# Patient Record
Sex: FEMALE | Race: WHITE | NOT HISPANIC OR LATINO | Employment: FULL TIME | URBAN - METROPOLITAN AREA
[De-identification: names, ages, dates, MRNs, and addresses within clinical notes are randomized per-mention and may not be internally consistent; named-entity substitution may affect disease eponyms.]

---

## 2018-11-13 ENCOUNTER — CLINICAL SUPPORT (OUTPATIENT)
Dept: ONCOLOGY | Facility: HOSPITAL | Age: 63
End: 2018-11-13

## 2018-11-13 ENCOUNTER — HOSPITAL ENCOUNTER (OUTPATIENT)
Dept: ONCOLOGY | Facility: CLINIC | Age: 63
Setting detail: INFUSION SERIES
Discharge: HOME | End: 2018-11-13
Attending: INTERNAL MEDICINE | Admitting: INTERNAL MEDICINE

## 2018-11-13 ENCOUNTER — HOSPITAL ENCOUNTER (OUTPATIENT)
Dept: ONCOLOGY | Facility: HOSPITAL | Age: 63
Discharge: HOME | End: 2018-11-13
Attending: INTERNAL MEDICINE | Admitting: INTERNAL MEDICINE

## 2018-11-13 LAB
ALBUMIN SERPL-MCNC: 3.6 G/DL (ref 3.5–4.8)
ALBUMIN/GLOB SERPL: 0.9 {RATIO} (ref 1–1.7)
ALP SERPL-CCNC: 98 IU/L (ref 32–91)
ALT SERPL-CCNC: 20 IU/L (ref 14–54)
ANION GAP SERPL CALC-SCNC: 13.3 MMOL/L (ref 10–20)
AST SERPL-CCNC: 24 IU/L (ref 15–41)
BILIRUB SERPL-MCNC: 0.9 MG/DL (ref 0.3–1.2)
BUN SERPL-MCNC: 4 MG/DL (ref 8–20)
BUN/CREAT SERPL: 5.7 (ref 5.4–26.2)
CALCIUM SERPL-MCNC: 9.1 MG/DL (ref 8.9–10.3)
CHLORIDE SERPL-SCNC: 94 MMOL/L (ref 101–111)
CONV CO2: 27 MMOL/L (ref 22–32)
CONV TOTAL PROTEIN: 7.7 G/DL (ref 6.1–7.9)
CREAT UR-MCNC: 0.7 MG/DL (ref 0.4–1)
ERYTHROCYTE [SEDIMENTATION RATE] IN BLOOD BY WESTERGREN METHOD: 61 MM/HR (ref 0–30)
GLOBULIN UR ELPH-MCNC: 4.1 G/DL (ref 2.5–3.8)
GLUCOSE SERPL-MCNC: 91 MG/DL (ref 65–99)
LDH SERPL-CCNC: 115 IU/L (ref 98–192)
POTASSIUM SERPL-SCNC: 3.3 MMOL/L (ref 3.6–5.1)
SODIUM SERPL-SCNC: 131 MMOL/L (ref 136–144)
URATE SERPL-MCNC: 5 MG/DL (ref 2.6–8)

## 2018-11-13 NOTE — PROGRESS NOTES
PATIENTS ONCOLOGY RECORD LOCATED IN [facility]      Subjective     Name:  SHERLEY WILKINS     Date:  2018  Address:  [facility] [address] [place] [address]  Home: [unfilled]  :  1955 AGE:  63 y.o.        RECORDS OBTAINED:  Patients Oncology Record is located in [facility]

## 2018-11-16 ENCOUNTER — HOSPITAL ENCOUNTER (OUTPATIENT)
Dept: ONCOLOGY | Facility: CLINIC | Age: 63
Setting detail: INFUSION SERIES
Discharge: HOME | End: 2018-11-16
Attending: INTERNAL MEDICINE | Admitting: INTERNAL MEDICINE

## 2018-11-16 ENCOUNTER — HOSPITAL ENCOUNTER (OUTPATIENT)
Dept: CARDIOLOGY | Facility: HOSPITAL | Age: 63
Discharge: HOME | End: 2018-11-16
Attending: INTERNAL MEDICINE | Admitting: INTERNAL MEDICINE

## 2018-11-16 ENCOUNTER — CLINICAL SUPPORT (OUTPATIENT)
Dept: ONCOLOGY | Facility: HOSPITAL | Age: 63
End: 2018-11-16

## 2018-11-16 NOTE — PROGRESS NOTES
PATIENTS ONCOLOGY RECORD LOCATED IN [facility]      Subjective     Name:  SHERLEY WILKINS     Date:  2018  Address:  [facility] [address] [facility] [facility] [place] [address]  Home: [unfilled]  :  1955 AGE:  63 y.o.        RECORDS OBTAINED:  Patients Oncology Record is located in [facility]

## 2018-11-19 ENCOUNTER — HOSPITAL ENCOUNTER (OUTPATIENT)
Dept: PREADMISSION TESTING | Facility: HOSPITAL | Age: 63
Discharge: HOME | End: 2018-11-19
Attending: SURGERY | Admitting: SURGERY

## 2018-11-19 LAB
ANION GAP SERPL CALC-SCNC: 10.9 MMOL/L (ref 10–20)
BACTERIA SPEC AEROBE CULT: NORMAL
BASOPHILS # BLD AUTO: 0.1 10*3/UL (ref 0–0.2)
BASOPHILS NFR BLD AUTO: 1 % (ref 0–2)
BUN SERPL-MCNC: 5 MG/DL (ref 8–20)
BUN/CREAT SERPL: 7.1 (ref 5.4–26.2)
CALCIUM SERPL-MCNC: 9.3 MG/DL (ref 8.9–10.3)
CHLORIDE SERPL-SCNC: 97 MMOL/L (ref 101–111)
CONV CO2: 28 MMOL/L (ref 22–32)
CREAT UR-MCNC: 0.7 MG/DL (ref 0.4–1)
DIFFERENTIAL METHOD BLD: (no result)
EOSINOPHIL # BLD AUTO: 0.3 10*3/UL (ref 0–0.3)
EOSINOPHIL # BLD AUTO: 3 % (ref 0–3)
ERYTHROCYTE [DISTWIDTH] IN BLOOD BY AUTOMATED COUNT: 14 % (ref 11.5–14.5)
GLUCOSE SERPL-MCNC: 87 MG/DL (ref 65–99)
HCT VFR BLD AUTO: 42.4 % (ref 35–49)
HGB BLD-MCNC: 14.2 G/DL (ref 12–15)
LYMPHOCYTES # BLD AUTO: 2.7 10*3/UL (ref 0.8–4.8)
LYMPHOCYTES NFR BLD AUTO: 25 % (ref 18–42)
Lab: NORMAL
MCH RBC QN AUTO: 28.4 PG (ref 26–32)
MCHC RBC AUTO-ENTMCNC: 33.4 G/DL (ref 32–36)
MCV RBC AUTO: 85 FL (ref 80–94)
MICRO REPORT STATUS: NORMAL
MONOCYTES # BLD AUTO: 0.7 10*3/UL (ref 0.1–1.3)
MONOCYTES NFR BLD AUTO: 6 % (ref 2–11)
NEUTROPHILS # BLD AUTO: 7.2 10*3/UL (ref 2.3–8.6)
NEUTROPHILS NFR BLD AUTO: 65 % (ref 50–75)
NRBC BLD AUTO-RTO: 0 /100{WBCS}
NRBC/RBC NFR BLD MANUAL: 0 10*3/UL
PLATELET # BLD AUTO: 419 10*3/UL (ref 150–450)
PMV BLD AUTO: 7.9 FL (ref 7.4–10.4)
POTASSIUM SERPL-SCNC: 3.9 MMOL/L (ref 3.6–5.1)
RBC # BLD AUTO: 5 10*6/UL (ref 4–5.4)
SODIUM SERPL-SCNC: 132 MMOL/L (ref 136–144)
SPECIMEN SOURCE: NORMAL
WBC # BLD AUTO: 10.9 10*3/UL (ref 4.5–11.5)

## 2018-11-20 ENCOUNTER — HOSPITAL ENCOUNTER (OUTPATIENT)
Dept: ONCOLOGY | Facility: HOSPITAL | Age: 63
Discharge: HOME | End: 2018-11-20
Attending: INTERNAL MEDICINE | Admitting: INTERNAL MEDICINE

## 2018-11-20 LAB — GLUCOSE BLD-MCNC: 164 MG/DL (ref 70–105)

## 2018-11-30 ENCOUNTER — CLINICAL SUPPORT (OUTPATIENT)
Dept: ONCOLOGY | Facility: HOSPITAL | Age: 63
End: 2018-11-30

## 2018-11-30 ENCOUNTER — HOSPITAL ENCOUNTER (OUTPATIENT)
Dept: ONCOLOGY | Facility: CLINIC | Age: 63
Setting detail: INFUSION SERIES
Discharge: HOME | End: 2018-11-30
Attending: INTERNAL MEDICINE | Admitting: INTERNAL MEDICINE

## 2018-11-30 ENCOUNTER — HOSPITAL ENCOUNTER (OUTPATIENT)
Dept: ONCOLOGY | Facility: HOSPITAL | Age: 63
Discharge: HOME | End: 2018-11-30
Attending: INTERNAL MEDICINE | Admitting: INTERNAL MEDICINE

## 2018-11-30 LAB
ALBUMIN SERPL-MCNC: 2.9 G/DL (ref 3.5–4.8)
ALPHA1 GLOB FLD ELPH-MCNC: 0.3 GM/DL (ref 0.1–0.4)
ALPHA2 GLOB SERPL ELPH-MCNC: 0.9 GM/DL (ref 0.5–1)
B-GLOBULIN SERPL ELPH-MCNC: 1.2 GM/DL (ref 0.7–1.4)
CONV TOTAL PROTEIN: 6.3 G/DL (ref 6.1–7.9)
GAMMA GLOB SERPL ELPH-MCNC: 1 GM/DL (ref 0.6–1.6)
INSULIN SERPL-ACNC: ABNORMAL U[IU]/ML

## 2018-11-30 NOTE — PROGRESS NOTES
PATIENTS ONCOLOGY RECORD LOCATED IN [facility]      Subjective     Name:  SHERLEY WILKINS     Date:  2018  Address:  [facility] [address] [place] [address]  Home: [unfilled]  :  1955 AGE:  63 y.o.        RECORDS OBTAINED:  Patients Oncology Record is located in [facility]

## 2018-12-19 ENCOUNTER — HOSPITAL ENCOUNTER (OUTPATIENT)
Dept: INTERVENTIONAL RADIOLOGY/VASCULAR | Facility: HOSPITAL | Age: 63
Discharge: HOME | End: 2018-12-19
Attending: OTOLARYNGOLOGY | Admitting: OTOLARYNGOLOGY

## 2019-01-03 ENCOUNTER — CLINICAL SUPPORT (OUTPATIENT)
Dept: ONCOLOGY | Facility: HOSPITAL | Age: 64
End: 2019-01-03

## 2019-01-03 ENCOUNTER — HOSPITAL ENCOUNTER (OUTPATIENT)
Dept: ONCOLOGY | Facility: CLINIC | Age: 64
Setting detail: INFUSION SERIES
Discharge: HOME | End: 2019-01-03
Attending: INTERNAL MEDICINE | Admitting: INTERNAL MEDICINE

## 2019-01-03 NOTE — PROGRESS NOTES
PATIENTS ONCOLOGY RECORD LOCATED IN [facility]      Subjective     Name:  SHERLEY WILKINS     Date:  2019  Address:  [facility] [address] [facility] [place] [address]  Home: [unfilled]  :  1955 AGE:  63 y.o.        RECORDS OBTAINED:  Patients Oncology Record is located in [facility]

## 2019-01-10 ENCOUNTER — HOSPITAL ENCOUNTER (OUTPATIENT)
Dept: ONCOLOGY | Facility: HOSPITAL | Age: 64
Discharge: HOME | End: 2019-01-10
Attending: INTERNAL MEDICINE | Admitting: INTERNAL MEDICINE

## 2019-01-10 ENCOUNTER — HOSPITAL ENCOUNTER (OUTPATIENT)
Dept: ONCOLOGY | Facility: CLINIC | Age: 64
Setting detail: INFUSION SERIES
Discharge: HOME | End: 2019-01-10
Attending: INTERNAL MEDICINE | Admitting: INTERNAL MEDICINE

## 2019-01-10 ENCOUNTER — CLINICAL SUPPORT (OUTPATIENT)
Dept: ONCOLOGY | Facility: HOSPITAL | Age: 64
End: 2019-01-10

## 2019-01-10 LAB
ALBUMIN SERPL-MCNC: 3.5 G/DL (ref 3.5–4.8)
ALBUMIN/GLOB SERPL: 0.9 {RATIO} (ref 1–1.7)
ALP SERPL-CCNC: 85 IU/L (ref 32–91)
ALT SERPL-CCNC: 17 IU/L (ref 14–54)
ANION GAP SERPL CALC-SCNC: 14.7 MMOL/L (ref 10–20)
AST SERPL-CCNC: 23 IU/L (ref 15–41)
BILIRUB SERPL-MCNC: 0.3 MG/DL (ref 0.3–1.2)
BUN SERPL-MCNC: 10 MG/DL (ref 8–20)
BUN/CREAT SERPL: 14.3 (ref 5.4–26.2)
CALCIUM SERPL-MCNC: 9.2 MG/DL (ref 8.9–10.3)
CHLORIDE SERPL-SCNC: 103 MMOL/L (ref 101–111)
CONV CO2: 22 MMOL/L (ref 22–32)
CONV TOTAL PROTEIN: 7.2 G/DL (ref 6.1–7.9)
CREAT UR-MCNC: 0.7 MG/DL (ref 0.4–1)
GLOBULIN UR ELPH-MCNC: 3.7 G/DL (ref 2.5–3.8)
GLUCOSE SERPL-MCNC: 100 MG/DL (ref 65–99)
POTASSIUM SERPL-SCNC: 3.7 MMOL/L (ref 3.6–5.1)
SODIUM SERPL-SCNC: 136 MMOL/L (ref 136–144)

## 2019-01-11 NOTE — PROGRESS NOTES
PATIENTS ONCOLOGY RECORD LOCATED IN [facility]      Subjective     Name:  SHERLEY WILKINS     Date:  01/10/2019  Address:  [facility] [address] [facility] [address] [facility] [place] [address]  Home: [unfilled]  :  1955 AGE:  63 y.o.        RECORDS OBTAINED:  Patients Oncology Record is located in [facility]

## 2019-01-11 NOTE — PROGRESS NOTES
PATIENTS ONCOLOGY RECORD LOCATED IN [facility]      Subjective     Name:  SHERLEY WILKINS     Date:  01/10/2019  Address:  [facility] [address] [place] [address]  Home: [unfilled]  :  1955 AGE:  63 y.o.        RECORDS OBTAINED:  Patients Oncology Record is located in [facility]

## 2019-01-22 ENCOUNTER — HOSPITAL ENCOUNTER (OUTPATIENT)
Dept: ONCOLOGY | Facility: HOSPITAL | Age: 64
Discharge: HOME | End: 2019-01-22
Attending: INTERNAL MEDICINE | Admitting: INTERNAL MEDICINE

## 2019-01-22 ENCOUNTER — CLINICAL SUPPORT (OUTPATIENT)
Dept: ONCOLOGY | Facility: HOSPITAL | Age: 64
End: 2019-01-22

## 2019-01-22 ENCOUNTER — HOSPITAL ENCOUNTER (OUTPATIENT)
Dept: ONCOLOGY | Facility: CLINIC | Age: 64
Setting detail: INFUSION SERIES
Discharge: HOME | End: 2019-01-22
Attending: INTERNAL MEDICINE | Admitting: INTERNAL MEDICINE

## 2019-01-22 LAB
ALBUMIN SERPL-MCNC: 3.6 G/DL (ref 3.5–4.8)
ALBUMIN/GLOB SERPL: 1 {RATIO} (ref 1–1.7)
ALP SERPL-CCNC: 118 IU/L (ref 32–91)
ALT SERPL-CCNC: 24 IU/L (ref 14–54)
ANION GAP SERPL CALC-SCNC: 11.8 MMOL/L (ref 10–20)
AST SERPL-CCNC: 23 IU/L (ref 15–41)
BILIRUB SERPL-MCNC: <0.1 MG/DL (ref 0.3–1.2)
BUN SERPL-MCNC: 7 MG/DL (ref 8–20)
BUN/CREAT SERPL: 10 (ref 5.4–26.2)
CALCIUM SERPL-MCNC: 9.1 MG/DL (ref 8.9–10.3)
CHLORIDE SERPL-SCNC: 102 MMOL/L (ref 101–111)
CONV CO2: 26 MMOL/L (ref 22–32)
CONV TOTAL PROTEIN: 7.1 G/DL (ref 6.1–7.9)
CREAT UR-MCNC: 0.7 MG/DL (ref 0.4–1)
ERYTHROCYTE [SEDIMENTATION RATE] IN BLOOD BY WESTERGREN METHOD: 49 MM/HR (ref 0–30)
GLOBULIN UR ELPH-MCNC: 3.5 G/DL (ref 2.5–3.8)
GLUCOSE SERPL-MCNC: 103 MG/DL (ref 65–99)
POTASSIUM SERPL-SCNC: 3.8 MMOL/L (ref 3.6–5.1)
SODIUM SERPL-SCNC: 136 MMOL/L (ref 136–144)

## 2019-01-22 NOTE — PROGRESS NOTES
PATIENTS ONCOLOGY RECORD LOCATED IN [facility]      Subjective     Name:  SHERLEY WILKINS     Date:  2019  Address:  [facility] [address] [place] [address]  Home: [unfilled]  :  1955 AGE:  63 y.o.        RECORDS OBTAINED:  Patients Oncology Record is located in [facility]

## 2019-01-31 ENCOUNTER — HOSPITAL ENCOUNTER (OUTPATIENT)
Dept: ONCOLOGY | Facility: CLINIC | Age: 64
Setting detail: INFUSION SERIES
Discharge: HOME | End: 2019-01-31
Attending: INTERNAL MEDICINE | Admitting: INTERNAL MEDICINE

## 2019-01-31 ENCOUNTER — CLINICAL SUPPORT (OUTPATIENT)
Dept: ONCOLOGY | Facility: HOSPITAL | Age: 64
End: 2019-01-31

## 2019-01-31 ENCOUNTER — HOSPITAL ENCOUNTER (OUTPATIENT)
Dept: ONCOLOGY | Facility: HOSPITAL | Age: 64
Discharge: HOME | End: 2019-01-31
Attending: INTERNAL MEDICINE | Admitting: INTERNAL MEDICINE

## 2019-01-31 LAB
ALBUMIN SERPL-MCNC: 3.2 G/DL (ref 3.5–4.8)
ALBUMIN/GLOB SERPL: 0.8 {RATIO} (ref 1–1.7)
ALP SERPL-CCNC: 94 IU/L (ref 32–91)
ALT SERPL-CCNC: 18 IU/L (ref 14–54)
ANION GAP SERPL CALC-SCNC: 13.7 MMOL/L (ref 10–20)
AST SERPL-CCNC: 17 IU/L (ref 15–41)
BILIRUB SERPL-MCNC: 0.3 MG/DL (ref 0.3–1.2)
BUN SERPL-MCNC: 9 MG/DL (ref 8–20)
BUN/CREAT SERPL: 15 (ref 5.4–26.2)
CALCIUM SERPL-MCNC: 9.1 MG/DL (ref 8.9–10.3)
CHLORIDE SERPL-SCNC: 100 MMOL/L (ref 101–111)
CONV CO2: 23 MMOL/L (ref 22–32)
CONV TOTAL PROTEIN: 7 G/DL (ref 6.1–7.9)
CREAT UR-MCNC: 0.6 MG/DL (ref 0.4–1)
ERYTHROCYTE [SEDIMENTATION RATE] IN BLOOD BY WESTERGREN METHOD: 81 MM/HR (ref 0–30)
GLOBULIN UR ELPH-MCNC: 3.8 G/DL (ref 2.5–3.8)
GLUCOSE SERPL-MCNC: 101 MG/DL (ref 65–99)
POTASSIUM SERPL-SCNC: 3.7 MMOL/L (ref 3.6–5.1)
SODIUM SERPL-SCNC: 133 MMOL/L (ref 136–144)
URATE SERPL-MCNC: 2.4 MG/DL (ref 2.6–8)

## 2019-01-31 NOTE — PROGRESS NOTES
PATIENTS ONCOLOGY RECORD LOCATED IN [facility]      Subjective     Name:  SHERLEY WILKINS     Date:  2019  Address:  [facility] [address] [facility] [address] [facility] [place] [address]  Home: [unfilled]  :  1955 AGE:  63 y.o.        RECORDS OBTAINED:  Patients Oncology Record is located in [facility]

## 2019-02-12 ENCOUNTER — CLINICAL SUPPORT (OUTPATIENT)
Dept: ONCOLOGY | Facility: HOSPITAL | Age: 64
End: 2019-02-12

## 2019-02-12 ENCOUNTER — HOSPITAL ENCOUNTER (OUTPATIENT)
Dept: ONCOLOGY | Facility: CLINIC | Age: 64
Setting detail: INFUSION SERIES
Discharge: HOME | End: 2019-02-12
Attending: NURSE PRACTITIONER | Admitting: NURSE PRACTITIONER

## 2019-02-12 NOTE — PROGRESS NOTES
PATIENTS ONCOLOGY RECORD LOCATED IN [facility]      Subjective     Name:  SHERLEY WILKINS     Date:  2019  Address:  [facility] [address] [place] [address]  Home: [unfilled]  :  1955 AGE:  63 y.o.        RECORDS OBTAINED:  Patients Oncology Record is located in [facility]

## 2019-02-21 ENCOUNTER — HOSPITAL ENCOUNTER (OUTPATIENT)
Dept: ONCOLOGY | Facility: HOSPITAL | Age: 64
Discharge: HOME | End: 2019-02-21
Attending: NURSE PRACTITIONER | Admitting: NURSE PRACTITIONER

## 2019-02-21 ENCOUNTER — CLINICAL SUPPORT (OUTPATIENT)
Dept: ONCOLOGY | Facility: HOSPITAL | Age: 64
End: 2019-02-21

## 2019-02-21 ENCOUNTER — HOSPITAL ENCOUNTER (OUTPATIENT)
Dept: ONCOLOGY | Facility: CLINIC | Age: 64
Setting detail: INFUSION SERIES
Discharge: HOME | End: 2019-02-21
Attending: INTERNAL MEDICINE | Admitting: INTERNAL MEDICINE

## 2019-02-21 LAB
ALBUMIN SERPL-MCNC: 3.3 G/DL (ref 3.5–4.8)
ALBUMIN/GLOB SERPL: 0.9 {RATIO} (ref 1–1.7)
ALP SERPL-CCNC: 102 IU/L (ref 32–91)
ALT SERPL-CCNC: 19 IU/L (ref 14–54)
ANION GAP SERPL CALC-SCNC: 15.1 MMOL/L (ref 10–20)
AST SERPL-CCNC: 20 IU/L (ref 15–41)
BILIRUB SERPL-MCNC: 0.3 MG/DL (ref 0.3–1.2)
BUN SERPL-MCNC: 8 MG/DL (ref 8–20)
BUN/CREAT SERPL: 13.3 (ref 5.4–26.2)
CALCIUM SERPL-MCNC: 9.3 MG/DL (ref 8.9–10.3)
CHLORIDE SERPL-SCNC: 102 MMOL/L (ref 101–111)
CONV CO2: 24 MMOL/L (ref 22–32)
CONV TOTAL PROTEIN: 6.8 G/DL (ref 6.1–7.9)
CREAT UR-MCNC: 0.6 MG/DL (ref 0.4–1)
GLOBULIN UR ELPH-MCNC: 3.5 G/DL (ref 2.5–3.8)
GLUCOSE SERPL-MCNC: 77 MG/DL (ref 65–99)
IRON SATN MFR SERPL: 6 % (ref 15–50)
IRON SERPL-MCNC: 18 UG/DL (ref 28–170)
POTASSIUM SERPL-SCNC: 4.1 MMOL/L (ref 3.6–5.1)
SODIUM SERPL-SCNC: 137 MMOL/L (ref 136–144)
TIBC SERPL-MCNC: 302 UG/DL (ref 228–428)

## 2019-02-21 NOTE — PROGRESS NOTES
PATIENTS ONCOLOGY RECORD LOCATED IN [facility]      Subjective     Name:  SHERLEY WILKINS     Date:  2019  Address:  [facility] [address] [facility] [place] [address]  Home: [unfilled]  :  1955 AGE:  63 y.o.        RECORDS OBTAINED:  Patients Oncology Record is located in [facility]

## 2019-02-28 ENCOUNTER — HOSPITAL ENCOUNTER (OUTPATIENT)
Dept: ONCOLOGY | Facility: CLINIC | Age: 64
Setting detail: INFUSION SERIES
Discharge: HOME | End: 2019-02-28
Attending: INTERNAL MEDICINE | Admitting: INTERNAL MEDICINE

## 2019-02-28 ENCOUNTER — HOSPITAL ENCOUNTER (OUTPATIENT)
Dept: ONCOLOGY | Facility: HOSPITAL | Age: 64
Discharge: HOME | End: 2019-02-28

## 2019-02-28 ENCOUNTER — HOSPITAL ENCOUNTER (OUTPATIENT)
Dept: OTHER | Facility: HOSPITAL | Age: 64
Discharge: HOME | End: 2019-02-28
Attending: INTERNAL MEDICINE | Admitting: INTERNAL MEDICINE

## 2019-03-06 ENCOUNTER — HOSPITAL ENCOUNTER (OUTPATIENT)
Dept: ONCOLOGY | Facility: HOSPITAL | Age: 64
Discharge: HOME | End: 2019-03-06
Attending: INTERNAL MEDICINE | Admitting: INTERNAL MEDICINE

## 2019-03-06 ENCOUNTER — CLINICAL SUPPORT (OUTPATIENT)
Dept: ONCOLOGY | Facility: HOSPITAL | Age: 64
End: 2019-03-06

## 2019-03-06 ENCOUNTER — HOSPITAL ENCOUNTER (OUTPATIENT)
Dept: ONCOLOGY | Facility: CLINIC | Age: 64
Setting detail: INFUSION SERIES
Discharge: HOME | End: 2019-03-06
Attending: INTERNAL MEDICINE | Admitting: INTERNAL MEDICINE

## 2019-03-06 LAB — ERYTHROCYTE [SEDIMENTATION RATE] IN BLOOD BY WESTERGREN METHOD: 64 MM/HR (ref 0–30)

## 2019-03-06 NOTE — PROGRESS NOTES
PATIENTS ONCOLOGY RECORD LOCATED IN [facility]      Subjective     Name:  SHERLEY WILKINS     Date:  2019  Address:  [address] [place] [address] [place] [address]  Home: [unfilled]  :  1955 AGE:  63 y.o.        RECORDS OBTAINED:  Patients Oncology Record is located in [facility]

## 2019-03-14 ENCOUNTER — HOSPITAL ENCOUNTER (OUTPATIENT)
Dept: ONCOLOGY | Facility: HOSPITAL | Age: 64
Discharge: HOME | End: 2019-03-14
Attending: INTERNAL MEDICINE | Admitting: INTERNAL MEDICINE

## 2019-03-14 ENCOUNTER — CLINICAL SUPPORT (OUTPATIENT)
Dept: ONCOLOGY | Facility: HOSPITAL | Age: 64
End: 2019-03-14

## 2019-03-14 ENCOUNTER — HOSPITAL ENCOUNTER (OUTPATIENT)
Dept: ONCOLOGY | Facility: CLINIC | Age: 64
Setting detail: INFUSION SERIES
Discharge: HOME | End: 2019-03-14
Attending: INTERNAL MEDICINE | Admitting: INTERNAL MEDICINE

## 2019-03-14 LAB
ALBUMIN SERPL-MCNC: 3.4 G/DL (ref 3.5–4.8)
ALBUMIN/GLOB SERPL: 0.9 {RATIO} (ref 1–1.7)
ALP SERPL-CCNC: 98 IU/L (ref 32–91)
ALT SERPL-CCNC: 20 IU/L (ref 14–54)
ANION GAP SERPL CALC-SCNC: 13.4 MMOL/L (ref 10–20)
AST SERPL-CCNC: 18 IU/L (ref 15–41)
BILIRUB SERPL-MCNC: 0.3 MG/DL (ref 0.3–1.2)
BUN SERPL-MCNC: 8 MG/DL (ref 8–20)
BUN/CREAT SERPL: 13.3 (ref 5.4–26.2)
CALCIUM SERPL-MCNC: 9.5 MG/DL (ref 8.9–10.3)
CHLORIDE SERPL-SCNC: 103 MMOL/L (ref 101–111)
CONV CO2: 24 MMOL/L (ref 22–32)
CONV TOTAL PROTEIN: 7.2 G/DL (ref 6.1–7.9)
CREAT UR-MCNC: 0.6 MG/DL (ref 0.4–1)
GLOBULIN UR ELPH-MCNC: 3.8 G/DL (ref 2.5–3.8)
GLUCOSE SERPL-MCNC: 91 MG/DL (ref 65–99)
POTASSIUM SERPL-SCNC: 4.4 MMOL/L (ref 3.6–5.1)
SODIUM SERPL-SCNC: 136 MMOL/L (ref 136–144)

## 2019-03-14 NOTE — PROGRESS NOTES
PATIENTS ONCOLOGY RECORD LOCATED IN [facility]      Subjective     Name:  SHERLEY WILKINS     Date:  2019  Address:  [facility] [address] [facility] [place] [address]  Home: [unfilled]  :  1955 AGE:  63 y.o.        RECORDS OBTAINED:  Patients Oncology Record is located in [facility]

## 2019-03-26 ENCOUNTER — HOSPITAL ENCOUNTER (OUTPATIENT)
Dept: ONCOLOGY | Facility: CLINIC | Age: 64
Setting detail: INFUSION SERIES
Discharge: HOME | End: 2019-03-26
Attending: NURSE PRACTITIONER | Admitting: NURSE PRACTITIONER

## 2019-03-26 ENCOUNTER — CLINICAL SUPPORT (OUTPATIENT)
Dept: ONCOLOGY | Facility: HOSPITAL | Age: 64
End: 2019-03-26

## 2019-03-26 NOTE — PROGRESS NOTES
PATIENTS ONCOLOGY RECORD LOCATED IN [facility]      Subjective     Name:  SHERLEY WILKINS     Date:  2019  Address:  [address] [place] [address] [place] [address]  Home: [unfilled]  :  1955 AGE:  63 y.o.        RECORDS OBTAINED:  Patients Oncology Record is located in [facility]

## 2019-04-04 ENCOUNTER — HOSPITAL ENCOUNTER (OUTPATIENT)
Dept: ONCOLOGY | Facility: HOSPITAL | Age: 64
Discharge: HOME | End: 2019-04-04
Attending: INTERNAL MEDICINE | Admitting: INTERNAL MEDICINE

## 2019-04-04 ENCOUNTER — CLINICAL SUPPORT (OUTPATIENT)
Dept: ONCOLOGY | Facility: HOSPITAL | Age: 64
End: 2019-04-04

## 2019-04-04 ENCOUNTER — HOSPITAL ENCOUNTER (OUTPATIENT)
Dept: ONCOLOGY | Facility: CLINIC | Age: 64
Setting detail: INFUSION SERIES
Discharge: HOME | End: 2019-04-04
Attending: INTERNAL MEDICINE | Admitting: INTERNAL MEDICINE

## 2019-04-04 LAB
ALBUMIN SERPL-MCNC: 3.5 G/DL (ref 3.5–4.8)
ALBUMIN/GLOB SERPL: 1.2 {RATIO} (ref 1–1.7)
ALP SERPL-CCNC: 89 IU/L (ref 32–91)
ALT SERPL-CCNC: 22 IU/L (ref 14–54)
ANION GAP SERPL CALC-SCNC: 18.8 MMOL/L (ref 10–20)
AST SERPL-CCNC: 24 IU/L (ref 15–41)
BILIRUB SERPL-MCNC: 0.2 MG/DL (ref 0.3–1.2)
BUN SERPL-MCNC: 11 MG/DL (ref 8–20)
BUN/CREAT SERPL: 15.7 (ref 5.4–26.2)
CALCIUM SERPL-MCNC: 9.4 MG/DL (ref 8.9–10.3)
CHLORIDE SERPL-SCNC: 96 MMOL/L (ref 101–111)
CONV CO2: 24 MMOL/L (ref 22–32)
CONV TOTAL PROTEIN: 6.4 G/DL (ref 6.1–7.9)
CREAT UR-MCNC: 0.7 MG/DL (ref 0.4–1)
GLOBULIN UR ELPH-MCNC: 2.9 G/DL (ref 2.5–3.8)
GLUCOSE SERPL-MCNC: 93 MG/DL (ref 65–99)
POTASSIUM SERPL-SCNC: 3.8 MMOL/L (ref 3.6–5.1)
SODIUM SERPL-SCNC: 135 MMOL/L (ref 136–144)

## 2019-04-04 NOTE — PROGRESS NOTES
PATIENTS ONCOLOGY RECORD LOCATED IN [facility]      Subjective     Name:  SHERLEY WILKINS     Date:  2019  Address:  [facility] [address] [facility] [place] [address]  Home: [unfilled]  :  1955 AGE:  64 y.o.        RECORDS OBTAINED:  Patients Oncology Record is located in [facility]

## 2019-04-16 ENCOUNTER — CLINICAL SUPPORT (OUTPATIENT)
Dept: ONCOLOGY | Facility: HOSPITAL | Age: 64
End: 2019-04-16

## 2019-04-16 ENCOUNTER — HOSPITAL ENCOUNTER (OUTPATIENT)
Dept: ONCOLOGY | Facility: CLINIC | Age: 64
Setting detail: INFUSION SERIES
Discharge: HOME | End: 2019-04-16
Attending: INTERNAL MEDICINE | Admitting: INTERNAL MEDICINE

## 2019-04-16 ENCOUNTER — HOSPITAL ENCOUNTER (OUTPATIENT)
Dept: ONCOLOGY | Facility: HOSPITAL | Age: 64
Discharge: HOME | End: 2019-04-16
Attending: INTERNAL MEDICINE | Admitting: INTERNAL MEDICINE

## 2019-04-16 NOTE — PROGRESS NOTES
PATIENTS ONCOLOGY RECORD LOCATED IN [facility]      Subjective     Name:  SHERLEY WILKINS     Date:  2019  Address:  [address] [place] [address] [place] [facility]  Home: [unfilled]  :  1955 AGE:  64 y.o.        RECORDS OBTAINED:  Patients Oncology Record is located in [facility]

## 2019-04-25 ENCOUNTER — CLINICAL SUPPORT (OUTPATIENT)
Dept: ONCOLOGY | Facility: HOSPITAL | Age: 64
End: 2019-04-25

## 2019-04-25 ENCOUNTER — HOSPITAL ENCOUNTER (OUTPATIENT)
Dept: ONCOLOGY | Facility: CLINIC | Age: 64
Setting detail: INFUSION SERIES
Discharge: HOME | End: 2019-04-25
Attending: INTERNAL MEDICINE | Admitting: INTERNAL MEDICINE

## 2019-04-25 ENCOUNTER — HOSPITAL ENCOUNTER (OUTPATIENT)
Dept: ONCOLOGY | Facility: HOSPITAL | Age: 64
Discharge: HOME | End: 2019-04-25
Attending: INTERNAL MEDICINE | Admitting: INTERNAL MEDICINE

## 2019-04-25 LAB
ALBUMIN SERPL-MCNC: 3.1 G/DL (ref 3.5–4.8)
ALBUMIN/GLOB SERPL: 0.8 {RATIO} (ref 1–1.7)
ALP SERPL-CCNC: 81 IU/L (ref 32–91)
ALT SERPL-CCNC: 19 IU/L (ref 14–54)
ANION GAP SERPL CALC-SCNC: 14.8 MMOL/L (ref 10–20)
AST SERPL-CCNC: 18 IU/L (ref 15–41)
BILIRUB SERPL-MCNC: 0.1 MG/DL (ref 0.3–1.2)
BUN SERPL-MCNC: 9 MG/DL (ref 8–20)
BUN/CREAT SERPL: 15 (ref 5.4–26.2)
CALCIUM SERPL-MCNC: 9 MG/DL (ref 8.9–10.3)
CHLORIDE SERPL-SCNC: 100 MMOL/L (ref 101–111)
CONV CO2: 22 MMOL/L (ref 22–32)
CONV TOTAL PROTEIN: 6.8 G/DL (ref 6.1–7.9)
CREAT UR-MCNC: 0.6 MG/DL (ref 0.4–1)
GLOBULIN UR ELPH-MCNC: 3.7 G/DL (ref 2.5–3.8)
GLUCOSE SERPL-MCNC: 102 MG/DL (ref 65–99)
POTASSIUM SERPL-SCNC: 3.8 MMOL/L (ref 3.6–5.1)
SODIUM SERPL-SCNC: 133 MMOL/L (ref 136–144)

## 2019-04-25 NOTE — PROGRESS NOTES
PATIENTS ONCOLOGY RECORD LOCATED IN [facility]      Subjective     Name:  SHERLEY WILKINS     Date:  2019  Address:  [address] [place] [address] [place] [facility]  Home: [unfilled]  :  1955 AGE:  64 y.o.        RECORDS OBTAINED:  Patients Oncology Record is located in [facility]

## 2019-05-08 ENCOUNTER — HOSPITAL ENCOUNTER (OUTPATIENT)
Dept: OTHER | Facility: HOSPITAL | Age: 64
Discharge: HOME | End: 2019-05-08
Attending: INTERNAL MEDICINE | Admitting: INTERNAL MEDICINE

## 2019-05-14 ENCOUNTER — CLINICAL SUPPORT (OUTPATIENT)
Dept: ONCOLOGY | Facility: HOSPITAL | Age: 64
End: 2019-05-14

## 2019-05-14 ENCOUNTER — HOSPITAL ENCOUNTER (OUTPATIENT)
Dept: ONCOLOGY | Facility: HOSPITAL | Age: 64
Discharge: HOME | End: 2019-05-14
Attending: INTERNAL MEDICINE | Admitting: INTERNAL MEDICINE

## 2019-05-14 ENCOUNTER — HOSPITAL ENCOUNTER (OUTPATIENT)
Dept: ONCOLOGY | Facility: CLINIC | Age: 64
Setting detail: INFUSION SERIES
Discharge: HOME | End: 2019-05-14
Attending: NURSE PRACTITIONER | Admitting: NURSE PRACTITIONER

## 2019-05-14 LAB
FERRITIN SERPL-MCNC: 160 NG/ML (ref 11–307)
IRON SATN MFR SERPL: 6 % (ref 15–50)
IRON SERPL-MCNC: 17 UG/DL (ref 28–170)
TIBC SERPL-MCNC: 283 UG/DL (ref 228–428)

## 2019-05-14 NOTE — PROGRESS NOTES
PATIENTS ONCOLOGY RECORD LOCATED IN [facility]      Subjective     Name:  SHERLEY WILKINS     Date:  2019  Address:  [address] [place] [address] [place] [facility]  Home: [unfilled]  :  1955 AGE:  64 y.o.        RECORDS OBTAINED:  Patients Oncology Record is located in [facility]

## 2019-06-13 DIAGNOSIS — C83.31 DIFFUSE LARGE B-CELL LYMPHOMA OF LYMPH NODES OF NECK (HCC): Primary | ICD-10-CM

## 2019-06-13 RX ORDER — SODIUM CHLORIDE 0.9 % (FLUSH) 0.9 %
10 SYRINGE (ML) INJECTION AS NEEDED
Status: CANCELLED | OUTPATIENT
Start: 2019-06-17

## 2019-06-18 ENCOUNTER — TELEPHONE (OUTPATIENT)
Dept: ONCOLOGY | Facility: CLINIC | Age: 64
End: 2019-06-18

## 2019-06-18 RX ORDER — ROSUVASTATIN CALCIUM 5 MG/1
5 TABLET, COATED ORAL DAILY
COMMUNITY
End: 2020-05-21

## 2019-06-18 RX ORDER — ONDANSETRON HYDROCHLORIDE 8 MG/1
TABLET, FILM COATED ORAL EVERY 8 HOURS PRN
COMMUNITY
End: 2020-05-21

## 2019-06-18 RX ORDER — LISINOPRIL 10 MG/1
10 TABLET ORAL DAILY
COMMUNITY
End: 2020-12-03

## 2019-06-18 RX ORDER — MULTIVIT-MIN/IRON/FOLIC ACID/K 18-600-40
CAPSULE ORAL DAILY
COMMUNITY

## 2019-06-18 RX ORDER — HYDROCHLOROTHIAZIDE 25 MG/1
25 TABLET ORAL DAILY
COMMUNITY
End: 2020-12-03 | Stop reason: ALTCHOICE

## 2019-06-18 RX ORDER — WARFARIN SODIUM 6 MG/1
6 TABLET ORAL DAILY
COMMUNITY
End: 2019-07-01 | Stop reason: SDUPTHER

## 2019-06-18 RX ORDER — METOPROLOL TARTRATE 50 MG/1
50 TABLET, FILM COATED ORAL DAILY
COMMUNITY
End: 2020-12-03

## 2019-06-18 RX ORDER — PROMETHAZINE HYDROCHLORIDE 25 MG/1
25 TABLET ORAL EVERY 6 HOURS PRN
COMMUNITY
End: 2020-05-21

## 2019-06-18 RX ORDER — LORATADINE 10 MG/1
CAPSULE, LIQUID FILLED ORAL DAILY
COMMUNITY
End: 2023-02-02

## 2019-06-18 RX ORDER — OMEPRAZOLE 40 MG/1
40 CAPSULE, DELAYED RELEASE ORAL DAILY
COMMUNITY
End: 2020-05-21

## 2019-06-19 ENCOUNTER — APPOINTMENT (OUTPATIENT)
Dept: ONCOLOGY | Facility: CLINIC | Age: 64
End: 2019-06-19

## 2019-06-19 DIAGNOSIS — Z51.81 ENCOUNTER FOR MONITORING COUMADIN THERAPY: Primary | ICD-10-CM

## 2019-06-19 DIAGNOSIS — Z79.01 ENCOUNTER FOR MONITORING COUMADIN THERAPY: Primary | ICD-10-CM

## 2019-06-20 ENCOUNTER — TELEPHONE (OUTPATIENT)
Dept: ONCOLOGY | Facility: CLINIC | Age: 64
End: 2019-06-20

## 2019-06-25 ENCOUNTER — TELEPHONE (OUTPATIENT)
Dept: ONCOLOGY | Facility: CLINIC | Age: 64
End: 2019-06-25

## 2019-06-25 DIAGNOSIS — I74.10 AORTIC MURAL THROMBUS (HCC): Primary | ICD-10-CM

## 2019-06-25 LAB — INR PPP: 2.62 (ref 2–3)

## 2019-06-25 NOTE — TELEPHONE ENCOUNTER
----- Message from CORAL Guerra sent at 6/25/2019  3:00 PM EDT -----  Katya, please call patient and ask her to continue her current dose of coumadin, 7 mg and recheck her INR in 2 weeks. Electronically signed by CORAL Guerra, 06/25/19, 3:00 PM.

## 2019-06-25 NOTE — TELEPHONE ENCOUNTER
Called pt and let her know to continue Coumadin 7mg po daily and recheck INR in 2 weeks.  Pt showed v/u.

## 2019-06-28 ENCOUNTER — HOSPITAL ENCOUNTER (OUTPATIENT)
Dept: ONCOLOGY | Facility: HOSPITAL | Age: 64
Setting detail: INFUSION SERIES
Discharge: HOME | End: 2019-06-28

## 2019-06-28 VITALS
RESPIRATION RATE: 18 BRPM | DIASTOLIC BLOOD PRESSURE: 89 MMHG | BODY MASS INDEX: 24.49 KG/M2 | HEIGHT: 65 IN | WEIGHT: 147 LBS | SYSTOLIC BLOOD PRESSURE: 145 MMHG | TEMPERATURE: 98 F | HEART RATE: 78 BPM

## 2019-06-28 DIAGNOSIS — C83.31 DIFFUSE LARGE B-CELL LYMPHOMA OF LYMPH NODES OF NECK (HCC): Primary | ICD-10-CM

## 2019-06-28 PROCEDURE — 96523 IRRIG DRUG DELIVERY DEVICE: CPT

## 2019-06-28 RX ORDER — SODIUM CHLORIDE 0.9 % (FLUSH) 0.9 %
10 SYRINGE (ML) INJECTION AS NEEDED
Status: CANCELLED | OUTPATIENT
Start: 2019-06-28

## 2019-06-28 RX ORDER — SODIUM CHLORIDE 0.9 % (FLUSH) 0.9 %
10 SYRINGE (ML) INJECTION AS NEEDED
Status: DISCONTINUED | OUTPATIENT
Start: 2019-06-28 | End: 2019-06-29 | Stop reason: HOSPADM

## 2019-06-28 RX ADMIN — Medication 30 ML: at 15:09

## 2019-06-28 RX ADMIN — HEPARIN 500 UNITS: 100 SYRINGE at 15:10

## 2019-07-01 RX ORDER — WARFARIN SODIUM 6 MG/1
6 TABLET ORAL DAILY
Qty: 30 TABLET | Refills: 3 | Status: SHIPPED | OUTPATIENT
Start: 2019-07-01 | End: 2020-05-21

## 2019-07-01 RX ORDER — WARFARIN SODIUM 6 MG/1
TABLET ORAL
Qty: 30 TABLET | Refills: 1 | OUTPATIENT
Start: 2019-07-01

## 2019-07-09 ENCOUNTER — TELEPHONE (OUTPATIENT)
Dept: ONCOLOGY | Facility: CLINIC | Age: 64
End: 2019-07-09

## 2019-07-09 NOTE — TELEPHONE ENCOUNTER
----- Message from CORAL Guerra sent at 7/9/2019 12:53 PM EDT -----  Please call patient and ask her to continue her current dose of coumadin and recheck her INR in 1 month.  Her INR was 2.45.  Thanks.

## 2019-07-16 ENCOUNTER — TELEPHONE (OUTPATIENT)
Dept: ONCOLOGY | Facility: HOSPITAL | Age: 64
End: 2019-07-16

## 2019-08-06 ENCOUNTER — TELEPHONE (OUTPATIENT)
Dept: ONCOLOGY | Facility: CLINIC | Age: 64
End: 2019-08-06

## 2019-08-06 ENCOUNTER — TELEPHONE (OUTPATIENT)
Dept: ONCOLOGY | Facility: HOSPITAL | Age: 64
End: 2019-08-06

## 2019-08-06 NOTE — TELEPHONE ENCOUNTER
Received call from Erika Vaz NP w/ Dr Mk Lezama, she is wanting to let us know that she placed the pt on Keflex and Mucinex DM due to pt being diagnosed w/ URI and bronchitis.  She wanted to let us know bc pt is on Warfarin.  Pt's INR was checked at their office today and it was 2.3.  Pt has follow up w/ Dr Larkin on Thursday 8/8/19.  If you have any further questions or concerns Erika's number is [phone]

## 2019-08-06 NOTE — TELEPHONE ENCOUNTER
spoke with patient about inr results she advised she is checking monthly and today she went to her PCP due to bronchitis they gave her an antibiotic I explained to the patient that this will make her INR go up or down and she will need to recheck her inr on monday she will be starting her antibiotic today 8/6/19 she has an appointment with Dr. Larkin on 8/8/19

## 2019-08-07 PROBLEM — T45.1X5A ANTINEOPLASTIC CHEMOTHERAPY INDUCED PANCYTOPENIA (HCC): Status: ACTIVE | Noted: 2019-08-07

## 2019-08-07 PROBLEM — D50.9 IDA (IRON DEFICIENCY ANEMIA): Status: ACTIVE | Noted: 2019-08-07

## 2019-08-07 PROBLEM — IMO0001 SMOKING: Status: ACTIVE | Noted: 2019-08-07

## 2019-08-07 PROBLEM — Z45.2 ENCOUNTER FOR CARE RELATED TO VASCULAR ACCESS PORT: Status: ACTIVE | Noted: 2019-08-07

## 2019-08-07 PROBLEM — D61.810 ANTINEOPLASTIC CHEMOTHERAPY INDUCED PANCYTOPENIA: Status: ACTIVE | Noted: 2019-08-07

## 2019-08-07 PROBLEM — F17.200 SMOKING: Status: ACTIVE | Noted: 2019-08-07

## 2019-08-07 NOTE — PROGRESS NOTES
Hematology/Oncology Outpatient Follow Up    PATIENT NAME:Sarah Borja  :1955  MRN: [id number]  PRIMARY CARE PHYSICIAN: Miguel Lezama MD  REFERRING PHYSICIAN: Sly Rosario MD    Chief Complaint   Patient presents with   • Follow-up     Right tonsillar diffuse cell B Celll Lymphoma        HISTORY OF PRESENT ILLNESS:   1. Right tonsillar diffuse large B-cell lymphoma diagnosed 2018.  • This  female who claims to have developed a sore throat in 2018. She saw her primary care provider, Tran Vaz N.P. and was prescribed antibiotic. She did have palpable lymph nodes and a CT scan of the soft tissue neck was performed on 10/11/18 revealing asymmetric enlargement of the right palatine tonsil. There was right jugular digastric and posterior triangle adenopathy. The largest lymph node within the posterior triangle measured approximately 3.6 x 3.2 x 2.0 cm. She was referred to Sly Rosario M.D. and was seen by him in initial consultation on 18 where laryngoscopy revealed right tonsillar mass. FNA was performed on in on 10/31/18 with specimen sent for pathology, but routine staining was not performed. Flow cytometry revealed a CD10 positive monoclonal B-cell population which by light scatter analysis fell into both the small cell and large cell regions, though predominantly into the large cell region. FISH analysis for BCL2, BCL6 and MYC were negative. Though Dr. Rosario had requested routine staining of the specimen, this was not performed and Pathology had wanted a more fresh specimen, prior to which she was referred to me for consultation. The patient today is denying any weight loss. She does complain of low-grade fevers controlled with Aspirin and claims to have had night sweats on and off for 10 years.   • 18 - [facility] cell non-Hodgkin's lymphoma of the right tonsil on referral by Sly Rosario M.D.  WBC 10.9 with 69% neutrophils, 23% lymphocytes, 6% monocytes, hemoglobin 13.8, platelet count 408,000.  ().  Globulin 4.1 (2.5-3.8), uric acid 5.0 (2.6-8.0), ESR 61 (0-30).      • 11/16/18 - Bone marrow aspiration and biopsy with normocellular marrow with maturing trilineage hematopoiesis. Immunohistochemistry had no increase in CD34 positive blasts or CD20 positive B-lymphocytes. Flow cytometry had no evidence of an abnormal cell type. Cytogenetics revealed 48 XX normal female karyotype. Echocardiogram with mild mitral and mild tricuspid regurgitation, moderate concentric left ventricular hypertrophy and LV systolic function normal with EF about 65-70%.   • 11/17/18 - Echocardiogram with mild mitral and tricuspid regurgitation, moderate concentric left ventricular hypertrophy, LV systolic function normal with EF about 65-70%.   • 11/19/18 - Chest x-ray with clear lungs.   • 11/20/18 - PET scan with extensive right neck lymphadenopathy beginning at the right tonsillar pillar and continuing to the supraclavicular region with SUV between 26 and 16 with extremely hypermetabolic lymph nodes, changes of cholelithiasis and old healed granulomatous disease. Mass of the nodes at L2 are approximately 6.5 x 4.2 cm at the level 3 adenopathy is also seen over an area of 5.5 x 2.8 submandibular gland and sternocleidomastoid muscle are poorly-delineated in the adenopathy. Supraclavicular lymph node is seen measuring over 24 mm. No hypermetabolic activity in the abdomen. Mild infrarenal abdominal aortic aneurysm measuring 28 mm.   • 11/29/18 - Patient underwent Infusaport placement by Ashish Mcginnis M.D.   • 11/30/18 - WBC 8.5, hemoglobin 12.7, platelet count 358,000. Discussed workup results. Patient started on Allopurinol 300 mg p.o. daily with repeat tonsillar biopsy planned for histology. SPEP with hypoalbuminemia. Hepatitis B core antibody surface antigen, hepatis C antibody all non-reactive.   • 12/19/18 -  Patient underwent ultrasound-guided needle biopsy of right neck mass by Ashish Funk M.D. with pathology revealing diffuse large B-cell lymphoma germinal center type. Flow cytometry revealed CD10 positive B-cell lymphoma. The lymphoma was CD20 and surface kappa and lambda chain positive.   • 12/27/18 - Chemotherapy orders written for R-CHOP. Rituximab 375 mg/M2 IV day 1, Cyclophosphamide 750 mg/M2 IV day 1, Doxorubicin 50 mg/M2 IV day 1, Vincristine 1.4 mg/M2 IV day 1 with a max of 2 mg dose and Prednisone 100 mg p.o. days 3-8 to cycle every 21 days for about six cycles in a curative intent. Neulasta Onpro also ordered.    • 1/3/19 - Discussed results of workup and chemotherapy.   • 1/10/19 - Cycle 1 chemotherapy given.   • 1/17/19 - WBC 2.1 with 28% neutrophils, 49% lymphocytes, 8% monocytes, hemoglobin 12.9, platelet count 234,000.   • 1/22/19 - Patient tolerating chemotherapy well. Sed rate 49 (0-30).  ESR 49 (H).   • 1/31/19 - Cycle 2 R-CHOP initiated with Neulasta support. Sed rate 81 (0-30).  ESR 81 (H). Uric acid 2.4 (L). Sodium 133 (L).   • 2/7/19 - Labs at [facility] with verbal report of WBC 1.5 and ANC 0.41. Cipro 500 mg p.o. b.i.d. x7 days as prophylaxis prescribed.   • 2/12/19 - Patient tolerating chemotherapy overall well to date with some expected taste changes and constipation. Patient advised she may use MiraLAX p.r.n. and patient advised to play with diet as well use of sour candies or mints to stimulate the taste buds. A brief period of neutropenia without febrile neutropenia noted during the last cycle with Neulasta given. Allopurinol discontinued.   • 2/21/19 - Ferritin 121 (N), iron 18 (L),  (N), iron saturation 6% (L). Prescribed Ferrous Sulfate 325 mg daily. Received cycle 3 day 1 R-CHOP. Port will not draw. Activase utilized and started on Coumadin 1 mg p.o. daily.   • 2/28/19 - Stool heme negative x3. CT neck and chest with contrast showed previously-identified mass  centered with right palatine tonsils appears to have resolved. Previously-identified right cervical lymphadenopathy has resolved. No evidence of lymphadenopathy within the chest. 4 mm left upper lobe nodule is stable from prior PET CT.  This is indeterminate. Recommend attention to follow up. Echocardiogram showed aortic valve sclerosis, EF 65-70%. No pericardial effusion.   • 3/6/19 - Discussed with the patient the option of either continuing with chemotherapy for a total of six cycles versus stopping chemotherapy and proceeding with radiation to her site of disease. Patient would like to complete planned chemotherapy course.   • 3/6/19 - PT 11.8, INR 1.0.  Patient continued on Coumadin 1 mg p.o. daily for port malfunction.  ESR 64, high.    • 3/14/19 - Cycle 4 chemotherapy given.   • 4/4/19 - Cycle 5 chemotherapy given.   • 4/16/19 - Patient complains of upper respiratory symptoms which are improving on Cipro.   • 4/25/19 - Cycle 6 chemotherapy given. Urinalysis showed trace heme.   • 5/5/19 - . Prescribed Ciprofloxacin 500 mg p.o. b.i.d. x7 days.   • 5/8/19 - Echocardiogram 60-65% ejection fraction with mild mitral and tricuspid regurgitation. CT soft tissue neck with no acute process or adenopathy seen. CT chest, abdomen and pelvis showed no acute cardiopulmonary process, no suspicious lymphadenopathy. Previously described 4 mm nodule in the left upper lobe is likely an intrafissural lymph node. No acute intraabdominal or intrapelvic process. No suspicious lymphadenopathy. Atherosclerosis with infrarenal abdominal aortic ectasia/aneurysm with mural thrombus formation. Coumadin increased to 5 mg p.o. q.PM.  • 8/8/2019 Coumadin dose decreased to 1 mg p.o. daily for [id number] maintenance.  2.   Iron deficiency anemia diagnosed February 2018.   • 1/31/19 - Hemoglobin 11.9, MCV 87.9 and RDW 14.9.   • 2/21/19 - Ferritin 121 (N), iron 18 (L),  (N), iron saturation 6 (L). Prescribed Ferrous Sulfate 325  mg one tablet p.o. daily.   • 2/28/19 - Stool heme negative x3.   • 3/26/19 - Patient reports she is not taking ferrous sulfate daily as prescribed.  She chose to increase her intake of red meat.  Instructed to start taking ferrous sulfate 325 mg p.o. daily.  Explained rationale.   • 5/14/19 - 160 (). Iron 17 (). TIBC 283 (228-428). Iron saturation 6 (15-50).  Ferritin 160.  • 8/8/2019 hemoglobin 13.2, MCV 82.  3.   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   • 5/9/19 - Prescribed Coumadin 5 mg p.o. daily. Referred to Dr. Duckworth for evaluation of infrarenal aortic aneurysm and mural thrombus seen on CT abdomen and pelvis.   • 5/14/19 - INR 1.4. Increased Coumadin to 6 mg p.o. daily. Follow INR protocol. INR’s to be done at [facility]. Prescription given.  • 8/8/2019 patient claims to have been seen by Dr. Duckworth and advised to stop warfarin.    Past Medical History:   Diagnosis Date   • Hypertension 1998       History reviewed. No pertinent surgical history.      Current Outpatient Medications:   •  B Complex Vitamins (VITAMIN B COMPLEX 100 IJ), Inject  as directed., Disp: , Rfl:   •  Calcium-Magnesium-Vitamin D (CALCIUM 500 PO), Take  by mouth Daily., Disp: , Rfl:   •  cephalexin (KEFLEX) 500 MG capsule, , Disp: , Rfl:   •  Cholecalciferol (VITAMIN D) 2000 units capsule, Take  by mouth Daily., Disp: , Rfl:   •  hydrochlorothiazide (HYDRODIURIL) 25 MG tablet, Take 25 mg by mouth Daily., Disp: , Rfl:   •  Loratadine (CLARITIN) 10 MG capsule, Take  by mouth Daily., Disp: , Rfl:   •  metoprolol tartrate (LOPRESSOR) 50 MG tablet, Take 50 mg by mouth Daily., Disp: , Rfl:   •  Potassium 99 MG tablet, Take  by mouth Daily., Disp: , Rfl:   •  warfarin (COUMADIN) 6 MG tablet, Take 1 tablet by mouth Daily. (Patient taking differently: Take 7 mg by mouth Daily.), Disp: 30 tablet, Rfl: 3  •  B Complex Vitamins (VITAMIN B COMPLEX PO), Take  by mouth Daily., Disp: , Rfl:   •   lisinopril (PRINIVIL,ZESTRIL) 10 MG tablet, Take 10 mg by mouth Daily., Disp: , Rfl:   •  omeprazole (priLOSEC) 40 MG capsule, Take 40 mg by mouth Daily., Disp: , Rfl:   •  ondansetron (ZOFRAN) 8 MG tablet, Take  by mouth Every 8 (Eight) Hours As Needed for Nausea or Vomiting., Disp: , Rfl:   •  promethazine (PHENERGAN) 25 MG tablet, Take 25 mg by mouth Every 6 (Six) Hours As Needed for Nausea or Vomiting., Disp: , Rfl:   •  rosuvastatin (CRESTOR) 5 MG tablet, Take 5 mg by mouth Daily., Disp: , Rfl:   No current facility-administered medications for this visit.     Facility-Administered Medications Ordered in Other Visits:   •  heparin flush (porcine) 100 UNIT/ML injection 500 Units, 500 Units, Intravenous, PRN, Sanjay Larkin MD  •  sodium chloride 0.9 % flush 10 mL, 10 mL, Intravenous, PRN, Sanjay Larkin MD, 30 mL at 08/08/19 1433    No Known Allergies    Family History   Problem Relation Age of Onset   • Kidney cancer Father 66   • Leukemia Brother 48   • Multiple myeloma Brother 47       Cancer-related family history includes Kidney cancer (age of onset: 66) in her father.    Social History     Tobacco Use   • Smoking status: Current Every Day Smoker     Types: Cigarettes   • Tobacco comment: started smoking when she was in her 20’s. She is currently smoking 10 or less cigarettes a day   Substance Use Topics   • Alcohol use: No     Frequency: Never   • Drug use: No       I have reviewed the history of present illness, past medical history, family history, social history, lab results, all notes and other records since the patient was last seen on 4/16/19.    SUBJECTIVE: Patient is here for follow up of right tonsillar diffuse large B-cell lymphoma. MEL Morgan present during office visit. Reported to having one refill left on her prescription for Coumadin 1mg.             REVIEW OF SYSTEMS:  Review of Systems   Constitutional: Negative for chills and fever.   HENT: Negative for ear pain, mouth  "sores, nosebleeds and sore throat.    Eyes: Negative for photophobia and visual disturbance.   Respiratory: Negative for wheezing and stridor.    Cardiovascular: Negative for chest pain and palpitations.   Gastrointestinal: Negative for abdominal pain, diarrhea, nausea and vomiting.   Endocrine: Negative for cold intolerance and heat intolerance.   Genitourinary: Negative for dysuria and hematuria.   Musculoskeletal: Negative for joint swelling and neck stiffness.   Skin: Negative for color change and rash.   Neurological: Negative for seizures and syncope.   Hematological: Negative for adenopathy.        No obvious bleeding   Psychiatric/Behavioral: Negative for agitation, confusion and hallucinations.       OBJECTIVE:    Vitals:    08/08/19 1417   BP: 147/95   Pulse: 103   Resp: 20   Temp: 97.4 °F (36.3 °C)   Weight: 64.9 kg (143 lb)   Height: 162.6 cm (64\")   PainSc: 0-No pain       ECOG  (0) Fully active, able to carry on all predisease performance without restriction    Physical Exam   Constitutional: She is oriented to person, place, and time. No distress.   HENT:   Head: Normocephalic and atraumatic.   Eye glasses present. Dental fillings.    Eyes: Conjunctivae and EOM are normal. Right eye exhibits no discharge. Left eye exhibits no discharge. No scleral icterus.   Neck: Normal range of motion. Neck supple. No thyromegaly present.   Cardiovascular: Normal rate, regular rhythm and normal heart sounds. Exam reveals no gallop and no friction rub.   Pulmonary/Chest: Effort normal. No stridor. No respiratory distress. She has no wheezes.   Left chest wall port.   Abdominal: Soft. Bowel sounds are normal. She exhibits no mass. There is no tenderness. There is no rebound and no guarding.   Musculoskeletal: Normal range of motion. She exhibits no tenderness.   Degenerative changes.    Lymphadenopathy:     She has no cervical adenopathy.   Neurological: She is alert and oriented to person, place, and time. She " exhibits normal muscle tone.   Skin: Skin is warm. No rash noted. She is not diaphoretic. No erythema.   Psychiatric: She has a normal mood and affect. Her behavior is normal.   Nursing note and vitals reviewed.      RECENT LABS  WBC   Date Value Ref Range Status   08/08/2019 8.09 3.40 - 10.80 10*3/mm3 Final     RBC   Date Value Ref Range Status   08/08/2019 4.82 3.77 - 5.28 10*6/mm3 Final     Hemoglobin   Date Value Ref Range Status   08/08/2019 13.2 12.0 - 15.9 g/dL Final     Hematocrit   Date Value Ref Range Status   08/08/2019 39.5 34.0 - 46.6 % Final     MCV   Date Value Ref Range Status   08/08/2019 82.0 79.0 - 97.0 fL Final     MCH   Date Value Ref Range Status   08/08/2019 27.4 26.6 - 33.0 pg Final     MCHC   Date Value Ref Range Status   08/08/2019 33.4 31.5 - 35.7 g/dL Final     RDW   Date Value Ref Range Status   08/08/2019 15.9 (H) 12.3 - 15.4 % Final     RDW-SD   Date Value Ref Range Status   08/08/2019 47.3 37.0 - 54.0 fl Final     MPV   Date Value Ref Range Status   08/08/2019 9.4 6.0 - 12.0 fL Final     Platelets   Date Value Ref Range Status   08/08/2019 335 140 - 450 10*3/mm3 Final     Neutrophil %   Date Value Ref Range Status   08/08/2019 74.3 42.7 - 76.0 % Final     Lymphocyte %   Date Value Ref Range Status   08/08/2019 13.8 (L) 19.6 - 45.3 % Final     Monocyte %   Date Value Ref Range Status   08/08/2019 9.8 5.0 - 12.0 % Final     Eosinophil %   Date Value Ref Range Status   08/08/2019 1.9 0.3 - 6.2 % Final     Basophil %   Date Value Ref Range Status   08/08/2019 0.2 0.0 - 1.5 % Final     Neutrophils, Absolute   Date Value Ref Range Status   08/08/2019 6.01 1.70 - 7.00 10*3/mm3 Final     Lymphocytes, Absolute   Date Value Ref Range Status   08/08/2019 1.12 0.70 - 3.10 10*3/mm3 Final     Monocytes, Absolute   Date Value Ref Range Status   08/08/2019 0.79 0.10 - 0.90 10*3/mm3 Final     Eosinophils, Absolute   Date Value Ref Range Status   08/08/2019 0.15 0.00 - 0.40 10*3/mm3 Final      Basophils, Absolute   Date Value Ref Range Status   08/08/2019 0.02 0.00 - 0.20 10*3/mm3 Final     nRBC   Date Value Ref Range Status   11/19/2018 0 0 /100[WBCs] Final       Lab Results   Component Value Date    GLUCOSE 102 (H) 04/25/2019    BUN 9 04/25/2019    CREATININE 0.6 04/25/2019    BCR 15.0 04/25/2019    K 3.8 04/25/2019    CO2 22 04/25/2019    CALCIUM 9.0 04/25/2019    ALBUMIN 3.1 (L) 04/25/2019    LABIL2 0.8 (L) 04/25/2019    AST 18 04/25/2019    ALT 19 04/25/2019         Assessment/Plan     Diffuse large B-cell lymphoma of lymph nodes of neck (CMS/HCC)  - CBC & Differential  - CBC Auto Differential  - CT soft tissue neck w contrast  - CT chest w contrast  - CT abdomen pelvis w contrast  - Ferritin  - Comprehensive Metabolic Panel  - Sedimentation Rate  - Lactate Dehydrogenase    Encounter for care related to vascular access port    Iron deficiency anemia due to chronic blood loss    Smoking    Aortic mural thrombus (CMS/HCC)      ASSESSMENT:  Discussed patient's last CT scans that were negative and plans to repeat CT scans in November.  Discussed her echocardiogram results also.  Will check follow-up lymphoma labs today along with a ferritin level as patient is still taking 1 iron pill a day.  She claims to have been seen by Dr. Duckworth today with recommendation of no Coumadin use and hence we will decrease her dose to 1 mg for Lhcfxm-p-Nwog maintenance.    PLAN:  Ferritin.  CMP, sed rate and LDH.  CT neck, CAP in November.  Decrease warfarin to 1 mg p.o. daily for Gcdtdp-e-Nsih maintenance.       I have reviewed labs results, imaging, vitals, and medications with the patient today. Will follow up after the CT scans are done in November.     I counselled Sarah of the risks of continuing to use tobacco and cessation.    During this visit, I spent 3-10 minutes counseling the patient regarding tobacco cessation.     Patient verbalized understanding and is in agreement of the above plan.    I have  reviewed and agree with the above information.   Sanjay Larkin M.D., F.A.C.P.       Much of the above report is an electronic transcription/translation of the spoken language to printed text using Dragon Software. As such, the subtleties and finesse of the spoken language may permit erroneous, or at times, nonsensical words or phrases to be inadvertently transcribed; thus changes may be made at a later date to rectify these errors.

## 2019-08-08 ENCOUNTER — HOSPITAL ENCOUNTER (OUTPATIENT)
Dept: ONCOLOGY | Facility: HOSPITAL | Age: 64
Discharge: HOME | End: 2019-08-08

## 2019-08-08 ENCOUNTER — OFFICE VISIT (OUTPATIENT)
Dept: ONCOLOGY | Facility: CLINIC | Age: 64
End: 2019-08-08

## 2019-08-08 ENCOUNTER — APPOINTMENT (OUTPATIENT)
Dept: VASCULAR SURGERY | Facility: HOSPITAL | Age: 64
End: 2019-08-08

## 2019-08-08 ENCOUNTER — HOSPITAL ENCOUNTER (OUTPATIENT)
Dept: ONCOLOGY | Facility: HOSPITAL | Age: 64
Discharge: HOME | End: 2019-08-08
Admitting: INTERNAL MEDICINE

## 2019-08-08 VITALS
WEIGHT: 143 LBS | RESPIRATION RATE: 20 BRPM | HEIGHT: 64 IN | HEART RATE: 103 BPM | BODY MASS INDEX: 24.41 KG/M2 | TEMPERATURE: 97.4 F | SYSTOLIC BLOOD PRESSURE: 147 MMHG | DIASTOLIC BLOOD PRESSURE: 95 MMHG

## 2019-08-08 DIAGNOSIS — C83.31 DIFFUSE LARGE B-CELL LYMPHOMA OF LYMPH NODES OF NECK (HCC): ICD-10-CM

## 2019-08-08 DIAGNOSIS — D50.0 IRON DEFICIENCY ANEMIA DUE TO CHRONIC BLOOD LOSS: ICD-10-CM

## 2019-08-08 DIAGNOSIS — I74.10 AORTIC MURAL THROMBUS (HCC): ICD-10-CM

## 2019-08-08 DIAGNOSIS — Z45.2 ENCOUNTER FOR CARE RELATED TO VASCULAR ACCESS PORT: Primary | ICD-10-CM

## 2019-08-08 DIAGNOSIS — C83.31 DIFFUSE LARGE B-CELL LYMPHOMA OF LYMPH NODES OF NECK (HCC): Primary | ICD-10-CM

## 2019-08-08 DIAGNOSIS — F17.200 SMOKING: ICD-10-CM

## 2019-08-08 LAB
ALBUMIN SERPL-MCNC: 3.3 G/DL (ref 3.5–4.8)
ALBUMIN/GLOB SERPL: 0.9 G/DL (ref 1–1.7)
ALP SERPL-CCNC: 78 U/L (ref 32–91)
ALT SERPL W P-5'-P-CCNC: 20 U/L (ref 14–54)
ANION GAP SERPL CALCULATED.3IONS-SCNC: 14.2 MMOL/L (ref 5–15)
AST SERPL-CCNC: 25 U/L (ref 15–41)
BASOPHILS # BLD AUTO: 0.02 10*3/MM3 (ref 0–0.2)
BASOPHILS NFR BLD AUTO: 0.2 % (ref 0–1.5)
BILIRUB SERPL-MCNC: 0.5 MG/DL (ref 0.3–1.2)
BUN BLD-MCNC: 8 MG/DL (ref 8–20)
BUN/CREAT SERPL: 13.3 (ref 5.4–26.2)
CALCIUM SPEC-SCNC: 8.8 MG/DL (ref 8.9–10.3)
CHLORIDE SERPL-SCNC: 95 MMOL/L (ref 101–111)
CO2 SERPL-SCNC: 25 MMOL/L (ref 22–32)
CREAT BLD-MCNC: 0.6 MG/DL (ref 0.4–1)
DEPRECATED RDW RBC AUTO: 47.3 FL (ref 37–54)
EOSINOPHIL # BLD AUTO: 0.15 10*3/MM3 (ref 0–0.4)
EOSINOPHIL NFR BLD AUTO: 1.9 % (ref 0.3–6.2)
ERYTHROCYTE [DISTWIDTH] IN BLOOD BY AUTOMATED COUNT: 15.9 % (ref 12.3–15.4)
ERYTHROCYTE [SEDIMENTATION RATE] IN BLOOD: 61 MM/HR (ref 0–30)
FERRITIN SERPL-MCNC: 72 NG/ML (ref 11–307)
GFR SERPL CREATININE-BSD FRML MDRD: 101 ML/MIN/1.73
GLOBULIN UR ELPH-MCNC: 3.5 GM/DL (ref 2.5–3.8)
GLUCOSE BLD-MCNC: 113 MG/DL (ref 65–99)
HCT VFR BLD AUTO: 39.5 % (ref 34–46.6)
HGB BLD-MCNC: 13.2 G/DL (ref 12–15.9)
LDH SERPL-CCNC: 142 U/L (ref 98–192)
LYMPHOCYTES # BLD AUTO: 1.12 10*3/MM3 (ref 0.7–3.1)
LYMPHOCYTES NFR BLD AUTO: 13.8 % (ref 19.6–45.3)
MCH RBC QN AUTO: 27.4 PG (ref 26.6–33)
MCHC RBC AUTO-ENTMCNC: 33.4 G/DL (ref 31.5–35.7)
MCV RBC AUTO: 82 FL (ref 79–97)
MONOCYTES # BLD AUTO: 0.79 10*3/MM3 (ref 0.1–0.9)
MONOCYTES NFR BLD AUTO: 9.8 % (ref 5–12)
NEUTROPHILS # BLD AUTO: 6.01 10*3/MM3 (ref 1.7–7)
NEUTROPHILS NFR BLD AUTO: 74.3 % (ref 42.7–76)
PLATELET # BLD AUTO: 335 10*3/MM3 (ref 140–450)
PMV BLD AUTO: 9.4 FL (ref 6–12)
POTASSIUM BLD-SCNC: 3.2 MMOL/L (ref 3.6–5.1)
PROT SERPL-MCNC: 6.8 G/DL (ref 6.1–7.9)
RBC # BLD AUTO: 4.82 10*6/MM3 (ref 3.77–5.28)
SODIUM BLD-SCNC: 131 MMOL/L (ref 136–144)
WBC NRBC COR # BLD: 8.09 10*3/MM3 (ref 3.4–10.8)

## 2019-08-08 PROCEDURE — 80053 COMPREHEN METABOLIC PANEL: CPT | Performed by: INTERNAL MEDICINE

## 2019-08-08 PROCEDURE — 85652 RBC SED RATE AUTOMATED: CPT | Performed by: INTERNAL MEDICINE

## 2019-08-08 PROCEDURE — 96523 IRRIG DRUG DELIVERY DEVICE: CPT

## 2019-08-08 PROCEDURE — 85025 COMPLETE CBC W/AUTO DIFF WBC: CPT | Performed by: INTERNAL MEDICINE

## 2019-08-08 PROCEDURE — G0463 HOSPITAL OUTPT CLINIC VISIT: HCPCS

## 2019-08-08 PROCEDURE — 83615 LACTATE (LD) (LDH) ENZYME: CPT | Performed by: INTERNAL MEDICINE

## 2019-08-08 PROCEDURE — 99214 OFFICE O/P EST MOD 30 MIN: CPT | Performed by: INTERNAL MEDICINE

## 2019-08-08 PROCEDURE — 82728 ASSAY OF FERRITIN: CPT | Performed by: INTERNAL MEDICINE

## 2019-08-08 RX ORDER — SODIUM CHLORIDE 0.9 % (FLUSH) 0.9 %
10 SYRINGE (ML) INJECTION AS NEEDED
Status: CANCELLED | OUTPATIENT
Start: 2019-08-08

## 2019-08-08 RX ORDER — SODIUM CHLORIDE 0.9 % (FLUSH) 0.9 %
10 SYRINGE (ML) INJECTION AS NEEDED
Status: DISCONTINUED | OUTPATIENT
Start: 2019-08-08 | End: 2019-08-09 | Stop reason: HOSPADM

## 2019-08-08 RX ORDER — CEPHALEXIN 500 MG/1
CAPSULE ORAL
COMMUNITY
Start: 2019-08-06 | End: 2020-05-21

## 2019-08-08 RX ADMIN — Medication 30 ML: at 14:33

## 2019-08-08 RX ADMIN — HEPARIN 500 UNITS: 100 SYRINGE at 15:59

## 2019-08-09 ENCOUNTER — TELEPHONE (OUTPATIENT)
Dept: ONCOLOGY | Facility: CLINIC | Age: 64
End: 2019-08-09

## 2019-08-09 NOTE — TELEPHONE ENCOUNTER
----- Message from ARIADNA Hinojosa sent at 8/9/2019 11:58 AM EDT -----  Sed rate is stable.  Ferritin level looks good.  Potassium is low (3.2).  No potassium noted on medication list.  Please instruct patient to increase potassium in diet and contact PCP for further instructions (message sent to clinical pool).

## 2019-08-09 NOTE — TELEPHONE ENCOUNTER
Called the patient and informed her of the result. She stated she sees her PCP Monday. Informed her that I would forward the results to Dr. Lezama. She is in agreement to follow up with Dr. Lezama Monday regarding her low potassium.

## 2019-08-09 NOTE — PROGRESS NOTES
Sed rate is stable.  Ferritin level looks good.  Potassium is low (3.2).  No potassium noted on medication list.  Please instruct patient to increase potassium in diet and contact PCP for further instructions (message sent to clinical pool).

## 2019-08-16 ENCOUNTER — TELEPHONE (OUTPATIENT)
Dept: ONCOLOGY | Facility: HOSPITAL | Age: 64
End: 2019-08-16

## 2019-08-16 NOTE — TELEPHONE ENCOUNTER
Patient had been on antibiotics and advised to recheck INR in one week left v/m for patient to call with results.

## 2019-08-19 ENCOUNTER — TELEPHONE (OUTPATIENT)
Dept: ONCOLOGY | Facility: HOSPITAL | Age: 64
End: 2019-08-19

## 2019-08-19 ENCOUNTER — TRANSCRIBE ORDERS (OUTPATIENT)
Dept: ADMINISTRATIVE | Facility: HOSPITAL | Age: 64
End: 2019-08-19

## 2019-08-19 DIAGNOSIS — I73.9 CLAUDICATION (HCC): Primary | ICD-10-CM

## 2019-08-19 DIAGNOSIS — I74.10 AORTIC MURAL THROMBUS (HCC): ICD-10-CM

## 2019-08-19 NOTE — TELEPHONE ENCOUNTER
called to get INR results. Patient is only on 1mg for port maintenance only now no INR's neede per Dr. Larkin

## 2019-09-12 ENCOUNTER — HOSPITAL ENCOUNTER (OUTPATIENT)
Dept: ONCOLOGY | Facility: HOSPITAL | Age: 64
Discharge: HOME | End: 2019-09-12
Admitting: INTERNAL MEDICINE

## 2019-09-12 VITALS
BODY MASS INDEX: 24.79 KG/M2 | DIASTOLIC BLOOD PRESSURE: 92 MMHG | WEIGHT: 148.8 LBS | TEMPERATURE: 98.8 F | HEART RATE: 92 BPM | SYSTOLIC BLOOD PRESSURE: 164 MMHG | RESPIRATION RATE: 18 BRPM | HEIGHT: 65 IN

## 2019-09-12 DIAGNOSIS — C83.31 DIFFUSE LARGE B-CELL LYMPHOMA OF LYMPH NODES OF NECK (HCC): Primary | ICD-10-CM

## 2019-09-12 PROCEDURE — 96523 IRRIG DRUG DELIVERY DEVICE: CPT | Performed by: INTERNAL MEDICINE

## 2019-09-12 RX ORDER — SODIUM CHLORIDE 0.9 % (FLUSH) 0.9 %
10 SYRINGE (ML) INJECTION AS NEEDED
Status: DISCONTINUED | OUTPATIENT
Start: 2019-09-12 | End: 2019-09-13 | Stop reason: HOSPADM

## 2019-09-12 RX ORDER — SODIUM CHLORIDE 0.9 % (FLUSH) 0.9 %
10 SYRINGE (ML) INJECTION AS NEEDED
Status: CANCELLED | OUTPATIENT
Start: 2019-09-12

## 2019-09-12 RX ADMIN — HEPARIN 500 UNITS: 100 SYRINGE at 15:19

## 2019-09-12 RX ADMIN — Medication 30 ML: at 15:16

## 2019-10-10 ENCOUNTER — HOSPITAL ENCOUNTER (OUTPATIENT)
Dept: ONCOLOGY | Facility: HOSPITAL | Age: 64
Discharge: HOME | End: 2019-10-10
Admitting: INTERNAL MEDICINE

## 2019-10-10 VITALS
BODY MASS INDEX: 25.4 KG/M2 | SYSTOLIC BLOOD PRESSURE: 174 MMHG | TEMPERATURE: 98.2 F | HEART RATE: 85 BPM | WEIGHT: 148.8 LBS | DIASTOLIC BLOOD PRESSURE: 97 MMHG | HEIGHT: 64 IN | RESPIRATION RATE: 18 BRPM

## 2019-10-10 DIAGNOSIS — C83.31 DIFFUSE LARGE B-CELL LYMPHOMA OF LYMPH NODES OF NECK (HCC): Primary | ICD-10-CM

## 2019-10-10 PROCEDURE — 96523 IRRIG DRUG DELIVERY DEVICE: CPT | Performed by: INTERNAL MEDICINE

## 2019-10-10 RX ORDER — SODIUM CHLORIDE 0.9 % (FLUSH) 0.9 %
10 SYRINGE (ML) INJECTION AS NEEDED
Status: CANCELLED | OUTPATIENT
Start: 2019-10-10

## 2019-10-10 RX ORDER — SODIUM CHLORIDE 0.9 % (FLUSH) 0.9 %
10 SYRINGE (ML) INJECTION AS NEEDED
Status: DISCONTINUED | OUTPATIENT
Start: 2019-10-10 | End: 2019-10-11 | Stop reason: HOSPADM

## 2019-10-10 RX ADMIN — HEPARIN 500 UNITS: 100 SYRINGE at 15:14

## 2019-10-10 RX ADMIN — Medication 30 ML: at 15:13

## 2019-11-01 ENCOUNTER — HOSPITAL ENCOUNTER (OUTPATIENT)
Dept: PET IMAGING | Facility: HOSPITAL | Age: 64
Discharge: HOME | End: 2019-11-01
Admitting: INTERNAL MEDICINE

## 2019-11-01 DIAGNOSIS — C83.31 DIFFUSE LARGE B-CELL LYMPHOMA OF LYMPH NODES OF NECK (HCC): ICD-10-CM

## 2019-11-01 LAB — CREAT BLDA-MCNC: 0.6 MG/DL (ref 0.6–1.3)

## 2019-11-01 PROCEDURE — 0 IOPAMIDOL PER 1 ML: Performed by: INTERNAL MEDICINE

## 2019-11-01 PROCEDURE — 70491 CT SOFT TISSUE NECK W/DYE: CPT

## 2019-11-01 PROCEDURE — 71260 CT THORAX DX C+: CPT

## 2019-11-01 PROCEDURE — 74177 CT ABD & PELVIS W/CONTRAST: CPT

## 2019-11-01 PROCEDURE — 82565 ASSAY OF CREATININE: CPT

## 2019-11-01 RX ADMIN — IOPAMIDOL 150 ML: 755 INJECTION, SOLUTION INTRAVENOUS at 12:30

## 2019-11-13 ENCOUNTER — TELEPHONE (OUTPATIENT)
Dept: ONCOLOGY | Facility: CLINIC | Age: 64
End: 2019-11-13

## 2019-11-13 NOTE — TELEPHONE ENCOUNTER
Spoke w/ pt and informed her that we need to cancel her appt for tomorrow w/ Dr Larkin as [facility] will no longer be [facility].  She requests to see a NP tomorrow.  Pt scheduled to see Lucille Wolf NP.

## 2019-11-14 ENCOUNTER — OFFICE VISIT (OUTPATIENT)
Dept: ONCOLOGY | Facility: CLINIC | Age: 64
End: 2019-11-14

## 2019-11-14 ENCOUNTER — APPOINTMENT (OUTPATIENT)
Dept: LAB | Facility: HOSPITAL | Age: 64
End: 2019-11-14

## 2019-11-14 ENCOUNTER — APPOINTMENT (OUTPATIENT)
Dept: ONCOLOGY | Facility: CLINIC | Age: 64
End: 2019-11-14

## 2019-11-14 ENCOUNTER — HOSPITAL ENCOUNTER (OUTPATIENT)
Dept: ONCOLOGY | Facility: HOSPITAL | Age: 64
Discharge: HOME | End: 2019-11-14
Admitting: INTERNAL MEDICINE

## 2019-11-14 ENCOUNTER — APPOINTMENT (OUTPATIENT)
Dept: ONCOLOGY | Facility: HOSPITAL | Age: 64
End: 2019-11-14

## 2019-11-14 VITALS
SYSTOLIC BLOOD PRESSURE: 159 MMHG | TEMPERATURE: 97.9 F | BODY MASS INDEX: 25.36 KG/M2 | DIASTOLIC BLOOD PRESSURE: 94 MMHG | HEIGHT: 65 IN | WEIGHT: 152.2 LBS | RESPIRATION RATE: 20 BRPM | HEART RATE: 82 BPM

## 2019-11-14 DIAGNOSIS — D50.0 IRON DEFICIENCY ANEMIA DUE TO CHRONIC BLOOD LOSS: ICD-10-CM

## 2019-11-14 DIAGNOSIS — Z86.718 HISTORY OF THROMBOSIS: ICD-10-CM

## 2019-11-14 DIAGNOSIS — E87.6 HYPOKALEMIA: ICD-10-CM

## 2019-11-14 DIAGNOSIS — F17.200 SMOKING: ICD-10-CM

## 2019-11-14 DIAGNOSIS — C83.31 DIFFUSE LARGE B-CELL LYMPHOMA OF LYMPH NODES OF NECK (HCC): Primary | ICD-10-CM

## 2019-11-14 DIAGNOSIS — Z45.2 ENCOUNTER FOR CARE RELATED TO VASCULAR ACCESS PORT: ICD-10-CM

## 2019-11-14 DIAGNOSIS — C83.31 DIFFUSE LARGE B-CELL LYMPHOMA OF LYMPH NODES OF NECK (HCC): ICD-10-CM

## 2019-11-14 LAB
ALBUMIN SERPL-MCNC: 4 G/DL (ref 3.5–5.2)
ALBUMIN/GLOB SERPL: 1.2 G/DL
ALP SERPL-CCNC: 88 U/L (ref 39–117)
ALT SERPL W P-5'-P-CCNC: 21 U/L (ref 1–33)
ANION GAP SERPL CALCULATED.3IONS-SCNC: 13 MMOL/L (ref 5–15)
AST SERPL-CCNC: 22 U/L (ref 1–32)
BASOPHILS # BLD AUTO: 0.02 10*3/MM3 (ref 0–0.2)
BASOPHILS NFR BLD AUTO: 0.2 % (ref 0–1.5)
BILIRUB SERPL-MCNC: 0.3 MG/DL (ref 0.2–1.2)
BUN BLD-MCNC: 10 MG/DL (ref 8–23)
BUN/CREAT SERPL: 17.9 (ref 7–25)
CALCIUM SPEC-SCNC: 9.5 MG/DL (ref 8.6–10.5)
CHLORIDE SERPL-SCNC: 98 MMOL/L (ref 98–107)
CO2 SERPL-SCNC: 28 MMOL/L (ref 22–29)
CREAT BLD-MCNC: 0.56 MG/DL (ref 0.57–1)
DEPRECATED RDW RBC AUTO: 52.4 FL (ref 37–54)
EOSINOPHIL # BLD AUTO: 0.25 10*3/MM3 (ref 0–0.4)
EOSINOPHIL NFR BLD AUTO: 3 % (ref 0.3–6.2)
ERYTHROCYTE [DISTWIDTH] IN BLOOD BY AUTOMATED COUNT: 17.2 % (ref 12.3–15.4)
GFR SERPL CREATININE-BSD FRML MDRD: 110 ML/MIN/1.73
GLOBULIN UR ELPH-MCNC: 3.3 GM/DL
GLUCOSE BLD-MCNC: 81 MG/DL (ref 65–99)
HCT VFR BLD AUTO: 42.1 % (ref 34–46.6)
HGB BLD-MCNC: 14.2 G/DL (ref 12–15.9)
LYMPHOCYTES # BLD AUTO: 1.41 10*3/MM3 (ref 0.7–3.1)
LYMPHOCYTES NFR BLD AUTO: 17.1 % (ref 19.6–45.3)
MCH RBC QN AUTO: 28.7 PG (ref 26.6–33)
MCHC RBC AUTO-ENTMCNC: 33.7 G/DL (ref 31.5–35.7)
MCV RBC AUTO: 85.2 FL (ref 79–97)
MONOCYTES # BLD AUTO: 0.81 10*3/MM3 (ref 0.1–0.9)
MONOCYTES NFR BLD AUTO: 9.8 % (ref 5–12)
NEUTROPHILS # BLD AUTO: 5.76 10*3/MM3 (ref 1.7–7)
NEUTROPHILS NFR BLD AUTO: 69.9 % (ref 42.7–76)
PLATELET # BLD AUTO: 328 10*3/MM3 (ref 140–450)
PMV BLD AUTO: 9.1 FL (ref 6–12)
POTASSIUM BLD-SCNC: 3.4 MMOL/L (ref 3.5–5.2)
PROT SERPL-MCNC: 7.3 G/DL (ref 6–8.5)
RBC # BLD AUTO: 4.94 10*6/MM3 (ref 3.77–5.28)
SODIUM BLD-SCNC: 139 MMOL/L (ref 136–145)
WBC NRBC COR # BLD: 8.25 10*3/MM3 (ref 3.4–10.8)

## 2019-11-14 PROCEDURE — 85025 COMPLETE CBC W/AUTO DIFF WBC: CPT | Performed by: NURSE PRACTITIONER

## 2019-11-14 PROCEDURE — 80053 COMPREHEN METABOLIC PANEL: CPT | Performed by: NURSE PRACTITIONER

## 2019-11-14 PROCEDURE — 99214 OFFICE O/P EST MOD 30 MIN: CPT | Performed by: NURSE PRACTITIONER

## 2019-11-14 PROCEDURE — 96523 IRRIG DRUG DELIVERY DEVICE: CPT | Performed by: INTERNAL MEDICINE

## 2019-11-14 RX ORDER — AMLODIPINE BESYLATE 2.5 MG/1
2.5 TABLET ORAL DAILY
Refills: 2 | COMMUNITY
Start: 2019-11-07 | End: 2020-05-21

## 2019-11-14 RX ORDER — WARFARIN SODIUM 1 MG/1
1 TABLET ORAL DAILY
COMMUNITY
End: 2020-01-09

## 2019-11-14 RX ORDER — SODIUM CHLORIDE 0.9 % (FLUSH) 0.9 %
10 SYRINGE (ML) INJECTION AS NEEDED
Status: CANCELLED | OUTPATIENT
Start: 2019-11-14

## 2019-11-14 RX ORDER — VITAMIN B COMPLEX
CAPSULE ORAL DAILY
COMMUNITY
End: 2020-12-03

## 2019-11-14 RX ORDER — SODIUM CHLORIDE 0.9 % (FLUSH) 0.9 %
10 SYRINGE (ML) INJECTION AS NEEDED
Status: DISCONTINUED | OUTPATIENT
Start: 2019-11-14 | End: 2019-11-15 | Stop reason: HOSPADM

## 2019-11-14 RX ADMIN — Medication 30 ML: at 15:47

## 2019-11-14 RX ADMIN — HEPARIN 500 UNITS: 100 SYRINGE at 15:50

## 2019-11-14 NOTE — PROGRESS NOTES
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 Right tonsillar diffuse large B-cell lymphoma diagnosed 2018.  • This  female who claims to have developed a sore throat in 2018. She saw her primary care provider, Tran Vaz N.P. and was prescribed antibiotic. She did have palpable lymph nodes and a CT scan of the soft tissue neck was performed on 10/11/18 revealing asymmetric enlargement of the right palatine tonsil. There was right jugular digastric and posterior triangle adenopathy. The largest lymph node within the posterior triangle measured approximately 3.6 x 3.2 x 2.0 cm. She was referred to Sly Rosario M.D. and was seen by him in initial consultation on 18 where laryngoscopy revealed right tonsillar mass. FNA was performed on in on 10/31/18 with specimen sent for pathology, but routine staining was not performed. Flow cytometry revealed a CD10 positive monoclonal B-cell population which by light scatter analysis fell into both the small cell and large cell regions, though predominantly into the large cell region. FISH analysis for BCL2, BCL6 and MYC were negative. Though Dr. Rosario had requested routine staining of the specimen, this was not performed and Pathology had wanted a more fresh specimen, prior to which she was referred to me for consultation. The patient today is denying any weight loss. She does complain of low-grade fevers controlled with Aspirin and claims to have had night sweats on and off for 10 years.   • 18 - [facility] cell  non-Hodgkin's lymphoma of the right tonsil on referral by Sly Rosario M.D. WBC 10.9 with 69% neutrophils, 23% lymphocytes, 6% monocytes, hemoglobin 13.8, platelet count 408,000.  ().  Globulin 4.1 (2.5-3.8), uric acid 5.0 (2.6-8.0), ESR 61 (0-30).      • 11/16/18 - Bone marrow aspiration and biopsy with normocellular marrow with maturing trilineage hematopoiesis. Immunohistochemistry had no increase in CD34 positive blasts or CD20 positive B-lymphocytes. Flow cytometry had no evidence of an abnormal cell type. Cytogenetics revealed 48 XX normal female karyotype. Echocardiogram with mild mitral and mild tricuspid regurgitation, moderate concentric left ventricular hypertrophy and LV systolic function normal with EF about 65-70%.   • 11/17/18 - Echocardiogram with mild mitral and tricuspid regurgitation, moderate concentric left ventricular hypertrophy, LV systolic function normal with EF about 65-70%.   • 11/19/18 - Chest x-ray with clear lungs.   • 11/20/18 - PET scan with extensive right neck lymphadenopathy beginning at the right tonsillar pillar and continuing to the supraclavicular region with SUV between 26 and 16 with extremely hypermetabolic lymph nodes, changes of cholelithiasis and old healed granulomatous disease. Mass of the nodes at L2 are approximately 6.5 x 4.2 cm at the level 3 adenopathy is also seen over an area of 5.5 x 2.8 submandibular gland and sternocleidomastoid muscle are poorly-delineated in the adenopathy. Supraclavicular lymph node is seen measuring over 24 mm. No hypermetabolic activity in the abdomen. Mild infrarenal abdominal aortic aneurysm measuring 28 mm.   • 11/29/18 - Patient underwent Infusaport placement by Ashish Mcginnis M.D.   • 11/30/18 - WBC 8.5, hemoglobin 12.7, platelet count 358,000. Discussed workup results. Patient started on Allopurinol 300 mg p.o. daily with repeat tonsillar biopsy planned for histology. SPEP with hypoalbuminemia. Hepatitis B core  antibody surface antigen, hepatis C antibody all non-reactive.   • 12/19/18 - Patient underwent ultrasound-guided needle biopsy of right neck mass by Ashish Funk M.D. with pathology revealing diffuse large B-cell lymphoma germinal center type. Flow cytometry revealed CD10 positive B-cell lymphoma. The lymphoma was CD20 and surface kappa and lambda chain positive.   • 12/27/18 - Chemotherapy orders written for R-CHOP. Rituximab 375 mg/M2 IV day 1, Cyclophosphamide 750 mg/M2 IV day 1, Doxorubicin 50 mg/M2 IV day 1, Vincristine 1.4 mg/M2 IV day 1 with a max of 2 mg dose and Prednisone 100 mg p.o. days 3-8 to cycle every 21 days for about six cycles in a curative intent. Neulasta Onpro also ordered.    • 1/3/19 - Discussed results of workup and chemotherapy.   • 1/10/19 - Cycle 1 chemotherapy given.   • 1/17/19 - WBC 2.1 with 28% neutrophils, 49% lymphocytes, 8% monocytes, hemoglobin 12.9, platelet count 234,000.   • 1/22/19 - Patient tolerating chemotherapy well. Sed rate 49 (0-30).  ESR 49 (H).   • 1/31/19 - Cycle 2 R-CHOP initiated with Neulasta support. Sed rate 81 (0-30).  ESR 81 (H). Uric acid 2.4 (L). Sodium 133 (L).   • 2/7/19 - [facility] verbal report of WBC 1.5 and ANC 0.41. Cipro 500 mg p.o. b.i.d. x7 days as prophylaxis prescribed.   • 2/12/19 - Patient tolerating chemotherapy overall well to date with some expected taste changes and constipation. Patient advised she may use MiraLAX p.r.n. and patient advised to play with diet as well use of sour candies or mints to stimulate the taste buds. A brief period of neutropenia without febrile neutropenia noted during the last cycle with Neulasta given. Allopurinol discontinued.   • 2/21/19 - Ferritin 121 (N), iron 18 (L),  (N), iron saturation 6% (L). Prescribed Ferrous Sulfate 325 mg daily. Received cycle 3 day 1 R-CHOP. Port will not draw. Activase utilized and started on Coumadin 1 mg p.o. daily.   • 2/28/19 - Stool heme  negative x3. CT neck and chest with contrast showed previously-identified mass centered with right palatine tonsils appears to have resolved. Previously-identified right cervical lymphadenopathy has resolved. No evidence of lymphadenopathy within the chest. 4 mm left upper lobe nodule is stable from prior PET CT.  This is indeterminate. Recommend attention to follow up. Echocardiogram showed aortic valve sclerosis, EF 65-70%. No pericardial effusion.   • 3/6/19 - Discussed with the patient the option of either continuing with chemotherapy for a total of six cycles versus stopping chemotherapy and proceeding with radiation to her site of disease. Patient would like to complete planned chemotherapy course.   • 3/6/19 - PT 11.8, INR 1.0.  Patient continued on Coumadin 1 mg p.o. daily for port malfunction.  ESR 64, high.    • 3/14/19 - Cycle 4 chemotherapy given.   • 4/4/19 - Cycle 5 chemotherapy given.   • 4/16/19 - Patient complains of upper respiratory symptoms which are improving on Cipro.   • 4/25/19 - Cycle 6 chemotherapy given. Urinalysis showed trace heme.   • 5/5/19 - . Prescribed Ciprofloxacin 500 mg p.o. b.i.d. x7 days.   • 5/8/19 - Echocardiogram 60-65% ejection fraction with mild mitral and tricuspid regurgitation. CT soft tissue neck with no acute process or adenopathy seen. CT chest, abdomen and pelvis showed no acute cardiopulmonary process, no suspicious lymphadenopathy. Previously described 4 mm nodule in the left upper lobe is likely an intrafissural lymph node. No acute intraabdominal or intrapelvic process. No suspicious lymphadenopathy. Atherosclerosis with infrarenal abdominal aortic ectasia/aneurysm with mural thrombus formation. Coumadin increased to 5 mg p.o. Q.PM.  • 8/8/2019 Coumadin dose decreased to 1 mg p.o. daily for [id number]-x-[id number] maintenance. Sed rate 61 (0-30).  ().  • 11/1/19 - CT soft tissue neck showed no evidence of recurrent or metastatic disease in the neck in  this patient with a history of lymphoma. No acute findings. CT chest abdomen and pelvis showed No convincing evidence of recurrent or metastatic disease in the chest, abdomen or pelvis. Stable 3.2 cm fusiform infrarenal abdominal aortic aneurysm with 2% luminal stenosis secondary to atherosclerotic plaquing, and irregular ulcerated plaquing. Stable 4 mm noncalcified left upper lobe pulmonary nodule since 11/20/2018. Benign etiology is favored. This may represent a noncalcified granuloma, given the extensive granulomatous changes elsewhere within the chest.  Uncomplicated cholelithiasis.  2.   Iron deficiency anemia diagnosed February 2018.   • 1/31/19 - Hemoglobin 11.9, MCV 87.9 and RDW 14.9.   • 2/21/19 - Ferritin 121 (N), iron 18 (L),  (N), iron saturation 6 (L). Prescribed Ferrous Sulfate 325 mg one tablet p.o. daily.   • 2/28/19 - Stool heme negative x3.   • 3/26/19 - Patient reports she is not taking ferrous sulfate daily as prescribed.  She chose to increase her intake of red meat.  Instructed to start taking ferrous sulfate 325 mg p.o. daily.  Explained rationale.   • 5/14/19 - 160 (). Iron 17 (). TIBC 283 (228-428). Iron saturation 6 (15-50).  Ferritin 160.  • 8/8/2019 hemoglobin 13.2, MCV 82. Ferritin 72 ().   3.   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   • 5/9/19 - Prescribed Coumadin 5 mg p.o. daily. Referred to Dr. Duckworth for evaluation of infrarenal aortic aneurysm and mural thrombus seen on CT abdomen and pelvis.   • 5/14/19 - INR 1.4. Increased Coumadin to 6 mg p.o. daily. Follow INR protocol. INR’s to be done at [facility]. Prescription given.  • 8/8/2019 patient claims to have been seen by Dr. Duckworth and advised to stop warfarin.    Past Medical History:   Diagnosis Date   • Hypertension 1998       History reviewed. No pertinent surgical history.      Current Outpatient Medications:   •  amLODIPine (NORVASC) 2.5 MG tablet, Take  2.5 mg by mouth Daily., Disp: , Rfl: 2  •  B Complex Vitamins (VITAMIN B COMPLEX) capsule capsule, Take  by mouth Daily., Disp: , Rfl:   •  Calcium-Magnesium-Vitamin D (CALCIUM 500 PO), Take  by mouth Daily., Disp: , Rfl:   •  Cholecalciferol (VITAMIN D) 2000 units capsule, Take  by mouth Daily., Disp: , Rfl:   •  hydrochlorothiazide (HYDRODIURIL) 25 MG tablet, Take 25 mg by mouth Daily., Disp: , Rfl:   •  Loratadine (CLARITIN) 10 MG capsule, Take  by mouth Daily., Disp: , Rfl:   •  metoprolol tartrate (LOPRESSOR) 50 MG tablet, Take 50 mg by mouth Daily., Disp: , Rfl:   •  Potassium 99 MG tablet, Take  by mouth Daily., Disp: , Rfl:   •  warfarin (COUMADIN) 1 MG tablet, Take 1 mg by mouth Daily., Disp: , Rfl:   •  B Complex Vitamins (VITAMIN B COMPLEX 100 IJ), Inject  as directed., Disp: , Rfl:   •  cephalexin (KEFLEX) 500 MG capsule, , Disp: , Rfl:   •  lisinopril (PRINIVIL,ZESTRIL) 10 MG tablet, Take 10 mg by mouth Daily., Disp: , Rfl:   •  omeprazole (priLOSEC) 40 MG capsule, Take 40 mg by mouth Daily., Disp: , Rfl:   •  ondansetron (ZOFRAN) 8 MG tablet, Take  by mouth Every 8 (Eight) Hours As Needed for Nausea or Vomiting., Disp: , Rfl:   •  promethazine (PHENERGAN) 25 MG tablet, Take 25 mg by mouth Every 6 (Six) Hours As Needed for Nausea or Vomiting., Disp: , Rfl:   •  rosuvastatin (CRESTOR) 5 MG tablet, Take 5 mg by mouth Daily., Disp: , Rfl:   •  warfarin (COUMADIN) 6 MG tablet, Take 1 tablet by mouth Daily. (Patient taking differently: Take 7 mg by mouth Daily.), Disp: 30 tablet, Rfl: 3  No current facility-administered medications for this visit.     Facility-Administered Medications Ordered in Other Visits:   •  heparin flush (porcine) 100 UNIT/ML injection 500 Units, 500 Units, Intravenous, PRN, Sanjay Larkin MD, 500 Units at 11/14/19 1550  •  sodium chloride 0.9 % flush 10 mL, 10 mL, Intravenous, PRN, Sanjay Larkin MD, 30 mL at 11/14/19 6993    No Known Allergies    Family History   Problem  Relation Age of Onset   • Kidney cancer Father 66   • Leukemia Brother 48   • Multiple myeloma Brother 47       Cancer-related family history includes Kidney cancer (age of onset: 66) in her father.    Social History     Tobacco Use   • Smoking status: Current Every Day Smoker     Packs/day: 0.50     Years: 10.00     Pack years: 5.00     Types: Cigarettes   • Smokeless tobacco: Never Used   • Tobacco comment: started smoking when she was in her 20’s   Substance Use Topics   • Alcohol use: No     Frequency: Never   • Drug use: No       I have reviewed the history of present illness, past medical history, family history, social history, lab results, all notes and other records since the patient was last seen on 8/8/19.     SUBJECTIVE: Patient is here for follow up of right tonsillar diffuse large B-cell lymphoma.  Patient reports that she is doing well.  We reviewed her CT scans.  She denies any fever, night sweats or recent infection.  She states that she was seen by vascular surgery but was unhappy with the visit and would like a referral to a different vascular surgeon.  She also states she has not yet had follow-up with ENT following treatment.        REVIEW OF SYSTEMS:  Review of Systems   Constitutional: Negative for chills and fever.   HENT: Negative for ear pain, mouth sores, nosebleeds and sore throat.    Eyes: Negative for photophobia and visual disturbance.   Respiratory: Negative for wheezing and stridor.    Cardiovascular: Negative for chest pain and palpitations.   Gastrointestinal: Negative for abdominal pain, diarrhea, nausea and vomiting.   Endocrine: Negative for cold intolerance and heat intolerance.   Genitourinary: Negative for dysuria and hematuria.   Musculoskeletal: Negative for joint swelling and neck stiffness.   Skin: Negative for color change and rash.   Neurological: Negative for seizures and syncope.   Hematological: Negative for adenopathy.        No obvious bleeding  "  Psychiatric/Behavioral: Negative for agitation, confusion and hallucinations.       OBJECTIVE:    Vitals:    11/14/19 1349   BP: 159/94   Pulse: 82   Resp: 20   Temp: 97.9 °F (36.6 °C)   Weight: 69 kg (152 lb 3.2 oz)   Height: 163.8 cm (64.5\")   PainSc: 0-No pain       ECOG  (0) Fully active, able to carry on all predisease performance without restriction    Physical Exam   Constitutional: She is oriented to person, place, and time. No distress.   HENT:   Head: Normocephalic and atraumatic.   Eye glasses present. Dental fillings.    Eyes: Conjunctivae and EOM are normal. Right eye exhibits no discharge. Left eye exhibits no discharge. No scleral icterus.   Neck: Normal range of motion. Neck supple. No thyromegaly present.   Cardiovascular: Normal rate, regular rhythm and normal heart sounds. Exam reveals no gallop and no friction rub.   Pulmonary/Chest: Effort normal. No stridor. No respiratory distress. She has no wheezes.   Left chest wall port.   Abdominal: Soft. Bowel sounds are normal. She exhibits no mass. There is no tenderness. There is no rebound and no guarding.   Musculoskeletal: Normal range of motion. She exhibits no tenderness.   Degenerative changes.    Lymphadenopathy:     She has no cervical adenopathy.   Neurological: She is alert and oriented to person, place, and time. She exhibits normal muscle tone.   Skin: Skin is warm. No rash noted. She is not diaphoretic. No erythema.   Psychiatric: She has a normal mood and affect. Her behavior is normal.   Nursing note and vitals reviewed.      RECENT LABS  WBC   Date Value Ref Range Status   11/14/2019 8.25 3.40 - 10.80 10*3/mm3 Final     RBC   Date Value Ref Range Status   11/14/2019 4.94 3.77 - 5.28 10*6/mm3 Final     Hemoglobin   Date Value Ref Range Status   11/14/2019 14.2 12.0 - 15.9 g/dL Final     Hematocrit   Date Value Ref Range Status   11/14/2019 42.1 34.0 - 46.6 % Final     MCV   Date Value Ref Range Status   11/14/2019 85.2 79.0 - 97.0 fL " Final     MCH   Date Value Ref Range Status   11/14/2019 28.7 26.6 - 33.0 pg Final     MCHC   Date Value Ref Range Status   11/14/2019 33.7 31.5 - 35.7 g/dL Final     RDW   Date Value Ref Range Status   11/14/2019 17.2 (H) 12.3 - 15.4 % Final     RDW-SD   Date Value Ref Range Status   11/14/2019 52.4 37.0 - 54.0 fl Final     MPV   Date Value Ref Range Status   11/14/2019 9.1 6.0 - 12.0 fL Final     Platelets   Date Value Ref Range Status   11/14/2019 328 140 - 450 10*3/mm3 Final     Neutrophil %   Date Value Ref Range Status   11/14/2019 69.9 42.7 - 76.0 % Final     Lymphocyte %   Date Value Ref Range Status   11/14/2019 17.1 (L) 19.6 - 45.3 % Final     Monocyte %   Date Value Ref Range Status   11/14/2019 9.8 5.0 - 12.0 % Final     Eosinophil %   Date Value Ref Range Status   11/14/2019 3.0 0.3 - 6.2 % Final     Basophil %   Date Value Ref Range Status   11/14/2019 0.2 0.0 - 1.5 % Final     Neutrophils, Absolute   Date Value Ref Range Status   11/14/2019 5.76 1.70 - 7.00 10*3/mm3 Final     Lymphocytes, Absolute   Date Value Ref Range Status   11/14/2019 1.41 0.70 - 3.10 10*3/mm3 Final     Monocytes, Absolute   Date Value Ref Range Status   11/14/2019 0.81 0.10 - 0.90 10*3/mm3 Final     Eosinophils, Absolute   Date Value Ref Range Status   11/14/2019 0.25 0.00 - 0.40 10*3/mm3 Final     Basophils, Absolute   Date Value Ref Range Status   11/14/2019 0.02 0.00 - 0.20 10*3/mm3 Final     nRBC   Date Value Ref Range Status   11/19/2018 0 0 /100[WBCs] Final       Lab Results   Component Value Date    GLUCOSE 113 (H) 08/08/2019    BUN 8 08/08/2019    CREATININE 0.60 11/01/2019    EGFRIFNONA 101 08/08/2019    BCR 13.3 08/08/2019    K 3.2 (L) 08/08/2019    CO2 25.0 08/08/2019    CALCIUM 8.8 (L) 08/08/2019    ALBUMIN 3.30 (L) 08/08/2019    LABIL2 0.8 (L) 04/25/2019    AST 25 08/08/2019    ALT 20 08/08/2019         Assessment/Plan     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 IPI 1 low  - CBC & Differential  - CBC Auto  Differential  - CT Soft Tissue Neck With Contrast  - CT Chest With Contrast  - CBC & Differential  - CBC Auto Differential    Encounter for care related to vascular access port    Iron deficiency anemia due to chronic blood loss    Smoking    Diffuse large B-cell lymphoma of lymph nodes of neck (CMS/HCC)  - CBC & Differential  - CBC Auto Differential  - CT Soft Tissue Neck With Contrast  - CT Chest With Contrast  - CBC & Differential  - CBC Auto Differential    Hypokalemia  - Comprehensive Metabolic Panel    History of thrombosis  - Ambulatory Referral to Vascular Surgery      ASSESSMENT:  Discussed patient's last CT scans that were negative and plans to repeat CT scans in May.  Asked patient to follow up with Dr. Rosario for surveillance exam.    PLAN:  CT Scan Neck Chest in 6 months  Follow ENT-Dr. Rosario for routine surveillance  Follow up in 6 months after scans  Flu shot today  Referral to Dr. Hernandez (Patient requesting new vascular)         I have reviewed labs results, imaging, vitals, and medications with the patient today. Will follow up after the CT scans are done in November.     I counselled Sarah of the risks of continuing to use tobacco and cessation.    During this visit, I spent 3-10 minutes counseling the patient regarding tobacco cessation.     Patient verbalized understanding and is in agreement of the above plan.    I have reviewed and agree with the above information.

## 2019-11-25 ENCOUNTER — TELEPHONE (OUTPATIENT)
Dept: ONCOLOGY | Facility: CLINIC | Age: 64
End: 2019-11-25

## 2019-11-25 NOTE — TELEPHONE ENCOUNTER
Received message from Kathy sorto/Dr. Hernandez's office.  She states Dr. Hernandez received and reviewed patient's medical records and he has nothing to offer this patient.  Patient needs to f/u with GP.  cecelia Guevara

## 2019-11-26 NOTE — TELEPHONE ENCOUNTER
Informed patient that per Dr. Maldonado, he has nothing to offer this patient and to f/u with GP.  Patient v/u. obi Guevaraa

## 2019-12-17 ENCOUNTER — HOSPITAL ENCOUNTER (OUTPATIENT)
Dept: ONCOLOGY | Facility: HOSPITAL | Age: 64
Discharge: HOME | End: 2019-12-17
Admitting: INTERNAL MEDICINE

## 2019-12-17 VITALS
RESPIRATION RATE: 18 BRPM | SYSTOLIC BLOOD PRESSURE: 159 MMHG | WEIGHT: 158.8 LBS | TEMPERATURE: 97.8 F | HEART RATE: 78 BPM | DIASTOLIC BLOOD PRESSURE: 90 MMHG | BODY MASS INDEX: 27.11 KG/M2 | HEIGHT: 64 IN

## 2019-12-17 DIAGNOSIS — C83.31 DIFFUSE LARGE B-CELL LYMPHOMA OF LYMPH NODES OF NECK (HCC): Primary | ICD-10-CM

## 2019-12-17 PROCEDURE — 36591 DRAW BLOOD OFF VENOUS DEVICE: CPT | Performed by: INTERNAL MEDICINE

## 2019-12-17 RX ORDER — SODIUM CHLORIDE 0.9 % (FLUSH) 0.9 %
10 SYRINGE (ML) INJECTION AS NEEDED
Status: CANCELLED | OUTPATIENT
Start: 2019-12-17

## 2019-12-17 RX ORDER — SODIUM CHLORIDE 0.9 % (FLUSH) 0.9 %
10 SYRINGE (ML) INJECTION AS NEEDED
Status: DISCONTINUED | OUTPATIENT
Start: 2019-12-17 | End: 2019-12-19 | Stop reason: HOSPADM

## 2019-12-17 RX ADMIN — HEPARIN 500 UNITS: 100 SYRINGE at 14:35

## 2019-12-17 RX ADMIN — Medication 30 ML: at 14:33

## 2020-01-09 RX ORDER — WARFARIN SODIUM 1 MG/1
TABLET ORAL
Qty: 30 TABLET | Refills: 3 | Status: SHIPPED | OUTPATIENT
Start: 2020-01-09 | End: 2021-01-19 | Stop reason: SDUPTHER

## 2020-01-14 ENCOUNTER — HOSPITAL ENCOUNTER (OUTPATIENT)
Dept: ONCOLOGY | Facility: HOSPITAL | Age: 65
Discharge: HOME | End: 2020-01-14
Admitting: INTERNAL MEDICINE

## 2020-01-14 VITALS
SYSTOLIC BLOOD PRESSURE: 144 MMHG | HEIGHT: 65 IN | BODY MASS INDEX: 26.59 KG/M2 | HEART RATE: 89 BPM | DIASTOLIC BLOOD PRESSURE: 76 MMHG | RESPIRATION RATE: 18 BRPM | WEIGHT: 159.6 LBS | TEMPERATURE: 98.2 F

## 2020-01-14 DIAGNOSIS — C83.31 DIFFUSE LARGE B-CELL LYMPHOMA OF LYMPH NODES OF NECK (HCC): Primary | ICD-10-CM

## 2020-01-14 PROCEDURE — 96523 IRRIG DRUG DELIVERY DEVICE: CPT | Performed by: INTERNAL MEDICINE

## 2020-01-14 RX ORDER — SODIUM CHLORIDE 0.9 % (FLUSH) 0.9 %
10 SYRINGE (ML) INJECTION AS NEEDED
Status: DISCONTINUED | OUTPATIENT
Start: 2020-01-14 | End: 2020-01-16 | Stop reason: HOSPADM

## 2020-01-14 RX ORDER — SODIUM CHLORIDE 0.9 % (FLUSH) 0.9 %
10 SYRINGE (ML) INJECTION AS NEEDED
Status: CANCELLED | OUTPATIENT
Start: 2020-01-14

## 2020-01-14 RX ORDER — HEPARIN SODIUM (PORCINE) LOCK FLUSH IV SOLN 100 UNIT/ML 100 UNIT/ML
500 SOLUTION INTRAVENOUS AS NEEDED
Status: CANCELLED | OUTPATIENT
Start: 2020-01-14

## 2020-01-14 RX ADMIN — HEPARIN 500 UNITS: 100 SYRINGE at 14:11

## 2020-01-14 RX ADMIN — Medication 30 ML: at 14:08

## 2020-02-11 ENCOUNTER — HOSPITAL ENCOUNTER (OUTPATIENT)
Dept: ONCOLOGY | Facility: HOSPITAL | Age: 65
Discharge: HOME | End: 2020-02-11
Admitting: INTERNAL MEDICINE

## 2020-02-11 VITALS
TEMPERATURE: 98 F | DIASTOLIC BLOOD PRESSURE: 78 MMHG | RESPIRATION RATE: 18 BRPM | SYSTOLIC BLOOD PRESSURE: 146 MMHG | HEIGHT: 64 IN | HEART RATE: 89 BPM | BODY MASS INDEX: 27.66 KG/M2 | WEIGHT: 162 LBS

## 2020-02-11 DIAGNOSIS — C83.31 DIFFUSE LARGE B-CELL LYMPHOMA OF LYMPH NODES OF NECK (HCC): Primary | ICD-10-CM

## 2020-02-11 PROCEDURE — 96523 IRRIG DRUG DELIVERY DEVICE: CPT

## 2020-02-11 PROCEDURE — 25010000003 HEPARIN LOCK FLUCH PER 10 UNITS: Performed by: INTERNAL MEDICINE

## 2020-02-11 RX ORDER — HEPARIN SODIUM (PORCINE) LOCK FLUSH IV SOLN 100 UNIT/ML 100 UNIT/ML
500 SOLUTION INTRAVENOUS AS NEEDED
Status: DISCONTINUED | OUTPATIENT
Start: 2020-02-11 | End: 2020-02-12 | Stop reason: HOSPADM

## 2020-02-11 RX ORDER — SODIUM CHLORIDE 0.9 % (FLUSH) 0.9 %
10 SYRINGE (ML) INJECTION AS NEEDED
Status: CANCELLED | OUTPATIENT
Start: 2020-02-11

## 2020-02-11 RX ORDER — HEPARIN SODIUM (PORCINE) LOCK FLUSH IV SOLN 100 UNIT/ML 100 UNIT/ML
500 SOLUTION INTRAVENOUS AS NEEDED
Status: CANCELLED | OUTPATIENT
Start: 2020-02-11

## 2020-02-11 RX ORDER — SODIUM CHLORIDE 0.9 % (FLUSH) 0.9 %
10 SYRINGE (ML) INJECTION AS NEEDED
Status: DISCONTINUED | OUTPATIENT
Start: 2020-02-11 | End: 2020-02-12 | Stop reason: HOSPADM

## 2020-02-11 RX ADMIN — Medication 30 ML: at 14:00

## 2020-02-11 RX ADMIN — HEPARIN 500 UNITS: 100 SYRINGE at 14:01

## 2020-03-10 ENCOUNTER — HOSPITAL ENCOUNTER (OUTPATIENT)
Dept: ONCOLOGY | Facility: HOSPITAL | Age: 65
Discharge: HOME | End: 2020-03-10
Admitting: INTERNAL MEDICINE

## 2020-03-10 VITALS
RESPIRATION RATE: 18 BRPM | SYSTOLIC BLOOD PRESSURE: 122 MMHG | WEIGHT: 162.6 LBS | BODY MASS INDEX: 27.09 KG/M2 | DIASTOLIC BLOOD PRESSURE: 76 MMHG | TEMPERATURE: 98.9 F | HEART RATE: 91 BPM | HEIGHT: 65 IN

## 2020-03-10 DIAGNOSIS — C83.31 DIFFUSE LARGE B-CELL LYMPHOMA OF LYMPH NODES OF NECK (HCC): Primary | ICD-10-CM

## 2020-03-10 PROCEDURE — 96523 IRRIG DRUG DELIVERY DEVICE: CPT

## 2020-03-10 PROCEDURE — 25010000003 HEPARIN LOCK FLUCH PER 10 UNITS: Performed by: INTERNAL MEDICINE

## 2020-03-10 RX ORDER — SODIUM CHLORIDE 0.9 % (FLUSH) 0.9 %
10 SYRINGE (ML) INJECTION AS NEEDED
Status: DISCONTINUED | OUTPATIENT
Start: 2020-03-10 | End: 2020-03-11 | Stop reason: HOSPADM

## 2020-03-10 RX ORDER — SODIUM CHLORIDE 0.9 % (FLUSH) 0.9 %
10 SYRINGE (ML) INJECTION AS NEEDED
Status: CANCELLED | OUTPATIENT
Start: 2020-03-10

## 2020-03-10 RX ORDER — HEPARIN SODIUM (PORCINE) LOCK FLUSH IV SOLN 100 UNIT/ML 100 UNIT/ML
500 SOLUTION INTRAVENOUS AS NEEDED
Status: CANCELLED | OUTPATIENT
Start: 2020-03-10

## 2020-03-10 RX ORDER — HEPARIN SODIUM (PORCINE) LOCK FLUSH IV SOLN 100 UNIT/ML 100 UNIT/ML
500 SOLUTION INTRAVENOUS AS NEEDED
Status: DISCONTINUED | OUTPATIENT
Start: 2020-03-10 | End: 2020-03-11 | Stop reason: HOSPADM

## 2020-03-10 RX ADMIN — HEPARIN 500 UNITS: 100 SYRINGE at 14:03

## 2020-03-10 RX ADMIN — Medication 30 ML: at 13:59

## 2020-04-21 ENCOUNTER — HOSPITAL ENCOUNTER (OUTPATIENT)
Dept: ONCOLOGY | Facility: HOSPITAL | Age: 65
Discharge: HOME | End: 2020-04-21
Admitting: INTERNAL MEDICINE

## 2020-04-21 DIAGNOSIS — C83.31 DIFFUSE LARGE B-CELL LYMPHOMA OF LYMPH NODES OF NECK (HCC): Primary | ICD-10-CM

## 2020-04-21 PROCEDURE — 96523 IRRIG DRUG DELIVERY DEVICE: CPT

## 2020-04-21 PROCEDURE — 25010000003 HEPARIN LOCK FLUCH PER 10 UNITS: Performed by: INTERNAL MEDICINE

## 2020-04-21 RX ORDER — SODIUM CHLORIDE 0.9 % (FLUSH) 0.9 %
10 SYRINGE (ML) INJECTION AS NEEDED
Status: DISCONTINUED | OUTPATIENT
Start: 2020-04-21 | End: 2020-04-22 | Stop reason: HOSPADM

## 2020-04-21 RX ORDER — SODIUM CHLORIDE 0.9 % (FLUSH) 0.9 %
10 SYRINGE (ML) INJECTION AS NEEDED
Status: CANCELLED | OUTPATIENT
Start: 2020-04-21

## 2020-04-21 RX ORDER — HEPARIN SODIUM (PORCINE) LOCK FLUSH IV SOLN 100 UNIT/ML 100 UNIT/ML
500 SOLUTION INTRAVENOUS AS NEEDED
Status: CANCELLED | OUTPATIENT
Start: 2020-04-21

## 2020-04-21 RX ORDER — HEPARIN SODIUM (PORCINE) LOCK FLUSH IV SOLN 100 UNIT/ML 100 UNIT/ML
500 SOLUTION INTRAVENOUS AS NEEDED
Status: DISCONTINUED | OUTPATIENT
Start: 2020-04-21 | End: 2020-04-22 | Stop reason: HOSPADM

## 2020-04-21 RX ADMIN — Medication 30 ML: at 14:02

## 2020-04-21 RX ADMIN — HEPARIN 500 UNITS: 100 SYRINGE at 14:03

## 2020-05-12 ENCOUNTER — HOSPITAL ENCOUNTER (OUTPATIENT)
Dept: PET IMAGING | Facility: HOSPITAL | Age: 65
Discharge: HOME | End: 2020-05-12
Admitting: INTERNAL MEDICINE

## 2020-05-12 DIAGNOSIS — C83.31 DIFFUSE LARGE B-CELL LYMPHOMA OF LYMPH NODES OF NECK (HCC): ICD-10-CM

## 2020-05-12 LAB — CREAT BLDA-MCNC: 0.7 MG/DL (ref 0.6–1.3)

## 2020-05-12 PROCEDURE — 71260 CT THORAX DX C+: CPT

## 2020-05-12 PROCEDURE — 0 IOPAMIDOL PER 1 ML: Performed by: INTERNAL MEDICINE

## 2020-05-12 PROCEDURE — 70491 CT SOFT TISSUE NECK W/DYE: CPT

## 2020-05-12 PROCEDURE — 82565 ASSAY OF CREATININE: CPT

## 2020-05-12 RX ADMIN — IOPAMIDOL 150 ML: 755 INJECTION, SOLUTION INTRAVENOUS at 13:50

## 2020-05-21 ENCOUNTER — OFFICE VISIT (OUTPATIENT)
Dept: ONCOLOGY | Facility: CLINIC | Age: 65
End: 2020-05-21

## 2020-05-21 ENCOUNTER — HOSPITAL ENCOUNTER (OUTPATIENT)
Dept: ONCOLOGY | Facility: HOSPITAL | Age: 65
Discharge: HOME | End: 2020-05-21
Admitting: INTERNAL MEDICINE

## 2020-05-21 VITALS
BODY MASS INDEX: 26.79 KG/M2 | SYSTOLIC BLOOD PRESSURE: 159 MMHG | RESPIRATION RATE: 18 BRPM | TEMPERATURE: 97.3 F | HEART RATE: 73 BPM | HEIGHT: 65 IN | DIASTOLIC BLOOD PRESSURE: 100 MMHG | WEIGHT: 160.8 LBS

## 2020-05-21 DIAGNOSIS — D50.0 IRON DEFICIENCY ANEMIA DUE TO CHRONIC BLOOD LOSS: ICD-10-CM

## 2020-05-21 DIAGNOSIS — C83.31 DIFFUSE LARGE B-CELL LYMPHOMA OF LYMPH NODES OF NECK (HCC): Primary | ICD-10-CM

## 2020-05-21 DIAGNOSIS — N63.0 BREAST NODULE: ICD-10-CM

## 2020-05-21 LAB
ALBUMIN SERPL-MCNC: 3.6 G/DL (ref 3.5–5.2)
ALBUMIN/GLOB SERPL: 1.2 G/DL
ALP SERPL-CCNC: 94 U/L (ref 39–117)
ALT SERPL W P-5'-P-CCNC: 17 U/L (ref 1–33)
ANION GAP SERPL CALCULATED.3IONS-SCNC: 12 MMOL/L (ref 5–15)
AST SERPL-CCNC: 19 U/L (ref 1–32)
BASOPHILS # BLD AUTO: 0.02 10*3/MM3 (ref 0–0.2)
BASOPHILS NFR BLD AUTO: 0.2 % (ref 0–1.5)
BILIRUB SERPL-MCNC: 0.2 MG/DL (ref 0.2–1.2)
BUN BLD-MCNC: 6 MG/DL (ref 8–23)
BUN/CREAT SERPL: 10.9 (ref 7–25)
CALCIUM SPEC-SCNC: 8.6 MG/DL (ref 8.6–10.5)
CHLORIDE SERPL-SCNC: 103 MMOL/L (ref 98–107)
CO2 SERPL-SCNC: 25 MMOL/L (ref 22–29)
CREAT BLD-MCNC: 0.55 MG/DL (ref 0.57–1)
DEPRECATED RDW RBC AUTO: 45.6 FL (ref 37–54)
EOSINOPHIL # BLD AUTO: 0.33 10*3/MM3 (ref 0–0.4)
EOSINOPHIL NFR BLD AUTO: 3.7 % (ref 0.3–6.2)
ERYTHROCYTE [DISTWIDTH] IN BLOOD BY AUTOMATED COUNT: 14 % (ref 12.3–15.4)
FERRITIN SERPL-MCNC: 107.3 NG/ML (ref 13–150)
GFR SERPL CREATININE-BSD FRML MDRD: 112 ML/MIN/1.73
GLOBULIN UR ELPH-MCNC: 3 GM/DL
GLUCOSE BLD-MCNC: 94 MG/DL (ref 65–99)
HCT VFR BLD AUTO: 43.2 % (ref 34–46.6)
HGB BLD-MCNC: 14.6 G/DL (ref 12–15.9)
IRON 24H UR-MRATE: 57 MCG/DL (ref 37–145)
IRON SATN MFR SERPL: 17 % (ref 20–50)
LDH SERPL-CCNC: 147 U/L (ref 135–214)
LYMPHOCYTES # BLD AUTO: 1.77 10*3/MM3 (ref 0.7–3.1)
LYMPHOCYTES NFR BLD AUTO: 19.7 % (ref 19.6–45.3)
MCH RBC QN AUTO: 30.6 PG (ref 26.6–33)
MCHC RBC AUTO-ENTMCNC: 33.8 G/DL (ref 31.5–35.7)
MCV RBC AUTO: 90.6 FL (ref 79–97)
MONOCYTES # BLD AUTO: 0.79 10*3/MM3 (ref 0.1–0.9)
MONOCYTES NFR BLD AUTO: 8.8 % (ref 5–12)
NEUTROPHILS # BLD AUTO: 6.08 10*3/MM3 (ref 1.7–7)
NEUTROPHILS NFR BLD AUTO: 67.6 % (ref 42.7–76)
PLATELET # BLD AUTO: 341 10*3/MM3 (ref 140–450)
PMV BLD AUTO: 10.1 FL (ref 6–12)
POTASSIUM BLD-SCNC: 3.6 MMOL/L (ref 3.5–5.2)
PROT SERPL-MCNC: 6.6 G/DL (ref 6–8.5)
RBC # BLD AUTO: 4.77 10*6/MM3 (ref 3.77–5.28)
SODIUM BLD-SCNC: 140 MMOL/L (ref 136–145)
TIBC SERPL-MCNC: 332 MCG/DL (ref 298–536)
TRANSFERRIN SERPL-MCNC: 223 MG/DL (ref 200–360)
WBC NRBC COR # BLD: 8.99 10*3/MM3 (ref 3.4–10.8)

## 2020-05-21 PROCEDURE — 85025 COMPLETE CBC W/AUTO DIFF WBC: CPT | Performed by: INTERNAL MEDICINE

## 2020-05-21 PROCEDURE — 25010000003 HEPARIN LOCK FLUSH PER 10 UNITS: Performed by: INTERNAL MEDICINE

## 2020-05-21 PROCEDURE — 36591 DRAW BLOOD OFF VENOUS DEVICE: CPT

## 2020-05-21 PROCEDURE — 82728 ASSAY OF FERRITIN: CPT | Performed by: INTERNAL MEDICINE

## 2020-05-21 PROCEDURE — 84466 ASSAY OF TRANSFERRIN: CPT | Performed by: INTERNAL MEDICINE

## 2020-05-21 PROCEDURE — 99215 OFFICE O/P EST HI 40 MIN: CPT | Performed by: INTERNAL MEDICINE

## 2020-05-21 PROCEDURE — 80053 COMPREHEN METABOLIC PANEL: CPT | Performed by: INTERNAL MEDICINE

## 2020-05-21 PROCEDURE — 83615 LACTATE (LD) (LDH) ENZYME: CPT | Performed by: INTERNAL MEDICINE

## 2020-05-21 PROCEDURE — 83540 ASSAY OF IRON: CPT | Performed by: INTERNAL MEDICINE

## 2020-05-21 RX ORDER — HEPARIN SODIUM (PORCINE) LOCK FLUSH IV SOLN 100 UNIT/ML 100 UNIT/ML
500 SOLUTION INTRAVENOUS AS NEEDED
Status: DISCONTINUED | OUTPATIENT
Start: 2020-05-21 | End: 2020-05-22 | Stop reason: HOSPADM

## 2020-05-21 RX ORDER — SODIUM CHLORIDE 0.9 % (FLUSH) 0.9 %
10 SYRINGE (ML) INJECTION AS NEEDED
Status: DISCONTINUED | OUTPATIENT
Start: 2020-05-21 | End: 2020-05-22 | Stop reason: HOSPADM

## 2020-05-21 RX ORDER — HEPARIN SODIUM (PORCINE) LOCK FLUSH IV SOLN 100 UNIT/ML 100 UNIT/ML
500 SOLUTION INTRAVENOUS AS NEEDED
Status: CANCELLED | OUTPATIENT
Start: 2020-05-21

## 2020-05-21 RX ORDER — SODIUM CHLORIDE 0.9 % (FLUSH) 0.9 %
10 SYRINGE (ML) INJECTION AS NEEDED
Status: CANCELLED | OUTPATIENT
Start: 2020-05-21

## 2020-05-21 RX ADMIN — HEPARIN 500 UNITS: 100 SYRINGE at 15:32

## 2020-05-21 RX ADMIN — Medication 30 ML: at 14:24

## 2020-05-21 NOTE — PROGRESS NOTES
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 Right tonsillar diffuse large B-cell lymphoma diagnosed 2018.  • This  female who claims to have developed a sore throat in 2018. She saw her primary care provider, Tran Vaz N.P. and was prescribed antibiotic. She did have palpable lymph nodes and a CT scan of the soft tissue neck was performed on 10/11/18 revealing asymmetric enlargement of the right palatine tonsil. There was right jugular digastric and posterior triangle adenopathy. The largest lymph node within the posterior triangle measured approximately 3.6 x 3.2 x 2.0 cm. She was referred to Sly Rosario M.D. and was seen by him in initial consultation on 18 where laryngoscopy revealed right tonsillar mass. FNA was performed on in on 10/31/18 with specimen sent for pathology, but routine staining was not performed. Flow cytometry revealed a CD10 positive monoclonal B-cell population which by light scatter analysis fell into both the small cell and large cell regions, though predominantly into the large cell region. FISH analysis for BCL2, BCL6 and MYC were negative. Though Dr. Rosario had requested routine staining of the specimen, this was not performed and Pathology had wanted a more fresh specimen, prior to which she was referred to me for consultation. The patient today is denying any weight loss. She does complain of low-grade fevers controlled with Aspirin and claims to have had night sweats on and off for 10 years.   • 18 - [facility] cell non-Hodgkin's lymphoma of the right tonsil on referral by  Sly Rosario M.D. WBC 10.9 with 69% neutrophils, 23% lymphocytes, 6% monocytes, hemoglobin 13.8, platelet count 408,000.  ().  Globulin 4.1 (2.5-3.8), uric acid 5.0 (2.6-8.0), ESR 61 (0-30).      • 11/16/18 - Bone marrow aspiration and biopsy with normocellular marrow with maturing trilineage hematopoiesis. Immunohistochemistry had no increase in CD34 positive blasts or CD20 positive B-lymphocytes. Flow cytometry had no evidence of an abnormal cell type. Cytogenetics revealed 48 XX normal female karyotype. Echocardiogram with mild mitral and mild tricuspid regurgitation, moderate concentric left ventricular hypertrophy and LV systolic function normal with EF about 65-70%.   • 11/17/18 - Echocardiogram with mild mitral and tricuspid regurgitation, moderate concentric left ventricular hypertrophy, LV systolic function normal with EF about 65-70%.   • 11/19/18 - Chest x-ray with clear lungs.   • 11/20/18 - PET scan with extensive right neck lymphadenopathy beginning at the right tonsillar pillar and continuing to the supraclavicular region with SUV between 26 and 16 with extremely hypermetabolic lymph nodes, changes of cholelithiasis and old healed granulomatous disease. Mass of the nodes at L2 are approximately 6.5 x 4.2 cm at the level 3 adenopathy is also seen over an area of 5.5 x 2.8 submandibular gland and sternocleidomastoid muscle are poorly-delineated in the adenopathy. Supraclavicular lymph node is seen measuring over 24 mm. No hypermetabolic activity in the abdomen. Mild infrarenal abdominal aortic aneurysm measuring 28 mm.   • 11/29/18 - Patient underwent Infusaport placement by Ashish Mcginnis M.D.   • 11/30/18 - WBC 8.5, hemoglobin 12.7, platelet count 358,000. Discussed workup results. Patient started on Allopurinol 300 mg p.o. daily with repeat tonsillar biopsy planned for histology. SPEP with hypoalbuminemia. Hepatitis B core antibody surface antigen, hepatis C antibody all  non-reactive.   • 12/19/18 - Patient underwent ultrasound-guided needle biopsy of right neck mass by Ashish Funk M.D. with pathology revealing diffuse large B-cell lymphoma germinal center type. Flow cytometry revealed CD10 positive B-cell lymphoma. The lymphoma was CD20 and surface kappa and lambda chain positive.   • 12/27/18 - Chemotherapy orders written for R-CHOP. Rituximab 375 mg/M2 IV day 1, Cyclophosphamide 750 mg/M2 IV day 1, Doxorubicin 50 mg/M2 IV day 1, Vincristine 1.4 mg/M2 IV day 1 with a max of 2 mg dose and Prednisone 100 mg p.o. days 3-8 to cycle every 21 days for about six cycles in a curative intent. Neulasta Onpro also ordered.    • 1/3/19 - Discussed results of workup and chemotherapy.   • 1/10/19 - Cycle 1 chemotherapy given.   • 1/17/19 - WBC 2.1 with 28% neutrophils, 49% lymphocytes, 8% monocytes, hemoglobin 12.9, platelet count 234,000.   • 1/22/19 - Patient tolerating chemotherapy well. Sed rate 49 (0-30).  ESR 49 (H).   • 1/31/19 - Cycle 2 R-CHOP initiated with Neulasta support. Sed rate 81 (0-30).  ESR 81 (H). Uric acid 2.4 (L). Sodium 133 (L).   • 2/7/19 - Labs at [facility] with verbal report of WBC 1.5 and ANC 0.41. Cipro 500 mg p.o. b.i.d. x7 days as prophylaxis prescribed.   • 2/12/19 - Patient tolerating chemotherapy overall well to date with some expected taste changes and constipation. Patient advised she may use MiraLAX p.r.n. and patient advised to play with diet as well use of sour candies or mints to stimulate the taste buds. A brief period of neutropenia without febrile neutropenia noted during the last cycle with Neulasta given. Allopurinol discontinued.   • 2/21/19 - Ferritin 121 (N), iron 18 (L),  (N), iron saturation 6% (L). Prescribed Ferrous Sulfate 325 mg daily. Received cycle 3 day 1 R-CHOP. Port will not draw. Activase utilized and started on Coumadin 1 mg p.o. daily.   • 2/28/19 - Stool heme negative x3. CT neck and chest with contrast showed  previously-identified mass centered with right palatine tonsils appears to have resolved. Previously-identified right cervical lymphadenopathy has resolved. No evidence of lymphadenopathy within the chest. 4 mm left upper lobe nodule is stable from prior PET CT.  This is indeterminate. Recommend attention to follow up. Echocardiogram showed aortic valve sclerosis, EF 65-70%. No pericardial effusion.   • 3/6/19 - Discussed with the patient the option of either continuing with chemotherapy for a total of six cycles versus stopping chemotherapy and proceeding with radiation to her site of disease. Patient would like to complete planned chemotherapy course.   • 3/6/19 - PT 11.8, INR 1.0.  Patient continued on Coumadin 1 mg p.o. daily for port malfunction.  ESR 64, high.    • 3/14/19 - Cycle 4 chemotherapy given.   • 4/4/19 - Cycle 5 chemotherapy given.   • 4/16/19 - Patient complains of upper respiratory symptoms which are improving on Cipro.   • 4/25/19 - Cycle 6 chemotherapy given. Urinalysis showed trace heme.   • 5/5/19 - . Prescribed Ciprofloxacin 500 mg p.o. b.i.d. x7 days.   • 5/8/19 - Echocardiogram 60-65% ejection fraction with mild mitral and tricuspid regurgitation. CT soft tissue neck with no acute process or adenopathy seen. CT chest, abdomen and pelvis showed no acute cardiopulmonary process, no suspicious lymphadenopathy. Previously described 4 mm nodule in the left upper lobe is likely an intrafissural lymph node. No acute intraabdominal or intrapelvic process. No suspicious lymphadenopathy. Atherosclerosis with infrarenal abdominal aortic ectasia/aneurysm with mural thrombus formation. Coumadin increased to 5 mg p.o. Q.PM.  • 8/8/2019 Coumadin dose decreased to 1 mg p.o. daily for Aarugf-a-Hbmg maintenance. Sed rate 61 (0-30).  ().  • 11/1/19 - CT soft tissue neck showed no evidence of recurrent or metastatic disease in the neck in this patient with a history of lymphoma. No acute  findings. CT chest abdomen and pelvis showed No convincing evidence of recurrent or metastatic disease in the chest, abdomen or pelvis. Stable 3.2 cm fusiform infrarenal abdominal aortic aneurysm with 2% luminal stenosis secondary to atherosclerotic plaquing, and irregular ulcerated plaquing. Stable 4 mm noncalcified left upper lobe pulmonary nodule since 11/20/2018. Benign etiology is favored. This may represent a noncalcified granuloma, given the extensive granulomatous changes elsewhere within the chest.  Uncomplicated cholelithiasis.  2.   Iron deficiency anemia diagnosed February 2018.   • 1/31/19 - Hemoglobin 11.9, MCV 87.9 and RDW 14.9.   • 2/21/19 - Ferritin 121 (N), iron 18 (L),  (N), iron saturation 6 (L). Prescribed Ferrous Sulfate 325 mg one tablet p.o. daily.   • 2/28/19 - Stool heme negative x3.   • 3/26/19 - Patient reports she is not taking ferrous sulfate daily as prescribed.  She chose to increase her intake of red meat.  Instructed to start taking ferrous sulfate 325 mg p.o. daily.  Explained rationale.   • 5/14/19 - 160 (). Iron 17 (). TIBC 283 (228-428). Iron saturation 6 (15-50).  Ferritin 160.  • 8/8/2019 hemoglobin 13.2, MCV 82. Ferritin 72 ().   3.   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   • 5/9/19 - Prescribed Coumadin 5 mg p.o. daily. Referred to Dr. Duckworth for evaluation of infrarenal aortic aneurysm and mural thrombus seen on CT abdomen and pelvis.   • 5/14/19 - INR 1.4. Increased Coumadin to 6 mg p.o. daily. Follow INR protocol. INR’s to be done at [facility]. Prescription given.  • 8/8/2019 patient claims to have been seen by Dr. Duckworth and advised to stop warfarin.    Past Medical History:   Diagnosis Date   • Hypertension 1998       No past surgical history on file.      Current Outpatient Medications:   •  B Complex Vitamins (VITAMIN B COMPLEX 100 IJ), Inject  as directed., Disp: , Rfl:   •  B Complex Vitamins  (VITAMIN B COMPLEX) capsule capsule, Take  by mouth Daily., Disp: , Rfl:   •  Calcium-Magnesium-Vitamin D (CALCIUM 500 PO), Take  by mouth Daily., Disp: , Rfl:   •  Cholecalciferol (VITAMIN D) 2000 units capsule, Take  by mouth Daily., Disp: , Rfl:   •  hydrochlorothiazide (HYDRODIURIL) 25 MG tablet, Take 25 mg by mouth Daily., Disp: , Rfl:   •  lisinopril (PRINIVIL,ZESTRIL) 10 MG tablet, Take 10 mg by mouth Daily., Disp: , Rfl:   •  metoprolol tartrate (LOPRESSOR) 50 MG tablet, Take 50 mg by mouth Daily., Disp: , Rfl:   •  Potassium 99 MG tablet, Take  by mouth Daily., Disp: , Rfl:   •  warfarin (COUMADIN) 1 MG tablet, TAKE 1 TABLET BY MOUTH EVERY DAY, Disp: 30 tablet, Rfl: 3  •  Loratadine (CLARITIN) 10 MG capsule, Take  by mouth Daily., Disp: , Rfl:   No current facility-administered medications for this visit.     Facility-Administered Medications Ordered in Other Visits:   •  heparin injection 500 Units, 500 Units, Intravenous, PRN, Sanjay Larkin MD, 500 Units at 05/21/20 1532  •  sodium chloride 0.9 % flush 10 mL, 10 mL, Intravenous, PRN, Sanjay Larkin MD, 30 mL at 05/21/20 1424    No Known Allergies    Family History   Problem Relation Age of Onset   • Kidney cancer Father 66   • Leukemia Brother 48   • Multiple myeloma Brother 47       Cancer-related family history includes Kidney cancer (age of onset: 66) in her father.    Social History     Tobacco Use   • Smoking status: Current Every Day Smoker     Packs/day: 0.50     Years: 10.00     Pack years: 5.00     Types: Cigarettes   • Smokeless tobacco: Never Used   • Tobacco comment: started smoking when she was in her 20’s   Substance Use Topics   • Alcohol use: No     Frequency: Never   • Drug use: No       I have reviewed the history of present illness, past medical history, family history, social history, lab results, all notes and other records since the patient was last seen on 8/8/19.     SUBJECTIVE:   Patient is here for follow up of right  "tonsillar diffuse large B-cell lymphoma.  She does not report any fevers chills night sweats weight loss or lymph node swelling.  She has not had any recent mammogram.  She denies feeling any breast masses.  Discussed her CT scan results which shows a nodule in her right breast.  She is not taking any iron supplement at this time.  She continues to smoke..        REVIEW OF SYSTEMS:  Review of Systems   Constitutional: Negative for chills and fever.   HENT: Negative for ear pain, mouth sores, nosebleeds and sore throat.    Eyes: Negative for photophobia and visual disturbance.   Respiratory: Negative for wheezing and stridor.    Cardiovascular: Negative for chest pain and palpitations.   Gastrointestinal: Negative for abdominal pain, diarrhea, nausea and vomiting.   Endocrine: Negative for cold intolerance and heat intolerance.   Genitourinary: Negative for dysuria and hematuria.   Musculoskeletal: Negative for joint swelling and neck stiffness.   Skin: Negative for color change and rash.   Neurological: Negative for seizures and syncope.   Hematological: Negative for adenopathy.        No obvious bleeding   Psychiatric/Behavioral: Negative for agitation, confusion and hallucinations.       OBJECTIVE:    Vitals:    05/21/20 1441   BP: 159/100   Pulse: 73   Resp: 18   Temp: 97.3 °F (36.3 °C)   Weight: 72.9 kg (160 lb 12.8 oz)   Height: 163.8 cm (64.5\")   PainSc: 0-No pain       ECOG  (0) Fully active, able to carry on all predisease performance without restriction    Physical Exam   Constitutional: She is oriented to person, place, and time. No distress.   HENT:   Head: Normocephalic and atraumatic.   Eye glasses present. Dental fillings.    Eyes: Conjunctivae and EOM are normal. Right eye exhibits no discharge. Left eye exhibits no discharge. No scleral icterus.   Neck: Normal range of motion. Neck supple. No thyromegaly present.   Cardiovascular: Normal rate, regular rhythm and normal heart sounds. Exam reveals no " gallop and no friction rub.   Pulmonary/Chest: Effort normal. No stridor. No respiratory distress. She has no wheezes.   Left chest wall port..  No palpable mass on breast exam   Abdominal: Soft. Bowel sounds are normal. She exhibits no mass. There is no tenderness. There is no rebound and no guarding.   Musculoskeletal: Normal range of motion. She exhibits no tenderness.   Degenerative changes.    Lymphadenopathy:     She has no cervical adenopathy.   Neurological: She is alert and oriented to person, place, and time. She exhibits normal muscle tone.   Skin: Skin is warm. No rash noted. She is not diaphoretic. No erythema.   Psychiatric: She has a normal mood and affect. Her behavior is normal.   Nursing note and vitals reviewed.      RECENT LABS  WBC   Date Value Ref Range Status   05/21/2020 8.99 3.40 - 10.80 10*3/mm3 Final     RBC   Date Value Ref Range Status   05/21/2020 4.77 3.77 - 5.28 10*6/mm3 Final     Hemoglobin   Date Value Ref Range Status   05/21/2020 14.6 12.0 - 15.9 g/dL Final     Hematocrit   Date Value Ref Range Status   05/21/2020 43.2 34.0 - 46.6 % Final     MCV   Date Value Ref Range Status   05/21/2020 90.6 79.0 - 97.0 fL Final     MCH   Date Value Ref Range Status   05/21/2020 30.6 26.6 - 33.0 pg Final     MCHC   Date Value Ref Range Status   05/21/2020 33.8 31.5 - 35.7 g/dL Final     RDW   Date Value Ref Range Status   05/21/2020 14.0 12.3 - 15.4 % Final     RDW-SD   Date Value Ref Range Status   05/21/2020 45.6 37.0 - 54.0 fl Final     MPV   Date Value Ref Range Status   05/21/2020 10.1 6.0 - 12.0 fL Final     Platelets   Date Value Ref Range Status   05/21/2020 341 140 - 450 10*3/mm3 Final     Neutrophil %   Date Value Ref Range Status   05/21/2020 67.6 42.7 - 76.0 % Final     Lymphocyte %   Date Value Ref Range Status   05/21/2020 19.7 19.6 - 45.3 % Final     Monocyte %   Date Value Ref Range Status   05/21/2020 8.8 5.0 - 12.0 % Final     Eosinophil %   Date Value Ref Range Status    05/21/2020 3.7 0.3 - 6.2 % Final     Basophil %   Date Value Ref Range Status   05/21/2020 0.2 0.0 - 1.5 % Final     Neutrophils, Absolute   Date Value Ref Range Status   05/21/2020 6.08 1.70 - 7.00 10*3/mm3 Final     Lymphocytes, Absolute   Date Value Ref Range Status   05/21/2020 1.77 0.70 - 3.10 10*3/mm3 Final     Monocytes, Absolute   Date Value Ref Range Status   05/21/2020 0.79 0.10 - 0.90 10*3/mm3 Final     Eosinophils, Absolute   Date Value Ref Range Status   05/21/2020 0.33 0.00 - 0.40 10*3/mm3 Final     Basophils, Absolute   Date Value Ref Range Status   05/21/2020 0.02 0.00 - 0.20 10*3/mm3 Final     nRBC   Date Value Ref Range Status   11/19/2018 0 0 /100[WBCs] Final       Lab Results   Component Value Date    GLUCOSE 81 11/14/2019    BUN 10 11/14/2019    CREATININE 0.70 05/12/2020    EGFRIFNONA 110 11/14/2019    BCR 17.9 11/14/2019    K 3.4 (L) 11/14/2019    CO2 28.0 11/14/2019    CALCIUM 9.5 11/14/2019    ALBUMIN 4.00 11/14/2019    LABIL2 0.8 (L) 04/25/2019    AST 22 11/14/2019    ALT 21 11/14/2019         Assessment/Plan     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 IPI 1 low  - CBC & Differential  - Comprehensive Metabolic Panel  - Lactate Dehydrogenase  - Mammo Diagnostic Digital Tomosynthesis Bilateral With CAD  - Iron Profile  - Ferritin    Iron deficiency anemia due to chronic blood loss  - CBC & Differential  - Comprehensive Metabolic Panel  - Lactate Dehydrogenase  - Mammo Diagnostic Digital Tomosynthesis Bilateral With CAD  - Iron Profile  - Ferritin    Breast nodule  - CBC & Differential  - Comprehensive Metabolic Panel  - Lactate Dehydrogenase  - Mammo Diagnostic Digital Tomosynthesis Bilateral With CAD  - Iron Profile  - Ferritin      ASSESSMENT:  Discussed patient's last CT scans that were negative and plans to repeat CT scans in May.  Asked patient to follow up with Dr. Rosario for surveillance exam.    1. Diffuse large B-cell lymphoma of lymph nodes of neck (CMS/HCC): Status post 6  cycles of R-CHOP  finished 4/25/2019: Restaging CT scan 5/12/2020 does not show any evidence of lymphadenopathy in the neck chest.  2. Encounter for care related to vascular access port  3. Iron deficiency anemia due to chronic blood loss  4. Smoking  5. Aortic mural thrombus (CMS/HCC  6. Right breast nodule seen on recent CT scan.  No palpable abnormality.        PLAN:  Patient seen for the first time.  Entire chart reviewed.  We will arrange bilateral 3D screening mammogram through [facility].  Patient does not want to go to the hospital during the current pandemic.  Recommended monthly breast exams..  Recommended regular screening.  Follow ENT-Dr. Rosario for routine surveillance  Will check ferritin iron panel today.    Check CBC CMP LDH today.  Smoking cessation counseling provided.  Patient not interested in using any patches at this time, but is willing to consider at the next visit.  May change Port-A-Cath flush to every 6 weeks.  Will consider removal of port if the neck CT scan is negative for malignancy.  We will follow patient back in 3 months.               I counselled Sarah of the risks of continuing to use tobacco and cessation.    During this visit, I spent 3-10 minutes counseling the patient regarding tobacco cessation.   Patient verbalized understanding and is in agreement of the above plan.    I have spent 45 minutes on this case which includes review of records, treatment formulation and more than 50% of the time spent on face-to-face counseling.  Breast exam also done.     Thank you very much for providing the opportunity to participate in this patient’s care. Please do not hesitate to call if there are any other questions.    I have reviewed all relevant labs results,outside reports, imaging,,notes, vitals, medications and plan with the patient today.    Portions of the note have been Scribed by medical assistant/Nurse.  I have reviewed and made changes accordingly.          Electronically  signed by Madan Lee MD, 05/21/20, 2:19 PM.

## 2020-06-23 ENCOUNTER — HOSPITAL ENCOUNTER (OUTPATIENT)
Dept: ONCOLOGY | Facility: HOSPITAL | Age: 65
Setting detail: INFUSION SERIES
Discharge: HOME | End: 2020-06-23

## 2020-06-23 VITALS
WEIGHT: 158 LBS | SYSTOLIC BLOOD PRESSURE: 159 MMHG | DIASTOLIC BLOOD PRESSURE: 88 MMHG | HEART RATE: 74 BPM | RESPIRATION RATE: 18 BRPM | TEMPERATURE: 99.5 F | BODY MASS INDEX: 26.98 KG/M2 | HEIGHT: 64 IN

## 2020-06-23 DIAGNOSIS — Z45.2 ENCOUNTER FOR FITTING AND ADJUSTMENT OF VASCULAR CATHETER: Primary | ICD-10-CM

## 2020-06-23 DIAGNOSIS — C83.31 DIFFUSE LARGE B-CELL LYMPHOMA OF LYMPH NODES OF NECK (HCC): ICD-10-CM

## 2020-06-23 PROCEDURE — 96523 IRRIG DRUG DELIVERY DEVICE: CPT

## 2020-06-23 PROCEDURE — 25010000003 HEPARIN LOCK FLUSH PER 10 UNITS: Performed by: INTERNAL MEDICINE

## 2020-06-23 RX ORDER — HEPARIN SODIUM (PORCINE) LOCK FLUSH IV SOLN 100 UNIT/ML 100 UNIT/ML
500 SOLUTION INTRAVENOUS AS NEEDED
Status: CANCELLED | OUTPATIENT
Start: 2020-06-23

## 2020-06-23 RX ORDER — SODIUM CHLORIDE 0.9 % (FLUSH) 0.9 %
20 SYRINGE (ML) INJECTION AS NEEDED
Status: DISCONTINUED | OUTPATIENT
Start: 2020-06-23 | End: 2020-06-24 | Stop reason: HOSPADM

## 2020-06-23 RX ORDER — HEPARIN SODIUM (PORCINE) LOCK FLUSH IV SOLN 100 UNIT/ML 100 UNIT/ML
500 SOLUTION INTRAVENOUS AS NEEDED
Status: DISCONTINUED | OUTPATIENT
Start: 2020-06-23 | End: 2020-06-24 | Stop reason: HOSPADM

## 2020-06-23 RX ORDER — SODIUM CHLORIDE 0.9 % (FLUSH) 0.9 %
20 SYRINGE (ML) INJECTION AS NEEDED
Status: CANCELLED | OUTPATIENT
Start: 2020-06-23

## 2020-06-23 RX ADMIN — Medication 30 ML: at 14:09

## 2020-06-23 RX ADMIN — HEPARIN 500 UNITS: 100 SYRINGE at 14:10

## 2020-07-22 ENCOUNTER — TELEPHONE (OUTPATIENT)
Dept: ONCOLOGY | Facility: CLINIC | Age: 65
End: 2020-07-22

## 2020-07-22 NOTE — TELEPHONE ENCOUNTER
Spoke with [facility].  Patient scheduled for mammogram at 3:00PM on 8-3-2020.  Attempted to contact patient but had to LMV asking her to call back.  Records faxed.

## 2020-07-24 ENCOUNTER — TELEPHONE (OUTPATIENT)
Dept: ONCOLOGY | Facility: CLINIC | Age: 65
End: 2020-07-24

## 2020-07-24 NOTE — TELEPHONE ENCOUNTER
Informed patient she is scheduled for mammogram, at [facility], on 8-3-2020 at 2:45.  She v/u.

## 2020-08-04 ENCOUNTER — HOSPITAL ENCOUNTER (OUTPATIENT)
Dept: ONCOLOGY | Facility: HOSPITAL | Age: 65
Setting detail: INFUSION SERIES
Discharge: HOME | End: 2020-08-04

## 2020-08-04 VITALS — BODY MASS INDEX: 27.02 KG/M2 | HEIGHT: 65 IN | WEIGHT: 162.2 LBS

## 2020-08-04 DIAGNOSIS — Z45.2 ENCOUNTER FOR FITTING AND ADJUSTMENT OF VASCULAR CATHETER: Primary | ICD-10-CM

## 2020-08-04 DIAGNOSIS — C83.31 DIFFUSE LARGE B-CELL LYMPHOMA OF LYMPH NODES OF NECK (HCC): ICD-10-CM

## 2020-08-04 PROCEDURE — 25010000003 HEPARIN LOCK FLUSH PER 10 UNITS: Performed by: INTERNAL MEDICINE

## 2020-08-04 PROCEDURE — 96523 IRRIG DRUG DELIVERY DEVICE: CPT

## 2020-08-04 RX ORDER — HEPARIN SODIUM (PORCINE) LOCK FLUSH IV SOLN 100 UNIT/ML 100 UNIT/ML
500 SOLUTION INTRAVENOUS AS NEEDED
Status: CANCELLED | OUTPATIENT
Start: 2020-08-04

## 2020-08-04 RX ORDER — HEPARIN SODIUM (PORCINE) LOCK FLUSH IV SOLN 100 UNIT/ML 100 UNIT/ML
500 SOLUTION INTRAVENOUS AS NEEDED
Status: DISCONTINUED | OUTPATIENT
Start: 2020-08-04 | End: 2020-08-05 | Stop reason: HOSPADM

## 2020-08-04 RX ORDER — SODIUM CHLORIDE 0.9 % (FLUSH) 0.9 %
20 SYRINGE (ML) INJECTION AS NEEDED
Status: DISCONTINUED | OUTPATIENT
Start: 2020-08-04 | End: 2020-08-05 | Stop reason: HOSPADM

## 2020-08-04 RX ORDER — SODIUM CHLORIDE 0.9 % (FLUSH) 0.9 %
20 SYRINGE (ML) INJECTION AS NEEDED
Status: CANCELLED | OUTPATIENT
Start: 2020-08-04

## 2020-08-04 RX ADMIN — HEPARIN 500 UNITS: 100 SYRINGE at 14:10

## 2020-08-04 RX ADMIN — Medication 30 ML: at 14:07

## 2020-08-13 ENCOUNTER — APPOINTMENT (OUTPATIENT)
Dept: VASCULAR SURGERY | Facility: HOSPITAL | Age: 65
End: 2020-08-13

## 2020-08-13 ENCOUNTER — APPOINTMENT (OUTPATIENT)
Dept: CARDIOLOGY | Facility: HOSPITAL | Age: 65
End: 2020-08-13

## 2020-08-20 ENCOUNTER — APPOINTMENT (OUTPATIENT)
Dept: LAB | Facility: HOSPITAL | Age: 65
End: 2020-08-20

## 2020-08-20 ENCOUNTER — OFFICE VISIT (OUTPATIENT)
Dept: ONCOLOGY | Facility: CLINIC | Age: 65
End: 2020-08-20

## 2020-08-20 VITALS
HEART RATE: 88 BPM | SYSTOLIC BLOOD PRESSURE: 164 MMHG | BODY MASS INDEX: 26.96 KG/M2 | TEMPERATURE: 97.7 F | RESPIRATION RATE: 16 BRPM | WEIGHT: 161.8 LBS | HEIGHT: 65 IN | DIASTOLIC BLOOD PRESSURE: 96 MMHG

## 2020-08-20 DIAGNOSIS — D50.0 IRON DEFICIENCY ANEMIA DUE TO CHRONIC BLOOD LOSS: ICD-10-CM

## 2020-08-20 DIAGNOSIS — C83.31 DIFFUSE LARGE B-CELL LYMPHOMA OF LYMPH NODES OF NECK (HCC): Primary | ICD-10-CM

## 2020-08-20 PROCEDURE — 36415 COLL VENOUS BLD VENIPUNCTURE: CPT | Performed by: INTERNAL MEDICINE

## 2020-08-20 PROCEDURE — 99214 OFFICE O/P EST MOD 30 MIN: CPT | Performed by: INTERNAL MEDICINE

## 2020-08-20 RX ORDER — AMLODIPINE BESYLATE 2.5 MG/1
TABLET ORAL
COMMUNITY
Start: 2020-06-17

## 2020-08-20 NOTE — PROGRESS NOTES
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 Right tonsillar diffuse large B-cell lymphoma diagnosed 2018.  • This  female who claims to have developed a sore throat in 2018. She saw her primary care provider, Tran Vaz N.P. and was prescribed antibiotic. She did have palpable lymph nodes and a CT scan of the soft tissue neck was performed on 10/11/18 revealing asymmetric enlargement of the right palatine tonsil. There was right jugular digastric and posterior triangle adenopathy. The largest lymph node within the posterior triangle measured approximately 3.6 x 3.2 x 2.0 cm. She was referred to Sly Rosario M.D. and was seen by him in initial consultation on 18 where laryngoscopy revealed right tonsillar mass. FNA was performed on in on 10/31/18 with specimen sent for pathology, but routine staining was not performed. Flow cytometry revealed a CD10 positive monoclonal B-cell population which by light scatter analysis fell into both the small cell and large cell regions, though predominantly into the large cell region. FISH analysis for BCL2, BCL6 and MYC were negative. Though Dr. Rosario had requested routine staining of the specimen, this was not performed and Pathology had wanted a more fresh specimen, prior to which she was referred to me for consultation. The patient today is denying any weight loss. She does complain of low-grade fevers controlled with Aspirin and claims to have had night sweats on and off for 10 years.   • 18 - [facility] cell non-Hodgkin's lymphoma of the right tonsil on referral by  Sly Rosario M.D. WBC 10.9 with 69% neutrophils, 23% lymphocytes, 6% monocytes, hemoglobin 13.8, platelet count 408,000.  ().  Globulin 4.1 (2.5-3.8), uric acid 5.0 (2.6-8.0), ESR 61 (0-30).      • 11/16/18 - Bone marrow aspiration and biopsy with normocellular marrow with maturing trilineage hematopoiesis. Immunohistochemistry had no increase in CD34 positive blasts or CD20 positive B-lymphocytes. Flow cytometry had no evidence of an abnormal cell type. Cytogenetics revealed 48 XX normal female karyotype. Echocardiogram with mild mitral and mild tricuspid regurgitation, moderate concentric left ventricular hypertrophy and LV systolic function normal with EF about 65-70%.   • 11/17/18 - Echocardiogram with mild mitral and tricuspid regurgitation, moderate concentric left ventricular hypertrophy, LV systolic function normal with EF about 65-70%.   • 11/19/18 - Chest x-ray with clear lungs.   • 11/20/18 - PET scan with extensive right neck lymphadenopathy beginning at the right tonsillar pillar and continuing to the supraclavicular region with SUV between 26 and 16 with extremely hypermetabolic lymph nodes, changes of cholelithiasis and old healed granulomatous disease. Mass of the nodes at L2 are approximately 6.5 x 4.2 cm at the level 3 adenopathy is also seen over an area of 5.5 x 2.8 submandibular gland and sternocleidomastoid muscle are poorly-delineated in the adenopathy. Supraclavicular lymph node is seen measuring over 24 mm. No hypermetabolic activity in the abdomen. Mild infrarenal abdominal aortic aneurysm measuring 28 mm.   • 11/29/18 - Patient underwent Infusaport placement by Ashish Mcginnis M.D.   • 11/30/18 - WBC 8.5, hemoglobin 12.7, platelet count 358,000. Discussed workup results. Patient started on Allopurinol 300 mg p.o. daily with repeat tonsillar biopsy planned for histology. SPEP with hypoalbuminemia. Hepatitis B core antibody surface antigen, hepatis C antibody all  non-reactive.   • 12/19/18 - Patient underwent ultrasound-guided needle biopsy of right neck mass by Ashish Funk M.D. with pathology revealing diffuse large B-cell lymphoma germinal center type. Flow cytometry revealed CD10 positive B-cell lymphoma. The lymphoma was CD20 and surface kappa and lambda chain positive.   • 12/27/18 - Chemotherapy orders written for R-CHOP. Rituximab 375 mg/M2 IV day 1, Cyclophosphamide 750 mg/M2 IV day 1, Doxorubicin 50 mg/M2 IV day 1, Vincristine 1.4 mg/M2 IV day 1 with a max of 2 mg dose and Prednisone 100 mg p.o. days 3-8 to cycle every 21 days for about six cycles in a curative intent. Neulasta Onpro also ordered.    • 1/3/19 - Discussed results of workup and chemotherapy.   • 1/10/19 - Cycle 1 chemotherapy given.   • 1/17/19 - WBC 2.1 with 28% neutrophils, 49% lymphocytes, 8% monocytes, hemoglobin 12.9, platelet count 234,000.   • 1/22/19 - Patient tolerating chemotherapy well. Sed rate 49 (0-30).  ESR 49 (H).   • 1/31/19 - Cycle 2 R-CHOP initiated with Neulasta support. Sed rate 81 (0-30).  ESR 81 (H). Uric acid 2.4 (L). Sodium 133 (L).   • 2/7/19 - Labs at [facility] with verbal report of WBC 1.5 and ANC 0.41. Cipro 500 mg p.o. b.i.d. x7 days as prophylaxis prescribed.   • 2/12/19 - Patient tolerating chemotherapy overall well to date with some expected taste changes and constipation. Patient advised she may use MiraLAX p.r.n. and patient advised to play with diet as well use of sour candies or mints to stimulate the taste buds. A brief period of neutropenia without febrile neutropenia noted during the last cycle with Neulasta given. Allopurinol discontinued.   • 2/21/19 - Ferritin 121 (N), iron 18 (L),  (N), iron saturation 6% (L). Prescribed Ferrous Sulfate 325 mg daily. Received cycle 3 day 1 R-CHOP. Port will not draw. Activase utilized and started on Coumadin 1 mg p.o. daily.   • 2/28/19 - Stool heme negative x3. CT neck and chest with contrast showed  previously-identified mass centered with right palatine tonsils appears to have resolved. Previously-identified right cervical lymphadenopathy has resolved. No evidence of lymphadenopathy within the chest. 4 mm left upper lobe nodule is stable from prior PET CT.  This is indeterminate. Recommend attention to follow up. Echocardiogram showed aortic valve sclerosis, EF 65-70%. No pericardial effusion.   • 3/6/19 - Discussed with the patient the option of either continuing with chemotherapy for a total of six cycles versus stopping chemotherapy and proceeding with radiation to her site of disease. Patient would like to complete planned chemotherapy course.   • 3/6/19 - PT 11.8, INR 1.0.  Patient continued on Coumadin 1 mg p.o. daily for port malfunction.  ESR 64, high.    • 3/14/19 - Cycle 4 chemotherapy given.   • 4/4/19 - Cycle 5 chemotherapy given.   • 4/16/19 - Patient complains of upper respiratory symptoms which are improving on Cipro.   • 4/25/19 - Cycle 6 chemotherapy given. Urinalysis showed trace heme.   • 5/5/19 - . Prescribed Ciprofloxacin 500 mg p.o. b.i.d. x7 days.   • 5/8/19 - Echocardiogram 60-65% ejection fraction with mild mitral and tricuspid regurgitation. CT soft tissue neck with no acute process or adenopathy seen. CT chest, abdomen and pelvis showed no acute cardiopulmonary process, no suspicious lymphadenopathy. Previously described 4 mm nodule in the left upper lobe is likely an intrafissural lymph node. No acute intraabdominal or intrapelvic process. No suspicious lymphadenopathy. Atherosclerosis with infrarenal abdominal aortic ectasia/aneurysm with mural thrombus formation. Coumadin increased to 5 mg p.o. Q.PM.  • 8/8/2019 Coumadin dose decreased to 1 mg p.o. daily for [id number] maintenance. Sed rate 61 (0-30).  ().  • 11/1/19 - CT soft tissue neck showed no evidence of recurrent or metastatic disease in the neck in this patient with a history of lymphoma. No acute  findings. CT chest abdomen and pelvis showed No convincing evidence of recurrent or metastatic disease in the chest, abdomen or pelvis. Stable 3.2 cm fusiform infrarenal abdominal aortic aneurysm with 2% luminal stenosis secondary to atherosclerotic plaquing, and irregular ulcerated plaquing. Stable 4 mm noncalcified left upper lobe pulmonary nodule since 11/20/2018. Benign etiology is favored. This may represent a noncalcified granuloma, given the extensive granulomatous changes elsewhere within the chest.  Uncomplicated cholelithiasis.  • 5/12/2020: CT soft tissue neck with contrast, CT chest with contrast: No evidence of lymphadenopathy within the neck or chest.  Stable small nodule in the right breast likely benign.  Recommend correlation with mammography.  • 8/3/2020: Bilateral 3D mammogram: BI-RADS 2 benign.  No evidence of malignancy.  Will circumscribed masses within both breasts, more numerous in the left breast, appear relatively stable from prior mammograms from 2014.  2.   Iron deficiency anemia diagnosed February 2018.   • 1/31/19 - Hemoglobin 11.9, MCV 87.9 and RDW 14.9.   • 2/21/19 - Ferritin 121 (N), iron 18 (L),  (N), iron saturation 6 (L). Prescribed Ferrous Sulfate 325 mg one tablet p.o. daily.   • 2/28/19 - Stool heme negative x3.   • 3/26/19 - Patient reports she is not taking ferrous sulfate daily as prescribed.  She chose to increase her intake of red meat.  Instructed to start taking ferrous sulfate 325 mg p.o. daily.  Explained rationale.   • 5/14/19 - 160 (). Iron 17 (). TIBC 283 (228-428). Iron saturation 6 (15-50).  Ferritin 160.  • 8/8/2019 hemoglobin 13.2, MCV 82. Ferritin 72 ().   • 5/21/2020: Ferritin 107.3, iron saturation 17 low, serum iron 57, TIBC 332, , creatinine 0.55, LFTs normal, WBC 8.9, hemoglobin 14.6, platelets 341, MCV 90.6,  3.   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   • 5/9/19 - Prescribed Coumadin 5 mg p.o.  daily. Referred to Dr. Duckworth for evaluation of infrarenal aortic aneurysm and mural thrombus seen on CT abdomen and pelvis.   • 5/14/19 - INR 1.4. Increased Coumadin to 6 mg p.o. daily. Follow INR protocol. [facility] to be done at [facility]. Prescription given.  • 8/8/2019 patient claims to have been seen by Dr. Duckworth and advised to stop warfarin.    Past Medical History:   Diagnosis Date   • Hypertension 1998     History reviewed. No pertinent surgical history.      Current Outpatient Medications:   •  amLODIPine (NORVASC) 2.5 MG tablet, , Disp: , Rfl:   •  B Complex Vitamins (VITAMIN B COMPLEX 100 IJ), Inject  as directed., Disp: , Rfl:   •  B Complex Vitamins (VITAMIN B COMPLEX) capsule capsule, Take  by mouth Daily., Disp: , Rfl:   •  Calcium-Magnesium-Vitamin D (CALCIUM 500 PO), Take  by mouth Daily., Disp: , Rfl:   •  Cholecalciferol (VITAMIN D) 2000 units capsule, Take  by mouth Daily., Disp: , Rfl:   •  Potassium 99 MG tablet, Take  by mouth Daily., Disp: , Rfl:   •  warfarin (COUMADIN) 1 MG tablet, TAKE 1 TABLET BY MOUTH EVERY DAY, Disp: 30 tablet, Rfl: 3  •  hydrochlorothiazide (HYDRODIURIL) 25 MG tablet, Take 25 mg by mouth Daily., Disp: , Rfl:   •  lisinopril (PRINIVIL,ZESTRIL) 10 MG tablet, Take 10 mg by mouth Daily., Disp: , Rfl:   •  Loratadine (CLARITIN) 10 MG capsule, Take  by mouth Daily., Disp: , Rfl:   •  metoprolol tartrate (LOPRESSOR) 50 MG tablet, Take 50 mg by mouth Daily., Disp: , Rfl:     No Known Allergies    Family History   Problem Relation Age of Onset   • Kidney cancer Father 66   • Leukemia Brother 48   • Multiple myeloma Brother 47     Cancer-related family history includes Kidney cancer (age of onset: 66) in her father.    Social History     Tobacco Use   • Smoking status: Current Every Day Smoker     Packs/day: 0.50     Years: 10.00     Pack years: 5.00     Types: Cigarettes   • Smokeless tobacco: Never Used   • Tobacco comment: started smoking when she was in her  "20’s   Substance Use Topics   • Alcohol use: No     Frequency: Never   • Drug use: No     I have reviewed the history of present illness, past medical history, family history, social history, lab results, all notes and other records since the patient was last seen on 8/8/19.     SUBJECTIVE:   Patient is here for follow up of right tonsillar diffuse large B-cell lymphoma.  She denies any fevers chills night sweats weight loss or lymph node swelling.  She denies any dysphagia or any swelling in the throat. She had a recent mammogram performed at [facility] on 08/03/20. She performs monthly breast exams. She continues to smoke less than a pack a day.  Discussed moving cessation with the patient.  She is not ready to quit smoking yet.  She states her BP is high because she ran out of her medications. She is not taking any iron supplement at this time.  She continues to smoke.        REVIEW OF SYSTEMS:  Review of Systems   Constitutional: Negative for chills and fever.   HENT: Negative for ear pain, mouth sores, nosebleeds and sore throat.    Eyes: Negative for photophobia and visual disturbance.   Respiratory: Negative for wheezing and stridor.    Cardiovascular: Negative for chest pain and palpitations.   Gastrointestinal: Negative for abdominal pain, diarrhea, nausea and vomiting.   Endocrine: Negative for cold intolerance and heat intolerance.   Genitourinary: Negative for dysuria and hematuria.   Musculoskeletal: Negative for joint swelling and neck stiffness.   Skin: Negative for color change and rash.   Neurological: Negative for seizures and syncope.   Hematological: Negative for adenopathy.        No obvious bleeding   Psychiatric/Behavioral: Negative for agitation, confusion and hallucinations.       OBJECTIVE:    Vitals:    08/20/20 1406   BP: 164/96   Pulse: 88   Resp: 16   Temp: 97.7 °F (36.5 °C)   Weight: 73.4 kg (161 lb 12.8 oz)   Height: 165.1 cm (65\")   PainSc: 0-No pain     ECOG  (0) Fully " active, able to carry on all predisease performance without restriction    Physical Exam   Constitutional: She is oriented to person, place, and time. She appears well-developed and well-nourished. No distress.   HENT:   Head: Normocephalic and atraumatic.   Eye glasses present. Dental fillings.    Eyes: Conjunctivae and EOM are normal. Right eye exhibits no discharge. Left eye exhibits no discharge. No scleral icterus.   Neck: Normal range of motion. Neck supple. No thyromegaly present.   Cardiovascular: Normal rate, regular rhythm and normal heart sounds. Exam reveals no gallop and no friction rub.   Pulmonary/Chest: Effort normal. No stridor. No respiratory distress. She has no wheezes.   Left chest wall port..  No palpable mass on breast exam.  Slightly decreased air entry in the lungs.   Abdominal: Soft. Bowel sounds are normal. She exhibits no mass. There is no tenderness. There is no rebound and no guarding.   Musculoskeletal: Normal range of motion. She exhibits no tenderness.   Degenerative changes.    Lymphadenopathy:     She has no cervical adenopathy.   Neurological: She is alert and oriented to person, place, and time. She exhibits normal muscle tone.   Skin: Skin is warm. No rash noted. She is not diaphoretic. No erythema.   Psychiatric: She has a normal mood and affect. Her behavior is normal.   Nursing note and vitals reviewed.     I have reexamined the patient and the results are consistent with the previously documented exam. Madan Lee MD       RECENT LABS  Lab Results - Last 18 Months   Lab Units 05/21/20  1524 11/14/19  1550 08/08/19  1438   WBC 10*3/mm3 8.99 8.25 8.09   HEMOGLOBIN g/dL 14.6 14.2 13.2   HEMATOCRIT % 43.2 42.1 39.5   PLATELETS 10*3/mm3 341 328 335   MCV fL 90.6 85.2 82.0     Lab Results - Last 18 Months   Lab Units 05/21/20  1524 05/12/20  1354 11/14/19  1550  08/08/19  1558   SODIUM mmol/L 140  --  139  --  131*   POTASSIUM mmol/L 3.6  --  3.4*  --  3.2*   CHLORIDE mmol/L 103   --  98  --  95*   CO2 mmol/L 25.0  --  28.0  --  25.0   BUN mg/dL 6*  --  10  --  8   CREATININE mg/dL 0.55* 0.70 0.56*   < > 0.60   CALCIUM mg/dL 8.6  --  9.5  --  8.8*   BILIRUBIN mg/dL 0.2  --  0.3  --  0.5   ALK PHOS U/L 94  --  88  --  78   ALT (SGPT) U/L 17  --  21  --  20   AST (SGOT) U/L 19  --  22  --  25   GLUCOSE mg/dL 94  --  81  --  113*    < > = values in this interval not displayed.     Lab Results   Component Value Date    GLUCOSE 94 05/21/2020    BUN 6 (L) 05/21/2020    CREATININE 0.55 (L) 05/21/2020    EGFRIFNONA 112 05/21/2020    BCR 10.9 05/21/2020    K 3.6 05/21/2020    CO2 25.0 05/21/2020    CALCIUM 8.6 05/21/2020    ALBUMIN 3.60 05/21/2020    LABIL2 0.8 (L) 04/25/2019    AST 19 05/21/2020    ALT 17 05/21/2020     Lab Results   Component Value Date    IRON 57 05/21/2020    TIBC 332 05/21/2020    FERRITIN 107.30 05/21/2020     No results found for: FOLATE  No results found for: OCCULTBLD  No results found for: RETICCTPCT  No results found for: [id number], TSH  No results found for: SPEP, UPEP  LDH   Date Value Ref Range Status   05/21/2020 147 135 - 214 U/L Final     Uric Acid   Date Value Ref Range Status   01/31/2019 2.4 (L) 2.6 - 8.0 mg/dL Final     Lab Results   Component Value Date    SEDRATE 61 (H) 08/08/2019     No results found for: FIBRINOGEN, HAPTOGLOBIN, DDIMER  No results found for: DDIMER  Lab Results   Component Value Date    INR 2.30 08/06/2019     No results found for:   No results found for: CEA  No results found for:   No results found for: PSA  No results found for: [id number]    Assessment/Plan     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 Diffuse large B-cell lymphoma of lymph  nodes of neck (CMS/HCC): Status post 6 cycles of R-CHOP  finished 4/25/2019: Restaging CT scan 5/12/2020 does not show any evidence of lymphadenopathy in the neck chest.  2. Encounter for care related to vascular access port  3. Iron deficiency anemia due to chronic blood loss: Patient has recently stopped taking iron.  4. Smoking  5. Aortic mural thrombus (  6. Right breast nodule seen on recent CT scan.  No palpable abnormality.      PLAN:    · Advised to restart taking oral iron.  Advised to take 1 iron tablet daily  · Recommended CT scan neck, chest abdomen with contrast in about 3 months and follow-up.    · CBC CMP LDH in 3 months before follow-up  · Continue new monthly self breast exams.  · Smoking cessation counseling provided.  However patient not interested in quitting at this point.  · recommended monthly breast exams..  Recommended regular screening.           Thank you very much for providing the opportunity to participate in this patient’s care. Please do not hesitate to call if there are any other questions.      I have reviewed and confirmed the accuracy of the patient's history: Chief complaint, HPI, ROS and Subjective as entered by the MA/LPN/RN. Madan Lee MD 08/20/20         Electronically signed by Madan Lee MD, 08/20/20, 2:36 PM.

## 2020-09-01 ENCOUNTER — HOSPITAL ENCOUNTER (OUTPATIENT)
Dept: ONCOLOGY | Facility: HOSPITAL | Age: 65
Setting detail: INFUSION SERIES
Discharge: HOME | End: 2020-09-01

## 2020-09-01 VITALS — BODY MASS INDEX: 26.59 KG/M2 | WEIGHT: 159.6 LBS | HEIGHT: 65 IN

## 2020-09-01 DIAGNOSIS — Z45.2 ENCOUNTER FOR CARE RELATED TO VASCULAR ACCESS PORT: ICD-10-CM

## 2020-09-01 DIAGNOSIS — D61.810 ANTINEOPLASTIC CHEMOTHERAPY INDUCED PANCYTOPENIA (HCC): Primary | ICD-10-CM

## 2020-09-01 DIAGNOSIS — T45.1X5A ANTINEOPLASTIC CHEMOTHERAPY INDUCED PANCYTOPENIA (HCC): Primary | ICD-10-CM

## 2020-09-01 DIAGNOSIS — C83.31 DIFFUSE LARGE B-CELL LYMPHOMA OF LYMPH NODES OF NECK (HCC): ICD-10-CM

## 2020-09-01 DIAGNOSIS — Z45.2 ENCOUNTER FOR FITTING AND ADJUSTMENT OF VASCULAR CATHETER: ICD-10-CM

## 2020-09-01 LAB
ALBUMIN SERPL-MCNC: 3.8 G/DL (ref 3.5–5.2)
ALBUMIN/GLOB SERPL: 1.3 G/DL
ALP SERPL-CCNC: 103 U/L (ref 39–117)
ALT SERPL W P-5'-P-CCNC: 16 U/L (ref 1–33)
ANION GAP SERPL CALCULATED.3IONS-SCNC: 9 MMOL/L (ref 5–15)
AST SERPL-CCNC: 18 U/L (ref 1–32)
BASOPHILS # BLD AUTO: 0.03 10*3/MM3 (ref 0–0.2)
BASOPHILS NFR BLD AUTO: 0.4 % (ref 0–1.5)
BILIRUB SERPL-MCNC: 0.2 MG/DL (ref 0–1.2)
BUN SERPL-MCNC: 8 MG/DL (ref 8–23)
BUN SERPL-MCNC: ABNORMAL MG/DL
BUN/CREAT SERPL: ABNORMAL
CALCIUM SPEC-SCNC: 9 MG/DL (ref 8.6–10.5)
CHLORIDE SERPL-SCNC: 101 MMOL/L (ref 98–107)
CO2 SERPL-SCNC: 29 MMOL/L (ref 22–29)
CREAT SERPL-MCNC: 0.64 MG/DL (ref 0.57–1)
DEPRECATED RDW RBC AUTO: 44.4 FL (ref 37–54)
EOSINOPHIL # BLD AUTO: 0.23 10*3/MM3 (ref 0–0.4)
EOSINOPHIL NFR BLD AUTO: 2.7 % (ref 0.3–6.2)
ERYTHROCYTE [DISTWIDTH] IN BLOOD BY AUTOMATED COUNT: 13.8 % (ref 12.3–15.4)
GFR SERPL CREATININE-BSD FRML MDRD: 94 ML/MIN/1.73
GLOBULIN UR ELPH-MCNC: 2.9 GM/DL
GLUCOSE SERPL-MCNC: 103 MG/DL (ref 65–99)
HCT VFR BLD AUTO: 41.4 % (ref 34–46.6)
HGB BLD-MCNC: 14 G/DL (ref 12–15.9)
LDH SERPL-CCNC: 135 U/L (ref 135–214)
LYMPHOCYTES # BLD AUTO: 1.83 10*3/MM3 (ref 0.7–3.1)
LYMPHOCYTES NFR BLD AUTO: 21.7 % (ref 19.6–45.3)
MCH RBC QN AUTO: 30.6 PG (ref 26.6–33)
MCHC RBC AUTO-ENTMCNC: 33.8 G/DL (ref 31.5–35.7)
MCV RBC AUTO: 90.4 FL (ref 79–97)
MONOCYTES # BLD AUTO: 0.6 10*3/MM3 (ref 0.1–0.9)
MONOCYTES NFR BLD AUTO: 7.1 % (ref 5–12)
NEUTROPHILS NFR BLD AUTO: 5.75 10*3/MM3 (ref 1.7–7)
NEUTROPHILS NFR BLD AUTO: 68.1 % (ref 42.7–76)
PLATELET # BLD AUTO: 323 10*3/MM3 (ref 140–450)
PMV BLD AUTO: 9.2 FL (ref 6–12)
POTASSIUM SERPL-SCNC: 3.5 MMOL/L (ref 3.5–5.2)
PROT SERPL-MCNC: 6.7 G/DL (ref 6–8.5)
RBC # BLD AUTO: 4.58 10*6/MM3 (ref 3.77–5.28)
SODIUM SERPL-SCNC: 139 MMOL/L (ref 136–145)
WBC # BLD AUTO: 8.44 10*3/MM3 (ref 3.4–10.8)

## 2020-09-01 PROCEDURE — 36591 DRAW BLOOD OFF VENOUS DEVICE: CPT

## 2020-09-01 PROCEDURE — G0463 HOSPITAL OUTPT CLINIC VISIT: HCPCS | Performed by: INTERNAL MEDICINE

## 2020-09-01 PROCEDURE — 80053 COMPREHEN METABOLIC PANEL: CPT | Performed by: INTERNAL MEDICINE

## 2020-09-01 PROCEDURE — 85025 COMPLETE CBC W/AUTO DIFF WBC: CPT | Performed by: INTERNAL MEDICINE

## 2020-09-01 PROCEDURE — 83615 LACTATE (LD) (LDH) ENZYME: CPT | Performed by: INTERNAL MEDICINE

## 2020-09-01 PROCEDURE — 36415 COLL VENOUS BLD VENIPUNCTURE: CPT | Performed by: INTERNAL MEDICINE

## 2020-09-01 PROCEDURE — 25010000003 HEPARIN LOCK FLUSH PER 10 UNITS: Performed by: INTERNAL MEDICINE

## 2020-09-01 RX ORDER — HEPARIN SODIUM (PORCINE) LOCK FLUSH IV SOLN 100 UNIT/ML 100 UNIT/ML
500 SOLUTION INTRAVENOUS AS NEEDED
Status: CANCELLED | OUTPATIENT
Start: 2020-09-01

## 2020-09-01 RX ORDER — SODIUM CHLORIDE 0.9 % (FLUSH) 0.9 %
20 SYRINGE (ML) INJECTION AS NEEDED
Status: CANCELLED | OUTPATIENT
Start: 2020-09-01

## 2020-09-01 RX ORDER — HEPARIN SODIUM (PORCINE) LOCK FLUSH IV SOLN 100 UNIT/ML 100 UNIT/ML
500 SOLUTION INTRAVENOUS AS NEEDED
Status: DISCONTINUED | OUTPATIENT
Start: 2020-09-01 | End: 2020-09-02 | Stop reason: HOSPADM

## 2020-09-01 RX ORDER — SODIUM CHLORIDE 0.9 % (FLUSH) 0.9 %
20 SYRINGE (ML) INJECTION AS NEEDED
Status: DISCONTINUED | OUTPATIENT
Start: 2020-09-01 | End: 2020-09-02 | Stop reason: HOSPADM

## 2020-09-01 RX ADMIN — HEPARIN 500 UNITS: 100 SYRINGE at 14:46

## 2020-09-01 RX ADMIN — Medication 30 ML: at 14:43

## 2020-10-06 ENCOUNTER — HOSPITAL ENCOUNTER (OUTPATIENT)
Dept: ONCOLOGY | Facility: HOSPITAL | Age: 65
Setting detail: INFUSION SERIES
Discharge: HOME | End: 2020-10-06

## 2020-10-06 VITALS — WEIGHT: 164.2 LBS | HEIGHT: 65 IN | BODY MASS INDEX: 27.36 KG/M2

## 2020-10-06 DIAGNOSIS — Z45.2 ENCOUNTER FOR FITTING AND ADJUSTMENT OF VASCULAR CATHETER: Primary | ICD-10-CM

## 2020-10-06 DIAGNOSIS — C83.31 DIFFUSE LARGE B-CELL LYMPHOMA OF LYMPH NODES OF NECK (HCC): ICD-10-CM

## 2020-10-06 PROCEDURE — 96523 IRRIG DRUG DELIVERY DEVICE: CPT

## 2020-10-06 PROCEDURE — 25010000003 HEPARIN LOCK FLUSH PER 10 UNITS: Performed by: INTERNAL MEDICINE

## 2020-10-06 RX ORDER — HEPARIN SODIUM (PORCINE) LOCK FLUSH IV SOLN 100 UNIT/ML 100 UNIT/ML
500 SOLUTION INTRAVENOUS AS NEEDED
Status: CANCELLED | OUTPATIENT
Start: 2020-10-06

## 2020-10-06 RX ORDER — HEPARIN SODIUM (PORCINE) LOCK FLUSH IV SOLN 100 UNIT/ML 100 UNIT/ML
500 SOLUTION INTRAVENOUS AS NEEDED
Status: DISCONTINUED | OUTPATIENT
Start: 2020-10-06 | End: 2020-10-07 | Stop reason: HOSPADM

## 2020-10-06 RX ORDER — SODIUM CHLORIDE 0.9 % (FLUSH) 0.9 %
20 SYRINGE (ML) INJECTION AS NEEDED
Status: DISCONTINUED | OUTPATIENT
Start: 2020-10-06 | End: 2020-10-07 | Stop reason: HOSPADM

## 2020-10-06 RX ORDER — SODIUM CHLORIDE 0.9 % (FLUSH) 0.9 %
20 SYRINGE (ML) INJECTION AS NEEDED
Status: CANCELLED | OUTPATIENT
Start: 2020-10-06

## 2020-10-06 RX ADMIN — Medication 30 ML: at 14:12

## 2020-10-06 RX ADMIN — HEPARIN 500 UNITS: 100 SYRINGE at 14:14

## 2020-10-14 ENCOUNTER — TELEPHONE (OUTPATIENT)
Dept: ONCOLOGY | Facility: CLINIC | Age: 65
End: 2020-10-14

## 2020-10-14 NOTE — TELEPHONE ENCOUNTER
Caller: Lamarl    Relationship: Dr. Mk Lezama' office    Best call back number: [phone]    What form or medical record are you requesting: Last 2 labs    How would you like to receive the form or medical records (pick-up, mail, fax): Fax  If fax, what is the fax number: [phone]    Timeframe paperwork needed: Asap

## 2020-11-23 ENCOUNTER — HOSPITAL ENCOUNTER (OUTPATIENT)
Dept: PET IMAGING | Facility: HOSPITAL | Age: 65
Discharge: HOME | End: 2020-11-23
Admitting: INTERNAL MEDICINE

## 2020-11-23 DIAGNOSIS — C83.31 DIFFUSE LARGE B-CELL LYMPHOMA OF LYMPH NODES OF NECK (HCC): ICD-10-CM

## 2020-11-23 LAB — CREAT BLDA-MCNC: 0.7 MG/DL (ref 0.6–1.3)

## 2020-11-23 PROCEDURE — 0 IOPAMIDOL PER 1 ML: Performed by: INTERNAL MEDICINE

## 2020-11-23 PROCEDURE — 82565 ASSAY OF CREATININE: CPT

## 2020-11-23 PROCEDURE — 74160 CT ABDOMEN W/CONTRAST: CPT

## 2020-11-23 PROCEDURE — 70491 CT SOFT TISSUE NECK W/DYE: CPT

## 2020-11-23 PROCEDURE — 71260 CT THORAX DX C+: CPT

## 2020-11-23 RX ADMIN — IOPAMIDOL 150 ML: 755 INJECTION, SOLUTION INTRAVENOUS at 11:00

## 2020-12-01 NOTE — PROGRESS NOTES
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 Right tonsillar diffuse large B-cell lymphoma diagnosed 2018.  • This  female who claims to have developed a sore throat in 2018. She saw her primary care provider, Tran Vaz N.P. and was prescribed antibiotic. She did have palpable lymph nodes and a CT scan of the soft tissue neck was performed on 10/11/18 revealing asymmetric enlargement of the right palatine tonsil. There was right jugular digastric and posterior triangle adenopathy. The largest lymph node within the posterior triangle measured approximately 3.6 x 3.2 x 2.0 cm. She was referred to Sly Rosario M.D. and was seen by him in initial consultation on 18 where laryngoscopy revealed right tonsillar mass. FNA was performed on in on 10/31/18 with specimen sent for pathology, but routine staining was not performed. Flow cytometry revealed a CD10 positive monoclonal B-cell population which by light scatter analysis fell into both the small cell and large cell regions, though predominantly into the large cell region. FISH analysis for BCL2, BCL6 and MYC were negative. Though Dr. Rosario had requested routine staining of the specimen, this was not performed and Pathology had wanted a more fresh specimen, prior to which she was referred to me for consultation. The patient today is denying any weight loss. She does complain of low-grade fevers controlled with Aspirin and claims to have had night sweats on and off for 10 years.   • 18 - [facility] cell non-Hodgkin's lymphoma of the right tonsil on referral by  Sly Rosario M.D. WBC 10.9 with 69% neutrophils, 23% lymphocytes, 6% monocytes, hemoglobin 13.8, platelet count 408,000.  ().  Globulin 4.1 (2.5-3.8), uric acid 5.0 (2.6-8.0), ESR 61 (0-30).      • 11/16/18 - Bone marrow aspiration and biopsy with normocellular marrow with maturing trilineage hematopoiesis. Immunohistochemistry had no increase in CD34 positive blasts or CD20 positive B-lymphocytes. Flow cytometry had no evidence of an abnormal cell type. Cytogenetics revealed 48 XX normal female karyotype. Echocardiogram with mild mitral and mild tricuspid regurgitation, moderate concentric left ventricular hypertrophy and LV systolic function normal with EF about 65-70%.   • 11/17/18 - Echocardiogram with mild mitral and tricuspid regurgitation, moderate concentric left ventricular hypertrophy, LV systolic function normal with EF about 65-70%.   • 11/19/18 - Chest x-ray with clear lungs.   • 11/20/18 - PET scan with extensive right neck lymphadenopathy beginning at the right tonsillar pillar and continuing to the supraclavicular region with SUV between 26 and 16 with extremely hypermetabolic lymph nodes, changes of cholelithiasis and old healed granulomatous disease. Mass of the nodes at L2 are approximately 6.5 x 4.2 cm at the level 3 adenopathy is also seen over an area of 5.5 x 2.8 submandibular gland and sternocleidomastoid muscle are poorly-delineated in the adenopathy. Supraclavicular lymph node is seen measuring over 24 mm. No hypermetabolic activity in the abdomen. Mild infrarenal abdominal aortic aneurysm measuring 28 mm.   • 11/29/18 - Patient underwent Infusaport placement by Ashish Mcginnis M.D.   • 11/30/18 - WBC 8.5, hemoglobin 12.7, platelet count 358,000. Discussed workup results. Patient started on Allopurinol 300 mg p.o. daily with repeat tonsillar biopsy planned for histology. SPEP with hypoalbuminemia. Hepatitis B core antibody surface antigen, hepatis C antibody all  non-reactive.   • 12/19/18 - Patient underwent ultrasound-guided needle biopsy of right neck mass by Ashish Funk M.D. with pathology revealing diffuse large B-cell lymphoma germinal center type. Flow cytometry revealed CD10 positive B-cell lymphoma. The lymphoma was CD20 and surface kappa and lambda chain positive.   • 12/27/18 - Chemotherapy orders written for R-CHOP. Rituximab 375 mg/M2 IV day 1, Cyclophosphamide 750 mg/M2 IV day 1, Doxorubicin 50 mg/M2 IV day 1, Vincristine 1.4 mg/M2 IV day 1 with a max of 2 mg dose and Prednisone 100 mg p.o. days 3-8 to cycle every 21 days for about six cycles in a curative intent. Neulasta Onpro also ordered.    • 1/3/19 - Discussed results of workup and chemotherapy.   • 1/10/19 - Cycle 1 chemotherapy given.   • 1/17/19 - WBC 2.1 with 28% neutrophils, 49% lymphocytes, 8% monocytes, hemoglobin 12.9, platelet count 234,000.   • 1/22/19 - Patient tolerating chemotherapy well. Sed rate 49 (0-30).  ESR 49 (H).   • 1/31/19 - Cycle 2 R-CHOP initiated with Neulasta support. Sed rate 81 (0-30).  ESR 81 (H). Uric acid 2.4 (L). Sodium 133 (L).   • 2/7/19 - Labs at [facility] with verbal report of WBC 1.5 and ANC 0.41. Cipro 500 mg p.o. b.i.d. x7 days as prophylaxis prescribed.   • 2/12/19 - Patient tolerating chemotherapy overall well to date with some expected taste changes and constipation. Patient advised she may use MiraLAX p.r.n. and patient advised to play with diet as well use of sour candies or mints to stimulate the taste buds. A brief period of neutropenia without febrile neutropenia noted during the last cycle with Neulasta given. Allopurinol discontinued.   • 2/21/19 - Ferritin 121 (N), iron 18 (L),  (N), iron saturation 6% (L). Prescribed Ferrous Sulfate 325 mg daily. Received cycle 3 day 1 R-CHOP. Port will not draw. Activase utilized and started on Coumadin 1 mg p.o. daily.   • 2/28/19 - Stool heme negative x3. CT neck and chest with contrast showed  previously-identified mass centered with right palatine tonsils appears to have resolved. Previously-identified right cervical lymphadenopathy has resolved. No evidence of lymphadenopathy within the chest. 4 mm left upper lobe nodule is stable from prior PET CT.  This is indeterminate. Recommend attention to follow up. Echocardiogram showed aortic valve sclerosis, EF 65-70%. No pericardial effusion.   • 3/6/19 - Discussed with the patient the option of either continuing with chemotherapy for a total of six cycles versus stopping chemotherapy and proceeding with radiation to her site of disease. Patient would like to complete planned chemotherapy course.   • 3/6/19 - PT 11.8, INR 1.0.  Patient continued on Coumadin 1 mg p.o. daily for port malfunction.  ESR 64, high.    • 3/14/19 - Cycle 4 chemotherapy given.   • 4/4/19 - Cycle 5 chemotherapy given.   • 4/16/19 - Patient complains of upper respiratory symptoms which are improving on Cipro.   • 4/25/19 - Cycle 6 chemotherapy given. Urinalysis showed trace heme.   • 5/5/19 - . Prescribed Ciprofloxacin 500 mg p.o. b.i.d. x7 days.   • 5/8/19 - Echocardiogram 60-65% ejection fraction with mild mitral and tricuspid regurgitation. CT soft tissue neck with no acute process or adenopathy seen. CT chest, abdomen and pelvis showed no acute cardiopulmonary process, no suspicious lymphadenopathy. Previously described 4 mm nodule in the left upper lobe is likely an intrafissural lymph node. No acute intraabdominal or intrapelvic process. No suspicious lymphadenopathy. Atherosclerosis with infrarenal abdominal aortic ectasia/aneurysm with mural thrombus formation. Coumadin increased to 5 mg p.o. Q.PM.  • 8/8/2019 Coumadin dose decreased to 1 mg p.o. daily for [id number] maintenance. Sed rate 61 (0-30).  ().  • 11/1/19 - CT soft tissue neck showed no evidence of recurrent or metastatic disease in the neck in this patient with a history of lymphoma. No acute  findings. CT chest abdomen and pelvis showed No convincing evidence of recurrent or metastatic disease in the chest, abdomen or pelvis. Stable 3.2 cm fusiform infrarenal abdominal aortic aneurysm with 2% luminal stenosis secondary to atherosclerotic plaquing, and irregular ulcerated plaquing. Stable 4 mm noncalcified left upper lobe pulmonary nodule since 11/20/2018. Benign etiology is favored. This may represent a noncalcified granuloma, given the extensive granulomatous changes elsewhere within the chest.  Uncomplicated cholelithiasis.  • 5/12/2020: CT soft tissue neck with contrast, CT chest with contrast: No evidence of lymphadenopathy within the neck or chest.  Stable small nodule in the right breast likely benign.  Recommend correlation with mammography.  • 8/3/2020: Bilateral 3D mammogram: BI-RADS 2 benign.  No evidence of malignancy.  Will circumscribed masses within both breasts, more numerous in the left breast, appear relatively stable from prior mammograms from 2014.   • 9/1/2020   • 11/23/2020 No convincing evidence of recurrent disease or metastatic disease in the chest, abdomen, or pelvis. A 4 mm noncalcified left upper lobe nodule is stable since the most remote available PET/CT from 11/20/2018. This confirms over 2 years of stability and benignity.  Stable 3.2 cm fusiform infrarenal abdominal aortic aneurysm with irregular and eccentric mural thrombus.  Uncomplicated cholelithiasis.       2.   Iron deficiency anemia diagnosed February 2018.   • 1/31/19 - Hemoglobin 11.9, MCV 87.9 and RDW 14.9.   • 2/21/19 - Ferritin 121 (N), iron 18 (L),  (N), iron saturation 6 (L). Prescribed Ferrous Sulfate 325 mg one tablet p.o. daily.   • 2/28/19 - Stool heme negative x3.   • 3/26/19 - Patient reports she is not taking ferrous sulfate daily as prescribed.  She chose to increase her intake of red meat.  Instructed to start taking ferrous sulfate 325 mg p.o. daily.  Explained rationale.   • 5/14/19 -  160 (). Iron 17 (). TIBC 283 (228-428). Iron saturation 6 (15-50).  Ferritin 160.  • 8/8/2019 hemoglobin 13.2, MCV 82. Ferritin 72 ().   • 5/21/2020: Ferritin 107.3, iron saturation 17 low, serum iron 57, TIBC 332, , creatinine 0.55, LFTs normal, WBC 8.9, hemoglobin 14.6, platelets 341, MCV 90.6  • 12/3/2020 WBC 10.1, Hgb 14.5, Plts 336    3.   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   • 5/9/19 - Prescribed Coumadin 5 mg p.o. daily. Referred to Dr. Duckworth for evaluation of infrarenal aortic aneurysm and mural thrombus seen on CT abdomen and pelvis.   • 5/14/19 - INR 1.4. Increased Coumadin to 6 mg p.o. daily. Follow INR protocol. INR’s to be done at [facility]. Prescription given.  • 8/8/2019 patient claims to have been seen by Dr. Duckworth and advised to stop warfarin.    Past Medical History:   Diagnosis Date   • Hypertension 1998     History reviewed. No pertinent surgical history.      Current Outpatient Medications:   •  amLODIPine (NORVASC) 2.5 MG tablet, , Disp: , Rfl:   •  B Complex Vitamins (VITAMIN B COMPLEX 100 IJ), Inject  as directed., Disp: , Rfl:   •  Calcium-Magnesium-Vitamin D (CALCIUM 500 PO), Take  by mouth Daily., Disp: , Rfl:   •  Cholecalciferol (VITAMIN D) 2000 units capsule, Take  by mouth Daily., Disp: , Rfl:   •  Loratadine (CLARITIN) 10 MG capsule, Take  by mouth Daily., Disp: , Rfl:   •  Potassium 99 MG tablet, Take  by mouth Daily., Disp: , Rfl:   •  warfarin (COUMADIN) 1 MG tablet, TAKE 1 TABLET BY MOUTH EVERY DAY, Disp: 30 tablet, Rfl: 3  No current facility-administered medications for this visit.     Facility-Administered Medications Ordered in Other Visits:   •  heparin injection 500 Units, 500 Units, Intravenous, PRN, Madan Lee MD  •  sodium chloride 0.9 % flush 20 mL, 20 mL, Intravenous, PRN, Madan Lee MD    No Known Allergies    Family History   Problem Relation Age of Onset   • Kidney cancer Father 66   •  Leukemia Brother 48   • Multiple myeloma Brother 47     Cancer-related family history includes Kidney cancer (age of onset: 66) in her father.    Social History     Tobacco Use   • Smoking status: Current Every Day Smoker     Packs/day: 0.50     Years: 10.00     Pack years: 5.00     Types: Cigarettes   • Smokeless tobacco: Never Used   • Tobacco comment: started smoking when she was in her 20’s   Substance Use Topics   • Alcohol use: No     Frequency: Never   • Drug use: No     I have reviewed the history of present illness, past medical history, family history, social history, lab results, all notes and other records since the patient was last seen on 8/8/19.     SUBJECTIVE:   Patient is here for follow up of right tonsillar diffuse large B-cell lymphoma.  She denies any fevers chills night sweats weight loss or lymph node swelling.  She denies any dysphagia or any swelling in the throat. She had a recent mammogram performed at [facility] on 08/03/20. She performs monthly breast exams. She continues to smoke less than a pack a day.  Discussed moving cessation with the patient.  She will consider smoking cessation but not right now.  Taking iron 1 tablet daily..        REVIEW OF SYSTEMS:  Review of Systems   Constitutional: Negative for chills and fever.   HENT: Negative for ear pain, mouth sores, nosebleeds and sore throat.    Eyes: Negative for photophobia and visual disturbance.   Respiratory: Negative for wheezing and stridor.    Cardiovascular: Negative for chest pain and palpitations.   Gastrointestinal: Negative for abdominal pain, diarrhea, nausea and vomiting.   Endocrine: Negative for cold intolerance and heat intolerance.   Genitourinary: Negative for dysuria and hematuria.   Musculoskeletal: Negative for joint swelling and neck stiffness.   Skin: Negative for color change and rash.   Neurological: Negative for seizures and syncope.   Hematological: Negative for adenopathy.        No obvious  "bleeding   Psychiatric/Behavioral: Negative for agitation, confusion and hallucinations.       OBJECTIVE:    Vitals:    12/03/20 1411   BP: (!) 173/102   Pulse: 97   Resp: 18   Temp: 97.3 °F (36.3 °C)   Weight: 76.2 kg (168 lb)   Height: 165.1 cm (65\")   PainSc: 0-No pain     ECOG  (0) Fully active, able to carry on all predisease performance without restriction    Physical Exam   Constitutional: She is oriented to person, place, and time. She appears well-developed. No distress.   HENT:   Head: Normocephalic and atraumatic.   Eyes: Conjunctivae are normal. Right eye exhibits no discharge. Left eye exhibits no discharge. No scleral icterus.   Neck: Normal range of motion. Neck supple. No thyromegaly present.   Cardiovascular: Normal rate, regular rhythm and normal heart sounds. Exam reveals no gallop and no friction rub.   Pulmonary/Chest: Effort normal. No stridor. No respiratory distress. She has no wheezes.   Left chest wall port..  No palpable mass on breast exam.  Slightly decreased air entry in the lungs.   Abdominal: Soft. Bowel sounds are normal. She exhibits no mass. There is no abdominal tenderness. There is no rebound and no guarding.   Musculoskeletal: Normal range of motion. No tenderness.      Comments: Degenerative changes.    Lymphadenopathy:     She has no cervical adenopathy.   Neurological: She is alert and oriented to person, place, and time. She exhibits normal muscle tone.   Skin: Skin is warm. No rash noted. She is not diaphoretic. No erythema.   Psychiatric: Her behavior is normal.   Nursing note and vitals reviewed.     I have reexamined the patient and the results are consistent with the previously documented exam. Madan Lee MD       RECENT LABS  Lab Results - Last 18 Months   Lab Units 12/03/20  1407 09/01/20  1446 05/21/20  1524   WBC 10*3/mm3 10.16 8.44 8.99   HEMOGLOBIN g/dL 14.5 14.0 14.6   HEMATOCRIT % 44.3 41.4 43.2   PLATELETS 10*3/mm3 336 323 341   MCV fL 91.3 90.4 90.6     Lab " Results - Last 18 Months   Lab Units 11/23/20  1052 09/01/20  1446 05/21/20  1524  11/14/19  1550   SODIUM mmol/L  --  139 140  --  139   POTASSIUM mmol/L  --  3.5 3.6  --  3.4*   CHLORIDE mmol/L  --  101 103  --  98   CO2 mmol/L  --  29.0 25.0  --  28.0   BUN   --  8 6*  --  10   CREATININE mg/dL 0.70 0.64 0.55*   < > 0.56*   CALCIUM mg/dL  --  9.0 8.6  --  9.5   BILIRUBIN mg/dL  --  0.2 0.2  --  0.3   ALK PHOS U/L  --  103 94  --  88   ALT (SGPT) U/L  --  16 17  --  21   AST (SGOT) U/L  --  18 19  --  22   GLUCOSE mg/dL  --  103* 94  --  81    < > = values in this interval not displayed.     Lab Results   Component Value Date    GLUCOSE 103 (H) 09/01/2020    BUN  09/01/2020      Comment:      Testing performed by alternate method    BUN 8 09/01/2020    CREATININE 0.70 11/23/2020    EGFRIFNONA 94 09/01/2020    BCR  09/01/2020      Comment:      Testing not performed    K 3.5 09/01/2020    CO2 29.0 09/01/2020    CALCIUM 9.0 09/01/2020    ALBUMIN 3.80 09/01/2020    LABIL2 0.8 (L) 04/25/2019    AST 18 09/01/2020    ALT 16 09/01/2020     Lab Results   Component Value Date    IRON 57 05/21/2020    TIBC 332 05/21/2020    FERRITIN 107.30 05/21/2020     No results found for: FOLATE  No results found for: OCCULTBLD  No results found for: RETICCTPCT  No results found for: YFDLAXZE18, TSH  No results found for: SPEP, UPEP  LDH   Date Value Ref Range Status   09/01/2020 135 135 - 214 U/L Final     Uric Acid   Date Value Ref Range Status   01/31/2019 2.4 (L) 2.6 - 8.0 mg/dL Final     Lab Results   Component Value Date    SEDRATE 61 (H) 08/08/2019     No results found for: FIBRINOGEN, HAPTOGLOBIN, DDIMER  No results found for: DDIMER  Lab Results   Component Value Date    INR 2.30 08/06/2019     No results found for:   No results found for: CEA  No results found for:   No results found for: PSA  No results found for: [id number]    Assessment/Plan     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 Diffuse large B-cell lymphoma of lymph nodes of neck (CMS/HCC): Status post 6 cycles of R-CHOP  finished 4/25/2019: Restaging CT scan 5/12/2020 does not show any evidence of lymphadenopathy in the neck chest.  Restaging CT scan on 11/23/2020 negative for recurrence.  2. Encounter for care related to vascular access port   3. Iron deficiency anemia due to chronic blood loss: No taking oral iron.  Hemoglobin normal.  4. Smoking: She will consider smoking cessation, but not right now  5. Aortic mural thrombus   6. Right breast nodule seen on recent CT scan.  No palpable abnormality.  Mammogram normal on 8/3/2020      PLAN:    · Continue iron supplement once a day.  · Recommended CT scan neck, chest abdomen with contrast in 6 months  · CBC CMP LDH in 6 months  · Arrange for Port-A-Cath removal  · Continue  monthly self breast exams.  · Smoking cessation counseling provided.  However patient not interested in quitting at this point.  · recommended monthly breast exams..  Recommended regular screening.           Thank you very much for providing the opportunity to participate in this patient’s care. Please do not hesitate to call if there are any other questions.      I have reviewed and confirmed the accuracy of the patient's history: Chief complaint, HPI, ROS and Subjective as entered by the MA/LPN/RN. Madan Lee MD 12/03/20         Electronically signed by Madan Lee MD, 12/03/20, 2:59 PM EST.

## 2020-12-03 ENCOUNTER — HOSPITAL ENCOUNTER (OUTPATIENT)
Dept: ONCOLOGY | Facility: HOSPITAL | Age: 65
Setting detail: INFUSION SERIES
Discharge: HOME | End: 2020-12-03

## 2020-12-03 ENCOUNTER — OFFICE VISIT (OUTPATIENT)
Dept: ONCOLOGY | Facility: CLINIC | Age: 65
End: 2020-12-03

## 2020-12-03 ENCOUNTER — APPOINTMENT (OUTPATIENT)
Dept: LAB | Facility: HOSPITAL | Age: 65
End: 2020-12-03

## 2020-12-03 VITALS
HEIGHT: 65 IN | DIASTOLIC BLOOD PRESSURE: 102 MMHG | TEMPERATURE: 97.3 F | SYSTOLIC BLOOD PRESSURE: 173 MMHG | BODY MASS INDEX: 27.99 KG/M2 | HEART RATE: 97 BPM | WEIGHT: 168 LBS | RESPIRATION RATE: 18 BRPM

## 2020-12-03 DIAGNOSIS — C83.31 DIFFUSE LARGE B-CELL LYMPHOMA OF LYMPH NODES OF NECK (HCC): ICD-10-CM

## 2020-12-03 DIAGNOSIS — C83.31 DIFFUSE LARGE B-CELL LYMPHOMA OF LYMPH NODES OF NECK (HCC): Primary | ICD-10-CM

## 2020-12-03 DIAGNOSIS — Z45.2 ENCOUNTER FOR FITTING AND ADJUSTMENT OF VASCULAR CATHETER: Primary | ICD-10-CM

## 2020-12-03 LAB
BASOPHILS # BLD AUTO: 0.03 10*3/MM3 (ref 0–0.2)
BASOPHILS NFR BLD AUTO: 0.3 % (ref 0–1.5)
DEPRECATED RDW RBC AUTO: 45.1 FL (ref 37–54)
EOSINOPHIL # BLD AUTO: 0.17 10*3/MM3 (ref 0–0.4)
EOSINOPHIL NFR BLD AUTO: 1.7 % (ref 0.3–6.2)
ERYTHROCYTE [DISTWIDTH] IN BLOOD BY AUTOMATED COUNT: 13.8 % (ref 12.3–15.4)
HCT VFR BLD AUTO: 44.3 % (ref 34–46.6)
HGB BLD-MCNC: 14.5 G/DL (ref 12–15.9)
LYMPHOCYTES # BLD AUTO: 1.91 10*3/MM3 (ref 0.7–3.1)
LYMPHOCYTES NFR BLD AUTO: 18.8 % (ref 19.6–45.3)
MCH RBC QN AUTO: 29.9 PG (ref 26.6–33)
MCHC RBC AUTO-ENTMCNC: 32.7 G/DL (ref 31.5–35.7)
MCV RBC AUTO: 91.3 FL (ref 79–97)
MONOCYTES # BLD AUTO: 0.76 10*3/MM3 (ref 0.1–0.9)
MONOCYTES NFR BLD AUTO: 7.5 % (ref 5–12)
NEUTROPHILS NFR BLD AUTO: 7.29 10*3/MM3 (ref 1.7–7)
NEUTROPHILS NFR BLD AUTO: 71.7 % (ref 42.7–76)
PLATELET # BLD AUTO: 336 10*3/MM3 (ref 140–450)
PMV BLD AUTO: 9.9 FL (ref 6–12)
RBC # BLD AUTO: 4.85 10*6/MM3 (ref 3.77–5.28)
WBC # BLD AUTO: 10.16 10*3/MM3 (ref 3.4–10.8)

## 2020-12-03 PROCEDURE — G0463 HOSPITAL OUTPT CLINIC VISIT: HCPCS | Performed by: INTERNAL MEDICINE

## 2020-12-03 PROCEDURE — 36415 COLL VENOUS BLD VENIPUNCTURE: CPT | Performed by: INTERNAL MEDICINE

## 2020-12-03 PROCEDURE — 25010000003 HEPARIN LOCK FLUSH PER 10 UNITS: Performed by: INTERNAL MEDICINE

## 2020-12-03 PROCEDURE — 85025 COMPLETE CBC W/AUTO DIFF WBC: CPT | Performed by: INTERNAL MEDICINE

## 2020-12-03 PROCEDURE — 99214 OFFICE O/P EST MOD 30 MIN: CPT | Performed by: INTERNAL MEDICINE

## 2020-12-03 RX ORDER — SODIUM CHLORIDE 0.9 % (FLUSH) 0.9 %
20 SYRINGE (ML) INJECTION AS NEEDED
Status: CANCELLED | OUTPATIENT
Start: 2020-12-03

## 2020-12-03 RX ORDER — HEPARIN SODIUM (PORCINE) LOCK FLUSH IV SOLN 100 UNIT/ML 100 UNIT/ML
500 SOLUTION INTRAVENOUS AS NEEDED
Status: CANCELLED | OUTPATIENT
Start: 2020-12-03

## 2020-12-03 RX ORDER — SODIUM CHLORIDE 0.9 % (FLUSH) 0.9 %
20 SYRINGE (ML) INJECTION AS NEEDED
Status: DISCONTINUED | OUTPATIENT
Start: 2020-12-03 | End: 2020-12-04 | Stop reason: HOSPADM

## 2020-12-03 RX ORDER — HEPARIN SODIUM (PORCINE) LOCK FLUSH IV SOLN 100 UNIT/ML 100 UNIT/ML
500 SOLUTION INTRAVENOUS AS NEEDED
Status: DISCONTINUED | OUTPATIENT
Start: 2020-12-03 | End: 2020-12-04 | Stop reason: HOSPADM

## 2020-12-03 RX ADMIN — HEPARIN 500 UNITS: 100 SYRINGE at 15:20

## 2020-12-03 RX ADMIN — Medication 30 ML: at 15:20

## 2021-01-14 ENCOUNTER — HOSPITAL ENCOUNTER (OUTPATIENT)
Dept: ONCOLOGY | Facility: HOSPITAL | Age: 66
Setting detail: INFUSION SERIES
Discharge: HOME | End: 2021-01-14

## 2021-01-14 DIAGNOSIS — C83.31 DIFFUSE LARGE B-CELL LYMPHOMA OF LYMPH NODES OF NECK (HCC): ICD-10-CM

## 2021-01-14 DIAGNOSIS — Z45.2 ENCOUNTER FOR FITTING AND ADJUSTMENT OF VASCULAR CATHETER: Primary | ICD-10-CM

## 2021-01-14 PROCEDURE — 25010000003 HEPARIN LOCK FLUSH PER 10 UNITS: Performed by: INTERNAL MEDICINE

## 2021-01-14 PROCEDURE — 96523 IRRIG DRUG DELIVERY DEVICE: CPT

## 2021-01-14 RX ORDER — HEPARIN SODIUM (PORCINE) LOCK FLUSH IV SOLN 100 UNIT/ML 100 UNIT/ML
500 SOLUTION INTRAVENOUS AS NEEDED
Status: DISCONTINUED | OUTPATIENT
Start: 2021-01-14 | End: 2021-01-15 | Stop reason: HOSPADM

## 2021-01-14 RX ORDER — SODIUM CHLORIDE 0.9 % (FLUSH) 0.9 %
20 SYRINGE (ML) INJECTION AS NEEDED
Status: CANCELLED | OUTPATIENT
Start: 2021-01-14

## 2021-01-14 RX ORDER — HEPARIN SODIUM (PORCINE) LOCK FLUSH IV SOLN 100 UNIT/ML 100 UNIT/ML
500 SOLUTION INTRAVENOUS AS NEEDED
Status: CANCELLED | OUTPATIENT
Start: 2021-01-14

## 2021-01-14 RX ORDER — SODIUM CHLORIDE 0.9 % (FLUSH) 0.9 %
20 SYRINGE (ML) INJECTION AS NEEDED
Status: DISCONTINUED | OUTPATIENT
Start: 2021-01-14 | End: 2021-01-15 | Stop reason: HOSPADM

## 2021-01-14 RX ADMIN — HEPARIN 500 UNITS: 100 SYRINGE at 14:51

## 2021-01-14 RX ADMIN — Medication 20 ML: at 14:50

## 2021-01-19 ENCOUNTER — TELEPHONE (OUTPATIENT)
Dept: ONCOLOGY | Facility: HOSPITAL | Age: 66
End: 2021-01-19

## 2021-01-19 RX ORDER — WARFARIN SODIUM 1 MG/1
1 TABLET ORAL DAILY
Qty: 30 TABLET | Refills: 3 | Status: SHIPPED | OUTPATIENT
Start: 2021-01-19 | End: 2022-07-07

## 2021-01-19 NOTE — TELEPHONE ENCOUNTER
Called pt to let her know that Dr Lee does want her to continue to take coumadin. Pt verbalized understanding.

## 2021-01-19 NOTE — TELEPHONE ENCOUNTER
----- Message from Lucille Avina RN sent at 1/19/2021  4:04 PM EST -----  Patient will not need INR since we will not be adjusting it.   ----- Message -----  From: Erika Gonsales RN  Sent: 1/19/2021   8:04 AM EST  To: Lucille Avina RN    I can call pt and let her know. She needs new prescription and will need to get scheduled for INR's.  ----- Message -----  From: Lucille Avina RN  Sent: 1/18/2021  10:04 AM EST  To: Erika Gonsales RN    Okay to continue. Do you want to let patient know?  ----- Message -----  From: Madan Lee MD  Sent: 1/15/2021   7:35 PM EST  To: Lucille Avina RN    Ok to continue  ----- Message -----  From: Lucille Avina RN  Sent: 1/15/2021   4:14 PM EST  To: Madan Lee MD    Pt wanting to know if she needs to continue 1mg Coumadin for Port function.     Pt was in this week and it taco blood just fine.   ----- Message -----  From: Erika Gonsales RN  Sent: 1/14/2021   2:54 PM EST  To: Lucille Avina RN    Pt here today for port flush. She doesn't see Dr Lee until May, but wanted to ask about warfarin. Pt used to see Dr Larkin and said one of his NP's put her on warfarin due to port blood return issues. Pt is out of prescription and just wants to make sure it's ok that she's no longer taking it.   She's gone home, but I told her that I'd check on it for her and if needed to restart, will call her.

## 2021-02-11 ENCOUNTER — APPOINTMENT (OUTPATIENT)
Dept: ONCOLOGY | Facility: HOSPITAL | Age: 66
End: 2021-02-11

## 2021-03-11 ENCOUNTER — HOSPITAL ENCOUNTER (OUTPATIENT)
Dept: ONCOLOGY | Facility: HOSPITAL | Age: 66
Setting detail: INFUSION SERIES
Discharge: HOME | End: 2021-03-11

## 2021-03-11 DIAGNOSIS — C83.31 DIFFUSE LARGE B-CELL LYMPHOMA OF LYMPH NODES OF NECK (HCC): ICD-10-CM

## 2021-03-11 DIAGNOSIS — Z45.2 ENCOUNTER FOR FITTING AND ADJUSTMENT OF VASCULAR CATHETER: Primary | ICD-10-CM

## 2021-03-11 PROCEDURE — 25010000003 HEPARIN LOCK FLUSH PER 10 UNITS: Performed by: INTERNAL MEDICINE

## 2021-03-11 PROCEDURE — 96523 IRRIG DRUG DELIVERY DEVICE: CPT

## 2021-03-11 RX ORDER — HEPARIN SODIUM (PORCINE) LOCK FLUSH IV SOLN 100 UNIT/ML 100 UNIT/ML
500 SOLUTION INTRAVENOUS AS NEEDED
Status: CANCELLED | OUTPATIENT
Start: 2021-03-11

## 2021-03-11 RX ORDER — SODIUM CHLORIDE 0.9 % (FLUSH) 0.9 %
20 SYRINGE (ML) INJECTION AS NEEDED
Status: DISCONTINUED | OUTPATIENT
Start: 2021-03-11 | End: 2021-03-12 | Stop reason: HOSPADM

## 2021-03-11 RX ORDER — HEPARIN SODIUM (PORCINE) LOCK FLUSH IV SOLN 100 UNIT/ML 100 UNIT/ML
500 SOLUTION INTRAVENOUS AS NEEDED
Status: DISCONTINUED | OUTPATIENT
Start: 2021-03-11 | End: 2021-03-12 | Stop reason: HOSPADM

## 2021-03-11 RX ORDER — SODIUM CHLORIDE 0.9 % (FLUSH) 0.9 %
20 SYRINGE (ML) INJECTION AS NEEDED
Status: CANCELLED | OUTPATIENT
Start: 2021-03-11

## 2021-03-11 RX ADMIN — HEPARIN 500 UNITS: 100 SYRINGE at 14:25

## 2021-03-11 RX ADMIN — Medication 20 ML: at 14:22

## 2021-04-08 ENCOUNTER — HOSPITAL ENCOUNTER (OUTPATIENT)
Dept: ONCOLOGY | Facility: HOSPITAL | Age: 66
Setting detail: INFUSION SERIES
Discharge: HOME | End: 2021-04-08

## 2021-04-08 VITALS — HEIGHT: 65 IN | BODY MASS INDEX: 28.66 KG/M2 | WEIGHT: 172 LBS

## 2021-04-08 DIAGNOSIS — Z45.2 ENCOUNTER FOR FITTING AND ADJUSTMENT OF VASCULAR CATHETER: Primary | ICD-10-CM

## 2021-04-08 DIAGNOSIS — C83.31 DIFFUSE LARGE B-CELL LYMPHOMA OF LYMPH NODES OF NECK (HCC): ICD-10-CM

## 2021-04-08 PROCEDURE — 25010000003 HEPARIN LOCK FLUSH PER 10 UNITS: Performed by: INTERNAL MEDICINE

## 2021-04-08 PROCEDURE — 96523 IRRIG DRUG DELIVERY DEVICE: CPT

## 2021-04-08 RX ORDER — SODIUM CHLORIDE 0.9 % (FLUSH) 0.9 %
20 SYRINGE (ML) INJECTION AS NEEDED
Status: DISCONTINUED | OUTPATIENT
Start: 2021-04-08 | End: 2021-04-09 | Stop reason: HOSPADM

## 2021-04-08 RX ORDER — HEPARIN SODIUM (PORCINE) LOCK FLUSH IV SOLN 100 UNIT/ML 100 UNIT/ML
500 SOLUTION INTRAVENOUS AS NEEDED
Status: DISCONTINUED | OUTPATIENT
Start: 2021-04-08 | End: 2021-04-09 | Stop reason: HOSPADM

## 2021-04-08 RX ORDER — HEPARIN SODIUM (PORCINE) LOCK FLUSH IV SOLN 100 UNIT/ML 100 UNIT/ML
500 SOLUTION INTRAVENOUS AS NEEDED
Status: CANCELLED | OUTPATIENT
Start: 2021-04-08

## 2021-04-08 RX ORDER — SODIUM CHLORIDE 0.9 % (FLUSH) 0.9 %
20 SYRINGE (ML) INJECTION AS NEEDED
Status: CANCELLED | OUTPATIENT
Start: 2021-04-08

## 2021-04-08 RX ADMIN — Medication 30 ML: at 14:06

## 2021-04-08 RX ADMIN — HEPARIN 500 UNITS: 100 SYRINGE at 14:08

## 2021-05-06 ENCOUNTER — HOSPITAL ENCOUNTER (OUTPATIENT)
Dept: ONCOLOGY | Facility: HOSPITAL | Age: 66
Setting detail: INFUSION SERIES
Discharge: HOME | End: 2021-05-06

## 2021-05-06 VITALS — BODY MASS INDEX: 28.32 KG/M2 | WEIGHT: 170 LBS | HEIGHT: 65 IN

## 2021-05-06 DIAGNOSIS — Z45.2 ENCOUNTER FOR FITTING AND ADJUSTMENT OF VASCULAR CATHETER: Primary | ICD-10-CM

## 2021-05-06 DIAGNOSIS — C83.31 DIFFUSE LARGE B-CELL LYMPHOMA OF LYMPH NODES OF NECK (HCC): ICD-10-CM

## 2021-05-06 PROCEDURE — 96523 IRRIG DRUG DELIVERY DEVICE: CPT

## 2021-05-06 PROCEDURE — 25010000002 HEPARIN LOCK FLUSH PER 10 UNITS: Performed by: INTERNAL MEDICINE

## 2021-05-06 RX ORDER — HEPARIN SODIUM (PORCINE) LOCK FLUSH IV SOLN 100 UNIT/ML 100 UNIT/ML
500 SOLUTION INTRAVENOUS AS NEEDED
Status: CANCELLED | OUTPATIENT
Start: 2021-05-06

## 2021-05-06 RX ORDER — SODIUM CHLORIDE 0.9 % (FLUSH) 0.9 %
20 SYRINGE (ML) INJECTION AS NEEDED
Status: CANCELLED | OUTPATIENT
Start: 2021-05-06

## 2021-05-06 RX ORDER — SODIUM CHLORIDE 0.9 % (FLUSH) 0.9 %
20 SYRINGE (ML) INJECTION AS NEEDED
Status: DISCONTINUED | OUTPATIENT
Start: 2021-05-06 | End: 2021-05-07 | Stop reason: HOSPADM

## 2021-05-06 RX ORDER — HEPARIN SODIUM (PORCINE) LOCK FLUSH IV SOLN 100 UNIT/ML 100 UNIT/ML
500 SOLUTION INTRAVENOUS AS NEEDED
Status: DISCONTINUED | OUTPATIENT
Start: 2021-05-06 | End: 2021-05-07 | Stop reason: HOSPADM

## 2021-05-06 RX ADMIN — HEPARIN 500 UNITS: 100 SYRINGE at 13:52

## 2021-05-06 RX ADMIN — Medication 30 ML: at 13:50

## 2021-06-01 NOTE — PROGRESS NOTES
Hematology/Oncology Outpatient Follow Up    PATIENT NAME:Sarah Borja  :1955  MRN: [id number]  PRIMARY CARE PHYSICIAN: Miguel Lezama MD  REFERRING PHYSICIAN: Miguel Lezama, *    Chief Complaint   Patient presents with   • Appointment     Diffuse large B-cell lymphoma of lymph nodes of neck    • Follow-up        HISTORY OF PRESENT ILLNESS:   1. Right tonsillar diffuse large B-cell lymphoma diagnosed 2018.  • This  female who claims to have developed a sore throat in 2018. She saw her primary care provider, Tran Vaz N.P. and was prescribed antibiotic. She did have palpable lymph nodes and a CT scan of the soft tissue neck was performed on 10/11/18 revealing asymmetric enlargement of the right palatine tonsil. There was right jugular digastric and posterior triangle adenopathy. The largest lymph node within the posterior triangle measured approximately 3.6 x 3.2 x 2.0 cm. She was referred to Sly Rosario M.D. and was seen by him in initial consultation on 18 where laryngoscopy revealed right tonsillar mass. FNA was performed on in on 10/31/18 with specimen sent for pathology, but routine staining was not performed. Flow cytometry revealed a CD10 positive monoclonal B-cell population which by light scatter analysis fell into both the small cell and large cell regions, though predominantly into the large cell region. FISH analysis for BCL2, BCL6 and MYC were negative. Though Dr. Rosario had requested routine staining of the specimen, this was not performed and Pathology had wanted a more fresh specimen, prior to which she was referred to me for consultation. The patient today is denying any weight loss. She does complain of low-grade fevers controlled with Aspirin and claims to have had night sweats on and off for 10 years.   • 18 - [facility] cell non-Hodgkin's lymphoma of the right tonsil on  referral by Sly Rosario M.D. WBC 10.9 with 69% neutrophils, 23% lymphocytes, 6% monocytes, hemoglobin 13.8, platelet count 408,000.  ().  Globulin 4.1 (2.5-3.8), uric acid 5.0 (2.6-8.0), ESR 61 (0-30).      • 11/16/18 - Bone marrow aspiration and biopsy with normocellular marrow with maturing trilineage hematopoiesis. Immunohistochemistry had no increase in CD34 positive blasts or CD20 positive B-lymphocytes. Flow cytometry had no evidence of an abnormal cell type. Cytogenetics revealed 48 XX normal female karyotype. Echocardiogram with mild mitral and mild tricuspid regurgitation, moderate concentric left ventricular hypertrophy and LV systolic function normal with EF about 65-70%.   • 11/17/18 - Echocardiogram with mild mitral and tricuspid regurgitation, moderate concentric left ventricular hypertrophy, LV systolic function normal with EF about 65-70%.   • 11/19/18 - Chest x-ray with clear lungs.   • 11/20/18 - PET scan with extensive right neck lymphadenopathy beginning at the right tonsillar pillar and continuing to the supraclavicular region with SUV between 26 and 16 with extremely hypermetabolic lymph nodes, changes of cholelithiasis and old healed granulomatous disease. Mass of the nodes at L2 are approximately 6.5 x 4.2 cm at the level 3 adenopathy is also seen over an area of 5.5 x 2.8 submandibular gland and sternocleidomastoid muscle are poorly-delineated in the adenopathy. Supraclavicular lymph node is seen measuring over 24 mm. No hypermetabolic activity in the abdomen. Mild infrarenal abdominal aortic aneurysm measuring 28 mm.   • 11/29/18 - Patient underwent Infusaport placement by Ashish Mcginnis M.D.   • 11/30/18 - WBC 8.5, hemoglobin 12.7, platelet count 358,000. Discussed workup results. Patient started on Allopurinol 300 mg p.o. daily with repeat tonsillar biopsy planned for histology. SPEP with hypoalbuminemia. Hepatitis B core antibody surface antigen, hepatis C antibody all  non-reactive.   • 12/19/18 - Patient underwent ultrasound-guided needle biopsy of right neck mass by Ashish Funk M.D. with pathology revealing diffuse large B-cell lymphoma germinal center type. Flow cytometry revealed CD10 positive B-cell lymphoma. The lymphoma was CD20 and surface kappa and lambda chain positive.   • 12/27/18 - Chemotherapy orders written for R-CHOP. Rituximab 375 mg/M2 IV day 1, Cyclophosphamide 750 mg/M2 IV day 1, Doxorubicin 50 mg/M2 IV day 1, Vincristine 1.4 mg/M2 IV day 1 with a max of 2 mg dose and Prednisone 100 mg p.o. days 3-8 to cycle every 21 days for about six cycles in a curative intent. Neulasta Onpro also ordered.    • 1/3/19 - Discussed results of workup and chemotherapy.   • 1/10/19 - Cycle 1 chemotherapy given.   • 1/17/19 - WBC 2.1 with 28% neutrophils, 49% lymphocytes, 8% monocytes, hemoglobin 12.9, platelet count 234,000.   • 1/22/19 - Patient tolerating chemotherapy well. Sed rate 49 (0-30).  ESR 49 (H).   • 1/31/19 - Cycle 2 R-CHOP initiated with Neulasta support. Sed rate 81 (0-30).  ESR 81 (H). Uric acid 2.4 (L). Sodium 133 (L).   • 2/7/19 - Labs at [facility] with verbal report of WBC 1.5 and ANC 0.41. Cipro 500 mg p.o. b.i.d. x7 days as prophylaxis prescribed.   • 2/12/19 - Patient tolerating chemotherapy overall well to date with some expected taste changes and constipation. Patient advised she may use MiraLAX p.r.n. and patient advised to play with diet as well use of sour candies or mints to stimulate the taste buds. A brief period of neutropenia without febrile neutropenia noted during the last cycle with Neulasta given. Allopurinol discontinued.   • 2/21/19 - Ferritin 121 (N), iron 18 (L),  (N), iron saturation 6% (L). Prescribed Ferrous Sulfate 325 mg daily. Received cycle 3 day 1 R-CHOP. Port will not draw. Activase utilized and started on Coumadin 1 mg p.o. daily.   • 2/28/19 - Stool heme negative x3. CT neck and chest with contrast showed  previously-identified mass centered with right palatine tonsils appears to have resolved. Previously-identified right cervical lymphadenopathy has resolved. No evidence of lymphadenopathy within the chest. 4 mm left upper lobe nodule is stable from prior PET CT.  This is indeterminate. Recommend attention to follow up. Echocardiogram showed aortic valve sclerosis, EF 65-70%. No pericardial effusion.   • 3/6/19 - Discussed with the patient the option of either continuing with chemotherapy for a total of six cycles versus stopping chemotherapy and proceeding with radiation to her site of disease. Patient would like to complete planned chemotherapy course.   • 3/6/19 - PT 11.8, INR 1.0.  Patient continued on Coumadin 1 mg p.o. daily for port malfunction.  ESR 64, high.    • 3/14/19 - Cycle 4 chemotherapy given.   • 4/4/19 - Cycle 5 chemotherapy given.   • 4/16/19 - Patient complains of upper respiratory symptoms which are improving on Cipro.   • 4/25/19 - Cycle 6 chemotherapy given. Urinalysis showed trace heme.   • 5/5/19 - . Prescribed Ciprofloxacin 500 mg p.o. b.i.d. x7 days.   • 5/8/19 - Echocardiogram 60-65% ejection fraction with mild mitral and tricuspid regurgitation. CT soft tissue neck with no acute process or adenopathy seen. CT chest, abdomen and pelvis showed no acute cardiopulmonary process, no suspicious lymphadenopathy. Previously described 4 mm nodule in the left upper lobe is likely an intrafissural lymph node. No acute intraabdominal or intrapelvic process. No suspicious lymphadenopathy. Atherosclerosis with infrarenal abdominal aortic ectasia/aneurysm with mural thrombus formation. Coumadin increased to 5 mg p.o. Q.PM.  • 8/8/2019 Coumadin dose decreased to 1 mg p.o. daily for Segvaq-i-Ewso maintenance. Sed rate 61 (0-30).  ().  • 11/1/19 - CT soft tissue neck showed no evidence of recurrent or metastatic disease in the neck in this patient with a history of lymphoma. No acute  findings. CT chest abdomen and pelvis showed No convincing evidence of recurrent or metastatic disease in the chest, abdomen or pelvis. Stable 3.2 cm fusiform infrarenal abdominal aortic aneurysm with 2% luminal stenosis secondary to atherosclerotic plaquing, and irregular ulcerated plaquing. Stable 4 mm noncalcified left upper lobe pulmonary nodule since 11/20/2018. Benign etiology is favored. This may represent a noncalcified granuloma, given the extensive granulomatous changes elsewhere within the chest.  Uncomplicated cholelithiasis.  • 5/12/2020: CT soft tissue neck with contrast, CT chest with contrast: No evidence of lymphadenopathy within the neck or chest.  Stable small nodule in the right breast likely benign.  Recommend correlation with mammography.  • 8/3/2020: Bilateral 3D mammogram: BI-RADS 2 benign.  No evidence of malignancy.  Will circumscribed masses within both breasts, more numerous in the left breast, appear relatively stable from prior mammograms from 2014.   • 9/1/2020   • 11/23/2020 No convincing evidence of recurrent disease or metastatic disease in the chest, abdomen, or pelvis. A 4 mm noncalcified left upper lobe nodule is stable since the most remote available PET/CT from 11/20/2018. This confirms over 2 years of stability and benignity.  Stable 3.2 cm fusiform infrarenal abdominal aortic aneurysm with irregular and eccentric mural thrombus.  Uncomplicated cholelithiasis.   • 6/3/2021: WBC 10.02, hemoglobin 14.2, platelets 329      2.   Iron deficiency anemia diagnosed February 2018.   • 1/31/19 - Hemoglobin 11.9, MCV 87.9 and RDW 14.9.   • 2/21/19 - Ferritin 121 (N), iron 18 (L),  (N), iron saturation 6 (L). Prescribed Ferrous Sulfate 325 mg one tablet p.o. daily.   • 2/28/19 - Stool heme negative x3.   • 3/26/19 - Patient reports she is not taking ferrous sulfate daily as prescribed.  She chose to increase her intake of red meat.  Instructed to start taking ferrous sulfate  325 mg p.o. daily.  Explained rationale.   • 5/14/19 - 160 (). Iron 17 (). TIBC 283 (228-428). Iron saturation 6 (15-50).  Ferritin 160.  • 8/8/2019 hemoglobin 13.2, MCV 82. Ferritin 72 ().   • 5/21/2020: Ferritin 107.3, iron saturation 17 low, serum iron 57, TIBC 332, , creatinine 0.55, LFTs normal, WBC 8.9, hemoglobin 14.6, platelets 341, MCV 90.6  • 12/3/2020 WBC 10.1, Hgb 14.5, Plts 336    3.   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   • 5/9/19 - Prescribed Coumadin 5 mg p.o. daily. Referred to Dr. Duckworth for evaluation of infrarenal aortic aneurysm and mural thrombus seen on CT abdomen and pelvis.   • 5/14/19 - INR 1.4. Increased Coumadin to 6 mg p.o. daily. Follow INR protocol. INR’s to be done at [facility]. Prescription given.  • 8/8/2019 patient claims to have been seen by Dr. Duckworth and advised to stop warfarin.    Past Medical History:   Diagnosis Date   • Hypertension 1998     No past surgical history on file.      Current Outpatient Medications:   •  amLODIPine (NORVASC) 2.5 MG tablet, , Disp: , Rfl:   •  B Complex Vitamins (VITAMIN B COMPLEX 100 IJ), Inject  as directed., Disp: , Rfl:   •  Calcium-Magnesium-Vitamin D (CALCIUM 500 PO), Take  by mouth Daily., Disp: , Rfl:   •  Cholecalciferol (VITAMIN D) 2000 units capsule, Take  by mouth Daily., Disp: , Rfl:   •  Loratadine (CLARITIN) 10 MG capsule, Take  by mouth Daily., Disp: , Rfl:   •  Potassium 99 MG tablet, Take  by mouth Daily., Disp: , Rfl:   •  warfarin (COUMADIN) 1 MG tablet, Take 1 tablet by mouth Daily., Disp: 30 tablet, Rfl: 3  No current facility-administered medications for this visit.    Facility-Administered Medications Ordered in Other Visits:   •  heparin injection 500 Units, 500 Units, Intravenous, PRN, Madan Lee MD  •  sodium chloride 0.9 % flush 20 mL, 20 mL, Intravenous, PRN, Madan Lee MD    No Known Allergies    Family History   Problem Relation Age of  "Onset   • Kidney cancer Father 66   • Leukemia Brother 48   • Multiple myeloma Brother 47     Cancer-related family history includes Kidney cancer (age of onset: 66) in her father.    Social History     Tobacco Use   • Smoking status: Current Every Day Smoker     Packs/day: 0.50     Years: 10.00     Pack years: 5.00     Types: Cigarettes   • Smokeless tobacco: Never Used   • Tobacco comment: started smoking when she was in her 20’s   Substance Use Topics   • Alcohol use: No   • Drug use: No     I have reviewed the history of present illness, past medical history, family history, social history, lab results, all notes and other records since the patient was last seen on 8/8/19.     SUBJECTIVE:   Patient is here for follow up of right tonsillar diffuse large B-cell lymphoma.  She denies any fevers chills night sweats weight loss or lymph node swelling.  She performs monthly breast exams.  No lymphadenopathy.  Discussed moving cessation with the patient.  She will consider smoking cessation but not right now.  Taking iron 1 tablet daily..        REVIEW OF SYSTEMS:      OBJECTIVE:    Vitals:    06/03/21 1322   BP: 169/88   Pulse: 93   Resp: 18   Temp: 97.3 °F (36.3 °C)   Weight: 75.8 kg (167 lb)   Height: 165.1 cm (65\")   PainSc: 0-No pain     ECOG  (0) Fully active, able to carry on all predisease performance without restriction    Physical Exam   Constitutional: She is oriented to person, place, and time. She appears well-developed. No distress.   HENT:   Head: Normocephalic and atraumatic.   Eyes: Conjunctivae are normal. Right eye exhibits no discharge. Left eye exhibits no discharge. No scleral icterus.   Neck: No thyromegaly present.   Cardiovascular: Normal rate, regular rhythm and normal heart sounds. Exam reveals no gallop and no friction rub.   Pulmonary/Chest: Effort normal. No stridor. No respiratory distress. She has no wheezes.   Left chest wall port..  No palpable mass on breast exam.  Slightly decreased " air entry in the lungs.   Abdominal: Soft. Bowel sounds are normal. She exhibits no mass. There is no abdominal tenderness. There is no rebound and no guarding.   Musculoskeletal: Normal range of motion. No tenderness.      Comments: Degenerative changes.    Lymphadenopathy:     She has no cervical adenopathy.   Neurological: She is alert and oriented to person, place, and time. She exhibits normal muscle tone.   Skin: Skin is warm. No rash noted. She is not diaphoretic. No erythema.   Psychiatric: Her behavior is normal.   Nursing note and vitals reviewed.     I have reexamined the patient and the results are consistent with the previously documented exam. Madan Lee MD       RECENT LABS  Lab Results - Last 18 Months   Lab Units 06/03/21  1318 12/03/20  1407 09/01/20  1446   WBC 10*3/mm3 10.02 10.16 8.44   HEMOGLOBIN g/dL 14.2 14.5 14.0   HEMATOCRIT % 43.3 44.3 41.4   PLATELETS 10*3/mm3 329 336 323   MCV fL 92.1 91.3 90.4     Lab Results - Last 18 Months   Lab Units 11/23/20  1052 09/01/20  1446 05/21/20  1524   SODIUM mmol/L  --  139 140   POTASSIUM mmol/L  --  3.5 3.6   CHLORIDE mmol/L  --  101 103   CO2 mmol/L  --  29.0 25.0   BUN   --  8 6*   CREATININE mg/dL 0.70 0.64 0.55*   CALCIUM mg/dL  --  9.0 8.6   BILIRUBIN mg/dL  --  0.2 0.2   ALK PHOS U/L  --  103 94   ALT (SGPT) U/L  --  16 17   AST (SGOT) U/L  --  18 19   GLUCOSE mg/dL  --  103* 94     Lab Results   Component Value Date    GLUCOSE 103 (H) 09/01/2020    BUN  09/01/2020      Comment:      Testing performed by alternate method    BUN 8 09/01/2020    CREATININE 0.70 11/23/2020    EGFRIFNONA 94 09/01/2020    BCR  09/01/2020      Comment:      Testing not performed    K 3.5 09/01/2020    CO2 29.0 09/01/2020    CALCIUM 9.0 09/01/2020    ALBUMIN 3.80 09/01/2020    LABIL2 0.8 (L) 04/25/2019    AST 18 09/01/2020    ALT 16 09/01/2020     Lab Results   Component Value Date    IRON 57 05/21/2020    TIBC 332 05/21/2020    FERRITIN 107.30 05/21/2020     No  results found for: FOLATE  No results found for: OCCULTBLD  No results found for: RETICCTPCT  No results found for: PAKFRZLJ20, TSH  No results found for: SPEP, UPEP  LDH   Date Value Ref Range Status   09/01/2020 135 135 - 214 U/L Final     Uric Acid   Date Value Ref Range Status   01/31/2019 2.4 (L) 2.6 - 8.0 mg/dL Final     Lab Results   Component Value Date    SEDRATE 61 (H) 08/08/2019     No results found for: FIBRINOGEN, HAPTOGLOBIN, DDIMER  No results found for: DDIMER  Lab Results   Component Value Date    INR 2.30 08/06/2019     No results found for:   No results found for: CEA  No results found for:   No results found for: PSA  No results found for: [id number]    Assessment/Plan     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 IPI 1 low  - CBC & Differential      ASSESSMENT:    1. Diffuse large B-cell lymphoma of lymph nodes of neck (CMS/HCC): Status post 6 cycles of R-CHOP  finished 4/25/2019: Restaging CT scan 5/12/2020 does not show any evidence of lymphadenopathy in the neck chest.  Restaging CT scan on 11/23/2020 negative for recurrence.  Clinically she has no signs of disease recurrence  2. Encounter for care related to vascular access port   3. Iron deficiency anemia due to chronic blood loss:  taking oral iron.  Hemoglobin normal.  4. Tobacco use  5. Aortic mural thrombus   6. Right breast nodule seen on recent CT scan.  No palpable abnormality.  Mammogram normal on 8/3/2020      PLAN:      · Patient needs restaging scan.  We will schedule.  She will call us back for results.  · Check iron levels today.  CMP, LDH today.   · CBC CMP LDH in 6 months  · Arrange for Port-A-Cath removal at the scan is negative  · Continue  monthly self breast exams.  · Recommended monthly breast exams..    · Follow-up in 6 months           Thank you very much for providing the opportunity to participate in this patient’s care. Please do not hesitate to call if there are any other questions.      I have  reviewed and confirmed the accuracy of the patient's history: Chief complaint, HPI, ROS and Subjective as entered by the MA/LPN/RN. Madan Lee MD 06/03/21         Electronically signed by Madan Lee MD, 06/03/21, 2:19 PM EDT.

## 2021-06-03 ENCOUNTER — HOSPITAL ENCOUNTER (OUTPATIENT)
Dept: ONCOLOGY | Facility: HOSPITAL | Age: 66
Setting detail: INFUSION SERIES
Discharge: HOME | End: 2021-06-03

## 2021-06-03 ENCOUNTER — OFFICE VISIT (OUTPATIENT)
Dept: ONCOLOGY | Facility: CLINIC | Age: 66
End: 2021-06-03

## 2021-06-03 VITALS
HEIGHT: 65 IN | BODY MASS INDEX: 27.82 KG/M2 | TEMPERATURE: 97.3 F | HEART RATE: 93 BPM | WEIGHT: 167 LBS | DIASTOLIC BLOOD PRESSURE: 88 MMHG | SYSTOLIC BLOOD PRESSURE: 169 MMHG | RESPIRATION RATE: 18 BRPM

## 2021-06-03 DIAGNOSIS — C83.31 DIFFUSE LARGE B-CELL LYMPHOMA OF LYMPH NODES OF NECK (HCC): Primary | ICD-10-CM

## 2021-06-03 DIAGNOSIS — Z45.2 ENCOUNTER FOR FITTING AND ADJUSTMENT OF VASCULAR CATHETER: Primary | ICD-10-CM

## 2021-06-03 DIAGNOSIS — C83.31 DIFFUSE LARGE B-CELL LYMPHOMA OF LYMPH NODES OF NECK (HCC): ICD-10-CM

## 2021-06-03 LAB
ALBUMIN SERPL-MCNC: 3.9 G/DL (ref 3.5–5.2)
ALBUMIN/GLOB SERPL: 1.2 G/DL
ALP SERPL-CCNC: 103 U/L (ref 39–117)
ALT SERPL W P-5'-P-CCNC: 12 U/L (ref 1–33)
ANION GAP SERPL CALCULATED.3IONS-SCNC: 12 MMOL/L (ref 5–15)
AST SERPL-CCNC: 16 U/L (ref 1–32)
BASOPHILS # BLD AUTO: 0.02 10*3/MM3 (ref 0–0.2)
BASOPHILS NFR BLD AUTO: 0.2 % (ref 0–1.5)
BILIRUB SERPL-MCNC: 0.3 MG/DL (ref 0–1.2)
BUN SERPL-MCNC: 7 MG/DL (ref 8–23)
BUN/CREAT SERPL: 11.7 (ref 7–25)
CALCIUM SPEC-SCNC: 9.4 MG/DL (ref 8.6–10.5)
CHLORIDE SERPL-SCNC: 102 MMOL/L (ref 98–107)
CO2 SERPL-SCNC: 25 MMOL/L (ref 22–29)
CREAT SERPL-MCNC: 0.6 MG/DL (ref 0.57–1)
DEPRECATED RDW RBC AUTO: 44.3 FL (ref 37–54)
EOSINOPHIL # BLD AUTO: 0.27 10*3/MM3 (ref 0–0.4)
EOSINOPHIL NFR BLD AUTO: 2.7 % (ref 0.3–6.2)
ERYTHROCYTE [DISTWIDTH] IN BLOOD BY AUTOMATED COUNT: 13.4 % (ref 12.3–15.4)
FERRITIN SERPL-MCNC: 128.7 NG/ML (ref 13–150)
GFR SERPL CREATININE-BSD FRML MDRD: 101 ML/MIN/1.73
GLOBULIN UR ELPH-MCNC: 3.2 GM/DL
GLUCOSE SERPL-MCNC: 100 MG/DL (ref 65–99)
HCT VFR BLD AUTO: 43.3 % (ref 34–46.6)
HGB BLD-MCNC: 14.2 G/DL (ref 12–15.9)
IRON 24H UR-MRATE: 57 MCG/DL (ref 37–145)
IRON SATN MFR SERPL: 16 % (ref 20–50)
LDH SERPL-CCNC: 142 U/L (ref 135–214)
LYMPHOCYTES # BLD AUTO: 2.43 10*3/MM3 (ref 0.7–3.1)
LYMPHOCYTES NFR BLD AUTO: 24.3 % (ref 19.6–45.3)
MCH RBC QN AUTO: 30.2 PG (ref 26.6–33)
MCHC RBC AUTO-ENTMCNC: 32.8 G/DL (ref 31.5–35.7)
MCV RBC AUTO: 92.1 FL (ref 79–97)
MONOCYTES # BLD AUTO: 0.79 10*3/MM3 (ref 0.1–0.9)
MONOCYTES NFR BLD AUTO: 7.9 % (ref 5–12)
NEUTROPHILS NFR BLD AUTO: 6.51 10*3/MM3 (ref 1.7–7)
NEUTROPHILS NFR BLD AUTO: 64.9 % (ref 42.7–76)
PLATELET # BLD AUTO: 329 10*3/MM3 (ref 140–450)
PMV BLD AUTO: 9.8 FL (ref 6–12)
POTASSIUM SERPL-SCNC: 3.8 MMOL/L (ref 3.5–5.2)
PROT SERPL-MCNC: 7.1 G/DL (ref 6–8.5)
RBC # BLD AUTO: 4.7 10*6/MM3 (ref 3.77–5.28)
SODIUM SERPL-SCNC: 139 MMOL/L (ref 136–145)
TIBC SERPL-MCNC: 352 MCG/DL (ref 298–536)
TRANSFERRIN SERPL-MCNC: 236 MG/DL (ref 200–360)
WBC # BLD AUTO: 10.02 10*3/MM3 (ref 3.4–10.8)

## 2021-06-03 PROCEDURE — 85025 COMPLETE CBC W/AUTO DIFF WBC: CPT | Performed by: INTERNAL MEDICINE

## 2021-06-03 PROCEDURE — 25010000002 HEPARIN LOCK FLUSH PER 10 UNITS: Performed by: INTERNAL MEDICINE

## 2021-06-03 PROCEDURE — 83540 ASSAY OF IRON: CPT | Performed by: INTERNAL MEDICINE

## 2021-06-03 PROCEDURE — 83615 LACTATE (LD) (LDH) ENZYME: CPT | Performed by: INTERNAL MEDICINE

## 2021-06-03 PROCEDURE — 84466 ASSAY OF TRANSFERRIN: CPT | Performed by: INTERNAL MEDICINE

## 2021-06-03 PROCEDURE — 99214 OFFICE O/P EST MOD 30 MIN: CPT | Performed by: INTERNAL MEDICINE

## 2021-06-03 PROCEDURE — 82728 ASSAY OF FERRITIN: CPT | Performed by: INTERNAL MEDICINE

## 2021-06-03 PROCEDURE — 36591 DRAW BLOOD OFF VENOUS DEVICE: CPT

## 2021-06-03 PROCEDURE — 80053 COMPREHEN METABOLIC PANEL: CPT | Performed by: INTERNAL MEDICINE

## 2021-06-03 RX ORDER — HEPARIN SODIUM (PORCINE) LOCK FLUSH IV SOLN 100 UNIT/ML 100 UNIT/ML
500 SOLUTION INTRAVENOUS AS NEEDED
Status: DISCONTINUED | OUTPATIENT
Start: 2021-06-03 | End: 2021-06-04 | Stop reason: HOSPADM

## 2021-06-03 RX ORDER — SODIUM CHLORIDE 0.9 % (FLUSH) 0.9 %
20 SYRINGE (ML) INJECTION AS NEEDED
Status: CANCELLED | OUTPATIENT
Start: 2021-06-03

## 2021-06-03 RX ORDER — HEPARIN SODIUM (PORCINE) LOCK FLUSH IV SOLN 100 UNIT/ML 100 UNIT/ML
500 SOLUTION INTRAVENOUS AS NEEDED
Status: CANCELLED | OUTPATIENT
Start: 2021-06-03

## 2021-06-03 RX ORDER — SODIUM CHLORIDE 0.9 % (FLUSH) 0.9 %
20 SYRINGE (ML) INJECTION AS NEEDED
Status: DISCONTINUED | OUTPATIENT
Start: 2021-06-03 | End: 2021-06-04 | Stop reason: HOSPADM

## 2021-06-03 RX ADMIN — Medication 30 ML: at 13:18

## 2021-06-03 RX ADMIN — HEPARIN 500 UNITS: 100 SYRINGE at 14:44

## 2021-06-07 RX ORDER — DOXYCYCLINE HYCLATE 50 MG/1
324 CAPSULE, GELATIN COATED ORAL
Qty: 30 TABLET | Refills: 1 | Status: SHIPPED | OUTPATIENT
Start: 2021-06-07 | End: 2021-07-19

## 2021-06-07 RX ORDER — DOXYCYCLINE HYCLATE 50 MG/1
324 CAPSULE, GELATIN COATED ORAL
Qty: 30 TABLET | Refills: 1 | Status: CANCELLED | OUTPATIENT
Start: 2021-06-07

## 2021-06-07 NOTE — TELEPHONE ENCOUNTER
Left message to notify patient she needs to take oral iron daily, the prescription has been sent for DR. Lee to sign.

## 2021-06-07 NOTE — TELEPHONE ENCOUNTER
----- Message from Madan Lee MD sent at 6/6/2021  1:06 PM EDT -----  Needs oral iron 1 tablet daily

## 2021-06-17 ENCOUNTER — HOSPITAL ENCOUNTER (OUTPATIENT)
Dept: PET IMAGING | Facility: HOSPITAL | Age: 66
Discharge: HOME | End: 2021-06-17
Admitting: INTERNAL MEDICINE

## 2021-06-17 DIAGNOSIS — C83.31 DIFFUSE LARGE B-CELL LYMPHOMA OF LYMPH NODES OF NECK (HCC): ICD-10-CM

## 2021-06-17 PROCEDURE — 74177 CT ABD & PELVIS W/CONTRAST: CPT

## 2021-06-17 PROCEDURE — 0 IOPAMIDOL PER 1 ML: Performed by: INTERNAL MEDICINE

## 2021-06-17 PROCEDURE — 71260 CT THORAX DX C+: CPT

## 2021-06-17 RX ADMIN — IOPAMIDOL 100 ML: 755 INJECTION, SOLUTION INTRAVENOUS at 12:42

## 2021-07-15 ENCOUNTER — HOSPITAL ENCOUNTER (OUTPATIENT)
Dept: ONCOLOGY | Facility: HOSPITAL | Age: 66
Setting detail: INFUSION SERIES
Discharge: HOME | End: 2021-07-15

## 2021-07-15 VITALS — HEIGHT: 65 IN | BODY MASS INDEX: 27.46 KG/M2 | WEIGHT: 164.8 LBS

## 2021-07-15 DIAGNOSIS — C83.31 DIFFUSE LARGE B-CELL LYMPHOMA OF LYMPH NODES OF NECK (HCC): ICD-10-CM

## 2021-07-15 DIAGNOSIS — Z45.2 ENCOUNTER FOR CARE RELATED TO VASCULAR ACCESS PORT: Primary | ICD-10-CM

## 2021-07-15 PROCEDURE — 96523 IRRIG DRUG DELIVERY DEVICE: CPT

## 2021-07-15 PROCEDURE — 25010000002 HEPARIN LOCK FLUSH PER 10 UNITS: Performed by: INTERNAL MEDICINE

## 2021-07-15 RX ORDER — HEPARIN SODIUM (PORCINE) LOCK FLUSH IV SOLN 100 UNIT/ML 100 UNIT/ML
500 SOLUTION INTRAVENOUS AS NEEDED
Status: CANCELLED | OUTPATIENT
Start: 2021-07-15

## 2021-07-15 RX ORDER — HEPARIN SODIUM (PORCINE) LOCK FLUSH IV SOLN 100 UNIT/ML 100 UNIT/ML
500 SOLUTION INTRAVENOUS AS NEEDED
Status: DISCONTINUED | OUTPATIENT
Start: 2021-07-15 | End: 2021-07-16 | Stop reason: HOSPADM

## 2021-07-15 RX ORDER — SODIUM CHLORIDE 0.9 % (FLUSH) 0.9 %
20 SYRINGE (ML) INJECTION AS NEEDED
Status: CANCELLED | OUTPATIENT
Start: 2021-07-15

## 2021-07-15 RX ORDER — SODIUM CHLORIDE 0.9 % (FLUSH) 0.9 %
20 SYRINGE (ML) INJECTION AS NEEDED
Status: DISCONTINUED | OUTPATIENT
Start: 2021-07-15 | End: 2021-07-16 | Stop reason: HOSPADM

## 2021-07-15 RX ADMIN — Medication 30 ML: at 14:17

## 2021-07-15 RX ADMIN — HEPARIN 500 UNITS: 100 SYRINGE at 14:19

## 2021-07-19 RX ORDER — DOXYCYCLINE HYCLATE 50 MG/1
CAPSULE, GELATIN COATED ORAL
Qty: 30 TABLET | Refills: 1 | Status: SHIPPED | OUTPATIENT
Start: 2021-07-19 | End: 2023-02-02

## 2021-08-26 ENCOUNTER — HOSPITAL ENCOUNTER (OUTPATIENT)
Dept: ONCOLOGY | Facility: HOSPITAL | Age: 66
Setting detail: INFUSION SERIES
Discharge: HOME | End: 2021-08-26

## 2021-08-26 VITALS — WEIGHT: 162.6 LBS | BODY MASS INDEX: 27.09 KG/M2 | HEIGHT: 65 IN

## 2021-08-26 DIAGNOSIS — Z45.2 ENCOUNTER FOR CARE RELATED TO VASCULAR ACCESS PORT: Primary | ICD-10-CM

## 2021-08-26 DIAGNOSIS — C83.31 DIFFUSE LARGE B-CELL LYMPHOMA OF LYMPH NODES OF NECK (HCC): ICD-10-CM

## 2021-08-26 PROCEDURE — 25010000002 HEPARIN LOCK FLUSH PER 10 UNITS: Performed by: INTERNAL MEDICINE

## 2021-08-26 PROCEDURE — 96523 IRRIG DRUG DELIVERY DEVICE: CPT

## 2021-08-26 RX ORDER — HEPARIN SODIUM (PORCINE) LOCK FLUSH IV SOLN 100 UNIT/ML 100 UNIT/ML
500 SOLUTION INTRAVENOUS AS NEEDED
Status: DISCONTINUED | OUTPATIENT
Start: 2021-08-26 | End: 2021-08-27 | Stop reason: HOSPADM

## 2021-08-26 RX ORDER — SODIUM CHLORIDE 0.9 % (FLUSH) 0.9 %
20 SYRINGE (ML) INJECTION AS NEEDED
Status: DISCONTINUED | OUTPATIENT
Start: 2021-08-26 | End: 2021-08-27 | Stop reason: HOSPADM

## 2021-08-26 RX ORDER — SODIUM CHLORIDE 0.9 % (FLUSH) 0.9 %
20 SYRINGE (ML) INJECTION AS NEEDED
Status: CANCELLED | OUTPATIENT
Start: 2021-08-26

## 2021-08-26 RX ORDER — HEPARIN SODIUM (PORCINE) LOCK FLUSH IV SOLN 100 UNIT/ML 100 UNIT/ML
500 SOLUTION INTRAVENOUS AS NEEDED
Status: CANCELLED | OUTPATIENT
Start: 2021-08-26

## 2021-08-26 RX ADMIN — HEPARIN 500 UNITS: 100 SYRINGE at 14:05

## 2021-08-26 RX ADMIN — Medication 30 ML: at 14:03

## 2021-09-30 ENCOUNTER — HOSPITAL ENCOUNTER (OUTPATIENT)
Dept: ONCOLOGY | Facility: HOSPITAL | Age: 66
Setting detail: INFUSION SERIES
Discharge: HOME | End: 2021-09-30

## 2021-09-30 VITALS — BODY MASS INDEX: 27.29 KG/M2 | HEIGHT: 65 IN | WEIGHT: 163.8 LBS

## 2021-09-30 DIAGNOSIS — C83.31 DIFFUSE LARGE B-CELL LYMPHOMA OF LYMPH NODES OF NECK (HCC): ICD-10-CM

## 2021-09-30 DIAGNOSIS — Z45.2 ENCOUNTER FOR CARE RELATED TO VASCULAR ACCESS PORT: Primary | ICD-10-CM

## 2021-09-30 PROCEDURE — 96523 IRRIG DRUG DELIVERY DEVICE: CPT

## 2021-09-30 PROCEDURE — 25010000002 HEPARIN LOCK FLUSH PER 10 UNITS: Performed by: INTERNAL MEDICINE

## 2021-09-30 RX ORDER — SODIUM CHLORIDE 0.9 % (FLUSH) 0.9 %
20 SYRINGE (ML) INJECTION AS NEEDED
Status: CANCELLED | OUTPATIENT
Start: 2021-09-30

## 2021-09-30 RX ORDER — HEPARIN SODIUM (PORCINE) LOCK FLUSH IV SOLN 100 UNIT/ML 100 UNIT/ML
500 SOLUTION INTRAVENOUS AS NEEDED
Status: DISCONTINUED | OUTPATIENT
Start: 2021-09-30 | End: 2021-10-01 | Stop reason: HOSPADM

## 2021-09-30 RX ORDER — HEPARIN SODIUM (PORCINE) LOCK FLUSH IV SOLN 100 UNIT/ML 100 UNIT/ML
500 SOLUTION INTRAVENOUS AS NEEDED
Status: CANCELLED | OUTPATIENT
Start: 2021-09-30

## 2021-09-30 RX ORDER — SODIUM CHLORIDE 0.9 % (FLUSH) 0.9 %
20 SYRINGE (ML) INJECTION AS NEEDED
Status: DISCONTINUED | OUTPATIENT
Start: 2021-09-30 | End: 2021-10-01 | Stop reason: HOSPADM

## 2021-09-30 RX ADMIN — HEPARIN 500 UNITS: 100 SYRINGE at 14:30

## 2021-09-30 RX ADMIN — Medication 30 ML: at 14:28

## 2021-11-04 ENCOUNTER — HOSPITAL ENCOUNTER (OUTPATIENT)
Dept: ONCOLOGY | Facility: HOSPITAL | Age: 66
Setting detail: INFUSION SERIES
Discharge: HOME | End: 2021-11-04

## 2021-11-04 VITALS — WEIGHT: 167.3 LBS | BODY MASS INDEX: 27.84 KG/M2

## 2021-11-04 DIAGNOSIS — Z45.2 ENCOUNTER FOR CARE RELATED TO VASCULAR ACCESS PORT: Primary | ICD-10-CM

## 2021-11-04 DIAGNOSIS — C83.31 DIFFUSE LARGE B-CELL LYMPHOMA OF LYMPH NODES OF NECK (HCC): ICD-10-CM

## 2021-11-04 PROCEDURE — 96523 IRRIG DRUG DELIVERY DEVICE: CPT

## 2021-11-04 PROCEDURE — 25010000002 HEPARIN LOCK FLUSH PER 10 UNITS: Performed by: INTERNAL MEDICINE

## 2021-11-04 RX ORDER — HEPARIN SODIUM (PORCINE) LOCK FLUSH IV SOLN 100 UNIT/ML 100 UNIT/ML
500 SOLUTION INTRAVENOUS AS NEEDED
Status: CANCELLED | OUTPATIENT
Start: 2021-11-04

## 2021-11-04 RX ORDER — SODIUM CHLORIDE 0.9 % (FLUSH) 0.9 %
20 SYRINGE (ML) INJECTION AS NEEDED
Status: DISCONTINUED | OUTPATIENT
Start: 2021-11-04 | End: 2021-11-05 | Stop reason: HOSPADM

## 2021-11-04 RX ORDER — HEPARIN SODIUM (PORCINE) LOCK FLUSH IV SOLN 100 UNIT/ML 100 UNIT/ML
500 SOLUTION INTRAVENOUS AS NEEDED
Status: DISCONTINUED | OUTPATIENT
Start: 2021-11-04 | End: 2021-11-05 | Stop reason: HOSPADM

## 2021-11-04 RX ORDER — SODIUM CHLORIDE 0.9 % (FLUSH) 0.9 %
20 SYRINGE (ML) INJECTION AS NEEDED
Status: CANCELLED | OUTPATIENT
Start: 2021-11-04

## 2021-11-04 RX ADMIN — Medication 20 ML: at 14:00

## 2021-11-04 RX ADMIN — HEPARIN 500 UNITS: 100 SYRINGE at 14:00

## 2021-11-04 NOTE — PROGRESS NOTES
Port accessed and flushed with good blood return noted. Port flushed with saline and heparin prior to needle removal.

## 2021-12-09 ENCOUNTER — HOSPITAL ENCOUNTER (OUTPATIENT)
Dept: ONCOLOGY | Facility: HOSPITAL | Age: 66
Setting detail: INFUSION SERIES
Discharge: HOME | End: 2021-12-09

## 2021-12-09 ENCOUNTER — APPOINTMENT (OUTPATIENT)
Dept: LAB | Facility: HOSPITAL | Age: 66
End: 2021-12-09

## 2021-12-09 VITALS — WEIGHT: 168 LBS | HEIGHT: 65 IN | BODY MASS INDEX: 27.99 KG/M2

## 2021-12-09 DIAGNOSIS — C83.31 DIFFUSE LARGE B-CELL LYMPHOMA OF LYMPH NODES OF NECK (HCC): ICD-10-CM

## 2021-12-09 DIAGNOSIS — Z45.2 ENCOUNTER FOR CARE RELATED TO VASCULAR ACCESS PORT: Primary | ICD-10-CM

## 2021-12-09 LAB
ALBUMIN SERPL-MCNC: 3.6 G/DL (ref 3.5–5.2)
ALBUMIN/GLOB SERPL: 1.1 G/DL
ALP SERPL-CCNC: 110 U/L (ref 39–117)
ALT SERPL W P-5'-P-CCNC: 16 U/L (ref 1–33)
ANION GAP SERPL CALCULATED.3IONS-SCNC: 8 MMOL/L (ref 5–15)
AST SERPL-CCNC: 18 U/L (ref 1–32)
BASOPHILS # BLD AUTO: 0.02 10*3/MM3 (ref 0–0.2)
BASOPHILS NFR BLD AUTO: 0.2 % (ref 0–1.5)
BILIRUB SERPL-MCNC: 0.2 MG/DL (ref 0–1.2)
BUN SERPL-MCNC: 9 MG/DL (ref 8–23)
BUN/CREAT SERPL: 12.7 (ref 7–25)
CALCIUM SPEC-SCNC: 9 MG/DL (ref 8.6–10.5)
CHLORIDE SERPL-SCNC: 102 MMOL/L (ref 98–107)
CO2 SERPL-SCNC: 27 MMOL/L (ref 22–29)
CREAT SERPL-MCNC: 0.71 MG/DL (ref 0.57–1)
DEPRECATED RDW RBC AUTO: 45.3 FL (ref 37–54)
EOSINOPHIL # BLD AUTO: 0.25 10*3/MM3 (ref 0–0.4)
EOSINOPHIL NFR BLD AUTO: 2.6 % (ref 0.3–6.2)
ERYTHROCYTE [DISTWIDTH] IN BLOOD BY AUTOMATED COUNT: 14 % (ref 12.3–15.4)
GFR SERPL CREATININE-BSD FRML MDRD: 83 ML/MIN/1.73
GLOBULIN UR ELPH-MCNC: 3.2 GM/DL
GLUCOSE SERPL-MCNC: 100 MG/DL (ref 65–99)
HCT VFR BLD AUTO: 43.9 % (ref 34–46.6)
HGB BLD-MCNC: 14.5 G/DL (ref 12–15.9)
LDH SERPL-CCNC: 133 U/L (ref 135–214)
LYMPHOCYTES # BLD AUTO: 2.17 10*3/MM3 (ref 0.7–3.1)
LYMPHOCYTES NFR BLD AUTO: 22.2 % (ref 19.6–45.3)
MCH RBC QN AUTO: 30 PG (ref 26.6–33)
MCHC RBC AUTO-ENTMCNC: 33 G/DL (ref 31.5–35.7)
MCV RBC AUTO: 90.9 FL (ref 79–97)
MONOCYTES # BLD AUTO: 0.82 10*3/MM3 (ref 0.1–0.9)
MONOCYTES NFR BLD AUTO: 8.4 % (ref 5–12)
NEUTROPHILS NFR BLD AUTO: 6.53 10*3/MM3 (ref 1.7–7)
NEUTROPHILS NFR BLD AUTO: 66.6 % (ref 42.7–76)
PLATELET # BLD AUTO: 327 10*3/MM3 (ref 140–450)
PMV BLD AUTO: 9.9 FL (ref 6–12)
POTASSIUM SERPL-SCNC: 3.6 MMOL/L (ref 3.5–5.2)
PROT SERPL-MCNC: 6.8 G/DL (ref 6–8.5)
RBC # BLD AUTO: 4.83 10*6/MM3 (ref 3.77–5.28)
SODIUM SERPL-SCNC: 137 MMOL/L (ref 136–145)
WBC NRBC COR # BLD: 9.79 10*3/MM3 (ref 3.4–10.8)

## 2021-12-09 PROCEDURE — 25010000002 HEPARIN LOCK FLUSH PER 10 UNITS: Performed by: INTERNAL MEDICINE

## 2021-12-09 PROCEDURE — 85025 COMPLETE CBC W/AUTO DIFF WBC: CPT | Performed by: INTERNAL MEDICINE

## 2021-12-09 PROCEDURE — 83615 LACTATE (LD) (LDH) ENZYME: CPT | Performed by: INTERNAL MEDICINE

## 2021-12-09 PROCEDURE — 80053 COMPREHEN METABOLIC PANEL: CPT | Performed by: INTERNAL MEDICINE

## 2021-12-09 PROCEDURE — 36415 COLL VENOUS BLD VENIPUNCTURE: CPT | Performed by: INTERNAL MEDICINE

## 2021-12-09 PROCEDURE — 36591 DRAW BLOOD OFF VENOUS DEVICE: CPT

## 2021-12-09 RX ORDER — HEPARIN SODIUM (PORCINE) LOCK FLUSH IV SOLN 100 UNIT/ML 100 UNIT/ML
500 SOLUTION INTRAVENOUS AS NEEDED
Status: CANCELLED | OUTPATIENT
Start: 2021-12-09

## 2021-12-09 RX ORDER — SODIUM CHLORIDE 0.9 % (FLUSH) 0.9 %
20 SYRINGE (ML) INJECTION AS NEEDED
Status: DISCONTINUED | OUTPATIENT
Start: 2021-12-09 | End: 2021-12-10 | Stop reason: HOSPADM

## 2021-12-09 RX ORDER — SODIUM CHLORIDE 0.9 % (FLUSH) 0.9 %
20 SYRINGE (ML) INJECTION AS NEEDED
Status: CANCELLED | OUTPATIENT
Start: 2021-12-09

## 2021-12-09 RX ORDER — HEPARIN SODIUM (PORCINE) LOCK FLUSH IV SOLN 100 UNIT/ML 100 UNIT/ML
500 SOLUTION INTRAVENOUS AS NEEDED
Status: DISCONTINUED | OUTPATIENT
Start: 2021-12-09 | End: 2021-12-10 | Stop reason: HOSPADM

## 2021-12-09 RX ADMIN — Medication 30 ML: at 14:06

## 2021-12-09 RX ADMIN — HEPARIN 500 UNITS: 100 SYRINGE at 14:08

## 2021-12-09 NOTE — ADDENDUM NOTE
Encounter addended by: Hellen Heard LPN on: 12/9/2021 2:18 PM   Actions taken: MAR administration edited, MAR administration accepted, Flowsheet accepted, Clinical Note Signed

## 2021-12-16 ENCOUNTER — OFFICE VISIT (OUTPATIENT)
Dept: ONCOLOGY | Facility: CLINIC | Age: 66
End: 2021-12-16

## 2021-12-16 VITALS
WEIGHT: 166.2 LBS | TEMPERATURE: 97.2 F | HEART RATE: 97 BPM | OXYGEN SATURATION: 97 % | RESPIRATION RATE: 18 BRPM | SYSTOLIC BLOOD PRESSURE: 152 MMHG | HEIGHT: 65 IN | BODY MASS INDEX: 27.69 KG/M2 | DIASTOLIC BLOOD PRESSURE: 88 MMHG

## 2021-12-16 DIAGNOSIS — C83.31 DIFFUSE LARGE B-CELL LYMPHOMA OF LYMPH NODES OF NECK (HCC): Primary | ICD-10-CM

## 2021-12-16 PROCEDURE — 99214 OFFICE O/P EST MOD 30 MIN: CPT | Performed by: INTERNAL MEDICINE

## 2021-12-16 NOTE — PROGRESS NOTES
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 Right tonsillar diffuse large B-cell lymphoma diagnosed 2018.  • This  female who claims to have developed a sore throat in 2018. She saw her primary care provider, Tran Vaz N.P. and was prescribed antibiotic. She did have palpable lymph nodes and a CT scan of the soft tissue neck was performed on 10/11/18 revealing asymmetric enlargement of the right palatine tonsil. There was right jugular digastric and posterior triangle adenopathy. The largest lymph node within the posterior triangle measured approximately 3.6 x 3.2 x 2.0 cm. She was referred to Sly Rosario M.D. and was seen by him in initial consultation on 18 where laryngoscopy revealed right tonsillar mass. FNA was performed on in on 10/31/18 with specimen sent for pathology, but routine staining was not performed. Flow cytometry revealed a CD10 positive monoclonal B-cell population which by light scatter analysis fell into both the small cell and large cell regions, though predominantly into the large cell region. FISH analysis for BCL2, BCL6 and MYC were negative. Though Dr. Rosario had requested routine staining of the specimen, this was not performed and Pathology had wanted a more fresh specimen, prior to which she was referred to me for consultation. The patient today is denying any weight loss. She does complain of low-grade fevers controlled with Aspirin and claims to have had night sweats on and off for 10 years.   • 18 - [facility] cell non-Hodgkin's lymphoma of the right tonsil on referral by  Sly Rosario M.D. WBC 10.9 with 69% neutrophils, 23% lymphocytes, 6% monocytes, hemoglobin 13.8, platelet count 408,000.  ().  Globulin 4.1 (2.5-3.8), uric acid 5.0 (2.6-8.0), ESR 61 (0-30).      • 11/16/18 - Bone marrow aspiration and biopsy with normocellular marrow with maturing trilineage hematopoiesis. Immunohistochemistry had no increase in CD34 positive blasts or CD20 positive B-lymphocytes. Flow cytometry had no evidence of an abnormal cell type. Cytogenetics revealed 48 XX normal female karyotype. Echocardiogram with mild mitral and mild tricuspid regurgitation, moderate concentric left ventricular hypertrophy and LV systolic function normal with EF about 65-70%.   • 11/17/18 - Echocardiogram with mild mitral and tricuspid regurgitation, moderate concentric left ventricular hypertrophy, LV systolic function normal with EF about 65-70%.   • 11/19/18 - Chest x-ray with clear lungs.   • 11/20/18 - PET scan with extensive right neck lymphadenopathy beginning at the right tonsillar pillar and continuing to the supraclavicular region with SUV between 26 and 16 with extremely hypermetabolic lymph nodes, changes of cholelithiasis and old healed granulomatous disease. Mass of the nodes at L2 are approximately 6.5 x 4.2 cm at the level 3 adenopathy is also seen over an area of 5.5 x 2.8 submandibular gland and sternocleidomastoid muscle are poorly-delineated in the adenopathy. Supraclavicular lymph node is seen measuring over 24 mm. No hypermetabolic activity in the abdomen. Mild infrarenal abdominal aortic aneurysm measuring 28 mm.   • 11/29/18 - Patient underwent Infusaport placement by Ashish Mcginnis M.D.   • 11/30/18 - WBC 8.5, hemoglobin 12.7, platelet count 358,000. Discussed workup results. Patient started on Allopurinol 300 mg p.o. daily with repeat tonsillar biopsy planned for histology. SPEP with hypoalbuminemia. Hepatitis B core antibody surface antigen, hepatis C antibody all  non-reactive.   • 12/19/18 - Patient underwent ultrasound-guided needle biopsy of right neck mass by Ashish Funk M.D. with pathology revealing diffuse large B-cell lymphoma germinal center type. Flow cytometry revealed CD10 positive B-cell lymphoma. The lymphoma was CD20 and surface kappa and lambda chain positive.   • 12/27/18 - Chemotherapy orders written for R-CHOP. Rituximab 375 mg/M2 IV day 1, Cyclophosphamide 750 mg/M2 IV day 1, Doxorubicin 50 mg/M2 IV day 1, Vincristine 1.4 mg/M2 IV day 1 with a max of 2 mg dose and Prednisone 100 mg p.o. days 3-8 to cycle every 21 days for about six cycles in a curative intent. Neulasta Onpro also ordered.    • 1/3/19 - Discussed results of workup and chemotherapy.   • 1/10/19 - Cycle 1 chemotherapy given.   • 1/17/19 - WBC 2.1 with 28% neutrophils, 49% lymphocytes, 8% monocytes, hemoglobin 12.9, platelet count 234,000.   • 1/22/19 - Patient tolerating chemotherapy well. Sed rate 49 (0-30).  ESR 49 (H).   • 1/31/19 - Cycle 2 R-CHOP initiated with Neulasta support. Sed rate 81 (0-30).  ESR 81 (H). Uric acid 2.4 (L). Sodium 133 (L).   • 2/7/19 - Labs at [facility] with verbal report of WBC 1.5 and ANC 0.41. Cipro 500 mg p.o. b.i.d. x7 days as prophylaxis prescribed.   • 2/12/19 - Patient tolerating chemotherapy overall well to date with some expected taste changes and constipation. Patient advised she may use MiraLAX p.r.n. and patient advised to play with diet as well use of sour candies or mints to stimulate the taste buds. A brief period of neutropenia without febrile neutropenia noted during the last cycle with Neulasta given. Allopurinol discontinued.   • 2/21/19 - Ferritin 121 (N), iron 18 (L),  (N), iron saturation 6% (L). Prescribed Ferrous Sulfate 325 mg daily. Received cycle 3 day 1 R-CHOP. Port will not draw. Activase utilized and started on Coumadin 1 mg p.o. daily.   • 2/28/19 - Stool heme negative x3. CT neck and chest with contrast showed  previously-identified mass centered with right palatine tonsils appears to have resolved. Previously-identified right cervical lymphadenopathy has resolved. No evidence of lymphadenopathy within the chest. 4 mm left upper lobe nodule is stable from prior PET CT.  This is indeterminate. Recommend attention to follow up. Echocardiogram showed aortic valve sclerosis, EF 65-70%. No pericardial effusion.   • 3/6/19 - Discussed with the patient the option of either continuing with chemotherapy for a total of six cycles versus stopping chemotherapy and proceeding with radiation to her site of disease. Patient would like to complete planned chemotherapy course.   • 3/6/19 - PT 11.8, INR 1.0.  Patient continued on Coumadin 1 mg p.o. daily for port malfunction.  ESR 64, high.    • 3/14/19 - Cycle 4 chemotherapy given.   • 4/4/19 - Cycle 5 chemotherapy given.   • 4/16/19 - Patient complains of upper respiratory symptoms which are improving on Cipro.   • 4/25/19 - Cycle 6 chemotherapy given. Urinalysis showed trace heme.   • 5/5/19 - . Prescribed Ciprofloxacin 500 mg p.o. b.i.d. x7 days.   • 5/8/19 - Echocardiogram 60-65% ejection fraction with mild mitral and tricuspid regurgitation. CT soft tissue neck with no acute process or adenopathy seen. CT chest, abdomen and pelvis showed no acute cardiopulmonary process, no suspicious lymphadenopathy. Previously described 4 mm nodule in the left upper lobe is likely an intrafissural lymph node. No acute intraabdominal or intrapelvic process. No suspicious lymphadenopathy. Atherosclerosis with infrarenal abdominal aortic ectasia/aneurysm with mural thrombus formation. Coumadin increased to 5 mg p.o. Q.PM.  • 8/8/2019 Coumadin dose decreased to 1 mg p.o. daily for [id number] maintenance. Sed rate 61 (0-30).  ().  • 11/1/19 - CT soft tissue neck showed no evidence of recurrent or metastatic disease in the neck in this patient with a history of lymphoma. No acute  findings. CT chest abdomen and pelvis showed No convincing evidence of recurrent or metastatic disease in the chest, abdomen or pelvis. Stable 3.2 cm fusiform infrarenal abdominal aortic aneurysm with 2% luminal stenosis secondary to atherosclerotic plaquing, and irregular ulcerated plaquing. Stable 4 mm noncalcified left upper lobe pulmonary nodule since 11/20/2018. Benign etiology is favored. This may represent a noncalcified granuloma, given the extensive granulomatous changes elsewhere within the chest.  Uncomplicated cholelithiasis.  • 5/12/2020: CT soft tissue neck with contrast, CT chest with contrast: No evidence of lymphadenopathy within the neck or chest.  Stable small nodule in the right breast likely benign.  Recommend correlation with mammography.  • 8/3/2020: Bilateral 3D mammogram: BI-RADS 2 benign.  No evidence of malignancy.  Will circumscribed masses within both breasts, more numerous in the left breast, appear relatively stable from prior mammograms from 2014.   • 9/1/2020   • 11/23/2020 No convincing evidence of recurrent disease or metastatic disease in the chest, abdomen, or pelvis. A 4 mm noncalcified left upper lobe nodule is stable since the most remote available PET/CT from 11/20/2018. This confirms over 2 years of stability and benignity.  Stable 3.2 cm fusiform infrarenal abdominal aortic aneurysm with irregular and eccentric mural thrombus.  Uncomplicated cholelithiasis.   • 6/3/2021: WBC 10.02, hemoglobin 14.2, platelets 329  • 6/17/2021: CT chest abdomen pelvis with stable 4 mm noncalcified left upper lobe lung nodule unchanged from the prior examination.  Stable since PET/CT from 11/20/2018.  Confirming 2 years of stability.  No further surveillance needed.  No other sites of disease in the chest abdomen pelvis.      2.   Iron deficiency anemia diagnosed February 2018.   • 1/31/19 - Hemoglobin 11.9, MCV 87.9 and RDW 14.9.   • 2/21/19 - Ferritin 121 (N), iron 18 (L),   (N), iron saturation 6 (L). Prescribed Ferrous Sulfate 325 mg one tablet p.o. daily.   • 2/28/19 - Stool heme negative x3.   • 3/26/19 - Patient reports she is not taking ferrous sulfate daily as prescribed.  She chose to increase her intake of red meat.  Instructed to start taking ferrous sulfate 325 mg p.o. daily.  Explained rationale.   • 5/14/19 - 160 (). Iron 17 (). TIBC 283 (228-428). Iron saturation 6 (15-50).  Ferritin 160.  • 8/8/2019 hemoglobin 13.2, MCV 82. Ferritin 72 ().   • 5/21/2020: Ferritin 107.3, iron saturation 17 low, serum iron 57, TIBC 332, , creatinine 0.55, LFTs normal, WBC 8.9, hemoglobin 14.6, platelets 341, MCV 90.6  • 12/3/2020 WBC 10.1, Hgb 14.5, Plts 336    3.   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   • 5/9/19 - Prescribed Coumadin 5 mg p.o. daily. Referred to Dr. Duckworth for evaluation of infrarenal aortic aneurysm and mural thrombus seen on CT abdomen and pelvis.   • 5/14/19 - INR 1.4. Increased Coumadin to 6 mg p.o. daily. Follow INR protocol. INR’s to be done at [facility]. Prescription given.  • 8/8/2019 patient claims to have been seen by Dr. Duckworth and advised to stop warfarin.    Past Medical History:   Diagnosis Date   • Hypertension 1998     No past surgical history on file.      Current Outpatient Medications:   •  amLODIPine (NORVASC) 2.5 MG tablet, , Disp: , Rfl:   •  B Complex Vitamins (VITAMIN B COMPLEX 100 IJ), Inject  as directed., Disp: , Rfl:   •  Calcium-Magnesium-Vitamin D (CALCIUM 500 PO), Take  by mouth Daily., Disp: , Rfl:   •  Cholecalciferol (VITAMIN D) 2000 units capsule, Take  by mouth Daily., Disp: , Rfl:   •  ferrous gluconate (FERGON) 324 MG tablet, TAKE 1 TABLET BY MOUTH EVERY DAY WITH BREAKFAST, Disp: 30 tablet, Rfl: 1  •  Loratadine (CLARITIN) 10 MG capsule, Take  by mouth Daily., Disp: , Rfl:   •  Potassium 99 MG tablet, Take  by mouth Daily., Disp: , Rfl:   •  warfarin (COUMADIN) 1  "MG tablet, Take 1 tablet by mouth Daily., Disp: 30 tablet, Rfl: 3    No Known Allergies    Family History   Problem Relation Age of Onset   • Kidney cancer Father 66   • Leukemia Brother 48   • Multiple myeloma Brother 47     Cancer-related family history includes Kidney cancer (age of onset: 66) in her father.    Social History     Tobacco Use   • Smoking status: Current Every Day Smoker     Packs/day: 0.50     Years: 10.00     Pack years: 5.00     Types: Cigarettes   • Smokeless tobacco: Never Used   • Tobacco comment: started smoking when she was in her 20’s   Substance Use Topics   • Alcohol use: No   • Drug use: No     I have reviewed the history of present illness, past medical history, family history, social history, lab results, all notes and other records since the patient was last seen on 8/8/19.     SUBJECTIVE:   Patient seen for follow-up.  No new symptoms of weight loss fatigue night sweats.  No new lumps or bumps felt.      REVIEW OF SYSTEMS:  12 point system negative except above.    OBJECTIVE:    Vitals:    12/16/21 1410   BP: 152/88   Pulse: 97   Resp: 18   Temp: 97.2 °F (36.2 °C)   SpO2: 97%   Weight: 75.4 kg (166 lb 3.2 oz)   Height: 165.1 cm (65\")   PainSc: 0-No pain     ECOG  (0) Fully active, able to carry on all predisease performance without restriction    Physical Exam   Constitutional: She is oriented to person, place, and time. She appears well-developed. No distress.   HENT:   Head: Normocephalic and atraumatic.   Eyes: Conjunctivae are normal. Right eye exhibits no discharge. Left eye exhibits no discharge. No scleral icterus.   Neck: No thyromegaly present.   Cardiovascular: Normal rate, regular rhythm and normal heart sounds. Exam reveals no gallop and no friction rub.   Pulmonary/Chest: Effort normal. No stridor. No respiratory distress. She has no wheezes.   Left chest wall port   Abdominal: Soft. Bowel sounds are normal. She exhibits no mass. There is no abdominal tenderness. There " is no rebound and no guarding.   Musculoskeletal: Normal range of motion. No tenderness.   Lymphadenopathy:     She has no cervical adenopathy.   Neurological: She is alert and oriented to person, place, and time. She exhibits normal muscle tone.   Skin: Skin is warm. No rash noted. She is not diaphoretic. No erythema.   Psychiatric: Her behavior is normal.   Vitals reviewed.     I have reexamined the patient and the results are consistent with the previously documented exam. Taylor Hernandez MD       RECENT LABS  Lab Results - Last 18 Months   Lab Units 12/09/21  1400 06/03/21  1318 12/03/20  1407   WBC 10*3/mm3 9.79 10.02 10.16   HEMOGLOBIN g/dL 14.5 14.2 14.5   HEMATOCRIT % 43.9 43.3 44.3   PLATELETS 10*3/mm3 327 329 336   MCV fL 90.9 92.1 91.3     Lab Results - Last 18 Months   Lab Units 12/09/21  1400 06/03/21  1318 11/23/20  1052 09/01/20  1446   SODIUM mmol/L 137 139  --  139   POTASSIUM mmol/L 3.6 3.8  --  3.5   CHLORIDE mmol/L 102 102  --  101   CO2 mmol/L 27.0 25.0  --  29.0   BUN mg/dL 9 7*  --  8   CREATININE mg/dL 0.71 0.60 0.70 0.64   CALCIUM mg/dL 9.0 9.4  --  9.0   BILIRUBIN mg/dL 0.2 0.3  --  0.2   ALK PHOS U/L 110 103  --  103   ALT (SGPT) U/L 16 12  --  16   AST (SGOT) U/L 18 16  --  18   GLUCOSE mg/dL 100* 100*  --  103*     Lab Results   Component Value Date    GLUCOSE 100 (H) 12/09/2021    BUN 9 12/09/2021    CREATININE 0.71 12/09/2021    EGFRIFNONA 83 12/09/2021    BCR 12.7 12/09/2021    K 3.6 12/09/2021    CO2 27.0 12/09/2021    CALCIUM 9.0 12/09/2021    ALBUMIN 3.60 12/09/2021    LABIL2 0.8 (L) 04/25/2019    AST 18 12/09/2021    ALT 16 12/09/2021     Lab Results   Component Value Date    IRON 57 06/03/2021    TIBC 352 06/03/2021    FERRITIN 128.70 06/03/2021     No results found for: FOLATE  No results found for: OCCULTBLD  No results found for: RETICCTPCT  No results found for: [id number], TSH  No results found for: SPEP, UPEP  LDH   Date Value Ref Range Status   12/09/2021 133 (L) 135 - 214  U/L Final     Uric Acid   Date Value Ref Range Status   01/31/2019 2.4 (L) 2.6 - 8.0 mg/dL Final     Lab Results   Component Value Date    SEDRATE 61 (H) 08/08/2019     No results found for: FIBRINOGEN, HAPTOGLOBIN, DDIMER  No results found for: DDIMER  Lab Results   Component Value Date    INR 2.30 08/06/2019     No results found for:   No results found for: CEA  No results found for:   No results found for: PSA  No results found for: [id number]    Assessment/Plan     There are no diagnoses linked to this encounter.    ASSESSMENT:    1. Diffuse large B-cell lymphoma of lymph nodes of neck (CMS/HCC): Status post 6 cycles of R-CHOP  finished 4/25/2019: Restaging CT scan 5/12/2020 does not show any evidence of lymphadenopathy in the neck chest.  Restaging CT scan on 11/23/2020 negative for recurrence.  Now another CT done in June 2021 with no evidence of disease recurrence.  There is a benign nodule in the lungs which has been stable for 2 years.  No further imaging unless symptoms or signs are labs dictate  2. Encounter for care related to vascular access port.  Continue port flush.  I discussed with her about removing the port.  Patient has some apprehensions about this.  She will think about it and let us know.  If would like to get her port removed we would refer her back to Dr. Mcginnis for the same.  3. Iron deficiency anemia due to chronic blood loss:  taking oral iron.  Hemoglobin normal.  4. Tobacco use  5. Aortic mural thrombus   6. Right breast nodule seen on previous CT scan.  No palpable abnormality.  Mammogram normal on 8/3/2020.  Normal mention of the recent CT.  Continue screening mammogram.      PLAN:    · CBC BMP LDH in 6 months in follow-up.  · Possible Port-A-Cath removal if patient chooses to.  · Continue screening mammogram with primary care physician  · Follow-up in 6 months         I have reviewed and confirmed the accuracy of the patient's history: Chief complaint, HPI, ROS and  Subjective as entered by the MA/LPN/RN. Taylor Hernandez MD 12/16/21

## 2022-01-12 ENCOUNTER — HOSPITAL ENCOUNTER (OUTPATIENT)
Dept: ONCOLOGY | Facility: HOSPITAL | Age: 67
Setting detail: INFUSION SERIES
Discharge: HOME | End: 2022-01-12

## 2022-01-12 VITALS — HEIGHT: 65 IN | BODY MASS INDEX: 27.89 KG/M2 | WEIGHT: 167.4 LBS

## 2022-01-12 DIAGNOSIS — Z45.2 ENCOUNTER FOR CARE RELATED TO VASCULAR ACCESS PORT: Primary | ICD-10-CM

## 2022-01-12 DIAGNOSIS — C83.31 DIFFUSE LARGE B-CELL LYMPHOMA OF LYMPH NODES OF NECK: ICD-10-CM

## 2022-01-12 PROCEDURE — 25010000002 HEPARIN LOCK FLUSH PER 10 UNITS: Performed by: INTERNAL MEDICINE

## 2022-01-12 PROCEDURE — 96523 IRRIG DRUG DELIVERY DEVICE: CPT

## 2022-01-12 RX ORDER — SODIUM CHLORIDE 0.9 % (FLUSH) 0.9 %
20 SYRINGE (ML) INJECTION AS NEEDED
Status: CANCELLED | OUTPATIENT
Start: 2022-01-12

## 2022-01-12 RX ORDER — SODIUM CHLORIDE 0.9 % (FLUSH) 0.9 %
20 SYRINGE (ML) INJECTION AS NEEDED
Status: DISCONTINUED | OUTPATIENT
Start: 2022-01-12 | End: 2022-01-13 | Stop reason: HOSPADM

## 2022-01-12 RX ORDER — HEPARIN SODIUM (PORCINE) LOCK FLUSH IV SOLN 100 UNIT/ML 100 UNIT/ML
500 SOLUTION INTRAVENOUS AS NEEDED
Status: CANCELLED | OUTPATIENT
Start: 2022-01-12

## 2022-01-12 RX ORDER — HEPARIN SODIUM (PORCINE) LOCK FLUSH IV SOLN 100 UNIT/ML 100 UNIT/ML
500 SOLUTION INTRAVENOUS AS NEEDED
Status: DISCONTINUED | OUTPATIENT
Start: 2022-01-12 | End: 2022-01-13 | Stop reason: HOSPADM

## 2022-01-12 RX ADMIN — Medication 30 ML: at 14:00

## 2022-01-12 RX ADMIN — HEPARIN 500 UNITS: 100 SYRINGE at 14:02

## 2022-02-16 ENCOUNTER — HOSPITAL ENCOUNTER (OUTPATIENT)
Dept: ONCOLOGY | Facility: HOSPITAL | Age: 67
Setting detail: INFUSION SERIES
Discharge: HOME | End: 2022-02-16

## 2022-02-16 VITALS — BODY MASS INDEX: 27.92 KG/M2 | HEIGHT: 65 IN | WEIGHT: 167.6 LBS

## 2022-02-16 DIAGNOSIS — C83.31 DIFFUSE LARGE B-CELL LYMPHOMA OF LYMPH NODES OF NECK: ICD-10-CM

## 2022-02-16 DIAGNOSIS — Z45.2 ENCOUNTER FOR CARE RELATED TO VASCULAR ACCESS PORT: Primary | ICD-10-CM

## 2022-02-16 PROCEDURE — 96523 IRRIG DRUG DELIVERY DEVICE: CPT

## 2022-02-16 PROCEDURE — 25010000002 HEPARIN LOCK FLUSH PER 10 UNITS: Performed by: INTERNAL MEDICINE

## 2022-02-16 RX ORDER — HEPARIN SODIUM (PORCINE) LOCK FLUSH IV SOLN 100 UNIT/ML 100 UNIT/ML
500 SOLUTION INTRAVENOUS AS NEEDED
Status: DISCONTINUED | OUTPATIENT
Start: 2022-02-16 | End: 2022-02-17 | Stop reason: HOSPADM

## 2022-02-16 RX ORDER — SODIUM CHLORIDE 0.9 % (FLUSH) 0.9 %
20 SYRINGE (ML) INJECTION AS NEEDED
Status: CANCELLED | OUTPATIENT
Start: 2022-02-16

## 2022-02-16 RX ORDER — SODIUM CHLORIDE 0.9 % (FLUSH) 0.9 %
20 SYRINGE (ML) INJECTION AS NEEDED
Status: DISCONTINUED | OUTPATIENT
Start: 2022-02-16 | End: 2022-02-17 | Stop reason: HOSPADM

## 2022-02-16 RX ORDER — HEPARIN SODIUM (PORCINE) LOCK FLUSH IV SOLN 100 UNIT/ML 100 UNIT/ML
500 SOLUTION INTRAVENOUS AS NEEDED
Status: CANCELLED | OUTPATIENT
Start: 2022-02-16

## 2022-02-16 RX ADMIN — HEPARIN 500 UNITS: 100 SYRINGE at 14:27

## 2022-02-16 RX ADMIN — Medication 30 ML: at 14:25

## 2022-03-23 ENCOUNTER — HOSPITAL ENCOUNTER (OUTPATIENT)
Dept: ONCOLOGY | Facility: HOSPITAL | Age: 67
Setting detail: INFUSION SERIES
Discharge: HOME | End: 2022-03-23

## 2022-03-23 VITALS — WEIGHT: 169.6 LBS | BODY MASS INDEX: 28.26 KG/M2 | HEIGHT: 65 IN

## 2022-03-23 DIAGNOSIS — C83.31 DIFFUSE LARGE B-CELL LYMPHOMA OF LYMPH NODES OF NECK: ICD-10-CM

## 2022-03-23 DIAGNOSIS — Z45.2 ENCOUNTER FOR CARE RELATED TO VASCULAR ACCESS PORT: Primary | ICD-10-CM

## 2022-03-23 PROCEDURE — 96523 IRRIG DRUG DELIVERY DEVICE: CPT

## 2022-03-23 PROCEDURE — 25010000002 HEPARIN LOCK FLUSH PER 10 UNITS: Performed by: INTERNAL MEDICINE

## 2022-03-23 RX ORDER — SODIUM CHLORIDE 0.9 % (FLUSH) 0.9 %
20 SYRINGE (ML) INJECTION AS NEEDED
Status: DISCONTINUED | OUTPATIENT
Start: 2022-03-23 | End: 2022-03-24 | Stop reason: HOSPADM

## 2022-03-23 RX ORDER — SODIUM CHLORIDE 0.9 % (FLUSH) 0.9 %
20 SYRINGE (ML) INJECTION AS NEEDED
Status: CANCELLED | OUTPATIENT
Start: 2022-03-23

## 2022-03-23 RX ORDER — HEPARIN SODIUM (PORCINE) LOCK FLUSH IV SOLN 100 UNIT/ML 100 UNIT/ML
500 SOLUTION INTRAVENOUS AS NEEDED
Status: DISCONTINUED | OUTPATIENT
Start: 2022-03-23 | End: 2022-03-24 | Stop reason: HOSPADM

## 2022-03-23 RX ORDER — HEPARIN SODIUM (PORCINE) LOCK FLUSH IV SOLN 100 UNIT/ML 100 UNIT/ML
500 SOLUTION INTRAVENOUS AS NEEDED
Status: CANCELLED | OUTPATIENT
Start: 2022-03-23

## 2022-03-23 RX ADMIN — HEPARIN 500 UNITS: 100 SYRINGE at 14:35

## 2022-03-23 RX ADMIN — Medication 30 ML: at 14:33

## 2022-04-20 ENCOUNTER — HOSPITAL ENCOUNTER (OUTPATIENT)
Dept: ONCOLOGY | Facility: HOSPITAL | Age: 67
Setting detail: INFUSION SERIES
Discharge: HOME | End: 2022-04-20

## 2022-04-20 VITALS — HEIGHT: 65 IN | WEIGHT: 170.4 LBS | BODY MASS INDEX: 28.39 KG/M2

## 2022-04-20 DIAGNOSIS — C83.31 DIFFUSE LARGE B-CELL LYMPHOMA OF LYMPH NODES OF NECK: ICD-10-CM

## 2022-04-20 DIAGNOSIS — Z45.2 ENCOUNTER FOR CARE RELATED TO VASCULAR ACCESS PORT: Primary | ICD-10-CM

## 2022-04-20 PROCEDURE — 96523 IRRIG DRUG DELIVERY DEVICE: CPT

## 2022-04-20 PROCEDURE — 25010000002 HEPARIN LOCK FLUSH PER 10 UNITS: Performed by: INTERNAL MEDICINE

## 2022-04-20 RX ORDER — SODIUM CHLORIDE 0.9 % (FLUSH) 0.9 %
20 SYRINGE (ML) INJECTION AS NEEDED
Status: DISCONTINUED | OUTPATIENT
Start: 2022-04-20 | End: 2022-04-21 | Stop reason: HOSPADM

## 2022-04-20 RX ORDER — HEPARIN SODIUM (PORCINE) LOCK FLUSH IV SOLN 100 UNIT/ML 100 UNIT/ML
500 SOLUTION INTRAVENOUS AS NEEDED
Status: CANCELLED | OUTPATIENT
Start: 2022-04-20

## 2022-04-20 RX ORDER — HEPARIN SODIUM (PORCINE) LOCK FLUSH IV SOLN 100 UNIT/ML 100 UNIT/ML
500 SOLUTION INTRAVENOUS AS NEEDED
Status: DISCONTINUED | OUTPATIENT
Start: 2022-04-20 | End: 2022-04-21 | Stop reason: HOSPADM

## 2022-04-20 RX ORDER — SODIUM CHLORIDE 0.9 % (FLUSH) 0.9 %
20 SYRINGE (ML) INJECTION AS NEEDED
Status: CANCELLED | OUTPATIENT
Start: 2022-04-20

## 2022-04-20 RX ADMIN — Medication 20 ML: at 14:04

## 2022-04-20 RX ADMIN — HEPARIN 500 UNITS: 100 SYRINGE at 14:06

## 2022-05-25 ENCOUNTER — HOSPITAL ENCOUNTER (OUTPATIENT)
Dept: ONCOLOGY | Facility: HOSPITAL | Age: 67
Setting detail: INFUSION SERIES
Discharge: HOME | End: 2022-05-25

## 2022-05-25 VITALS — HEIGHT: 65 IN | BODY MASS INDEX: 27.92 KG/M2 | WEIGHT: 167.6 LBS

## 2022-05-25 DIAGNOSIS — Z45.2 ENCOUNTER FOR CARE RELATED TO VASCULAR ACCESS PORT: Primary | ICD-10-CM

## 2022-05-25 DIAGNOSIS — C83.31 DIFFUSE LARGE B-CELL LYMPHOMA OF LYMPH NODES OF NECK: ICD-10-CM

## 2022-05-25 PROCEDURE — 25010000002 HEPARIN LOCK FLUSH PER 10 UNITS: Performed by: INTERNAL MEDICINE

## 2022-05-25 PROCEDURE — 96523 IRRIG DRUG DELIVERY DEVICE: CPT

## 2022-05-25 RX ORDER — HEPARIN SODIUM (PORCINE) LOCK FLUSH IV SOLN 100 UNIT/ML 100 UNIT/ML
500 SOLUTION INTRAVENOUS AS NEEDED
Status: CANCELLED | OUTPATIENT
Start: 2022-05-25

## 2022-05-25 RX ORDER — SODIUM CHLORIDE 0.9 % (FLUSH) 0.9 %
20 SYRINGE (ML) INJECTION AS NEEDED
Status: CANCELLED | OUTPATIENT
Start: 2022-05-25

## 2022-05-25 RX ORDER — SODIUM CHLORIDE 0.9 % (FLUSH) 0.9 %
20 SYRINGE (ML) INJECTION AS NEEDED
Status: DISCONTINUED | OUTPATIENT
Start: 2022-05-25 | End: 2022-05-26 | Stop reason: HOSPADM

## 2022-05-25 RX ORDER — HEPARIN SODIUM (PORCINE) LOCK FLUSH IV SOLN 100 UNIT/ML 100 UNIT/ML
500 SOLUTION INTRAVENOUS AS NEEDED
Status: DISCONTINUED | OUTPATIENT
Start: 2022-05-25 | End: 2022-05-26 | Stop reason: HOSPADM

## 2022-05-25 RX ADMIN — Medication 20 ML: at 14:01

## 2022-05-25 RX ADMIN — HEPARIN 500 UNITS: 100 SYRINGE at 14:03

## 2022-06-29 ENCOUNTER — HOSPITAL ENCOUNTER (OUTPATIENT)
Dept: ONCOLOGY | Facility: HOSPITAL | Age: 67
Setting detail: INFUSION SERIES
Discharge: HOME | End: 2022-06-29

## 2022-06-29 VITALS — BODY MASS INDEX: 27.39 KG/M2 | WEIGHT: 164.4 LBS | HEIGHT: 65 IN

## 2022-06-29 DIAGNOSIS — C83.31 DIFFUSE LARGE B-CELL LYMPHOMA OF LYMPH NODES OF NECK: ICD-10-CM

## 2022-06-29 DIAGNOSIS — Z45.2 ENCOUNTER FOR CARE RELATED TO VASCULAR ACCESS PORT: Primary | ICD-10-CM

## 2022-06-29 LAB
ALBUMIN SERPL-MCNC: 3.9 G/DL (ref 3.5–5.2)
ALBUMIN/GLOB SERPL: 1.3 G/DL
ALP SERPL-CCNC: 112 U/L (ref 39–117)
ALT SERPL W P-5'-P-CCNC: 11 U/L (ref 1–33)
ANION GAP SERPL CALCULATED.3IONS-SCNC: 12 MMOL/L (ref 5–15)
AST SERPL-CCNC: 17 U/L (ref 1–32)
BASOPHILS # BLD AUTO: 0.03 10*3/MM3 (ref 0–0.2)
BASOPHILS NFR BLD AUTO: 0.3 % (ref 0–1.5)
BILIRUB SERPL-MCNC: 0.3 MG/DL (ref 0–1.2)
BUN SERPL-MCNC: 7 MG/DL (ref 8–23)
BUN/CREAT SERPL: 12.3 (ref 7–25)
CALCIUM SPEC-SCNC: 8.7 MG/DL (ref 8.6–10.5)
CHLORIDE SERPL-SCNC: 103 MMOL/L (ref 98–107)
CO2 SERPL-SCNC: 25 MMOL/L (ref 22–29)
CREAT SERPL-MCNC: 0.57 MG/DL (ref 0.57–1)
DEPRECATED RDW RBC AUTO: 44.5 FL (ref 37–54)
EGFRCR SERPLBLD CKD-EPI 2021: 101 ML/MIN/1.73
EOSINOPHIL # BLD AUTO: 0.23 10*3/MM3 (ref 0–0.4)
EOSINOPHIL NFR BLD AUTO: 2.1 % (ref 0.3–6.2)
ERYTHROCYTE [DISTWIDTH] IN BLOOD BY AUTOMATED COUNT: 13.8 % (ref 12.3–15.4)
GLOBULIN UR ELPH-MCNC: 3.1 GM/DL
GLUCOSE SERPL-MCNC: 92 MG/DL (ref 65–99)
HCT VFR BLD AUTO: 44.2 % (ref 34–46.6)
HGB BLD-MCNC: 14.4 G/DL (ref 12–15.9)
LDH SERPL-CCNC: 157 U/L (ref 135–214)
LYMPHOCYTES # BLD AUTO: 2.12 10*3/MM3 (ref 0.7–3.1)
LYMPHOCYTES NFR BLD AUTO: 19.3 % (ref 19.6–45.3)
MCH RBC QN AUTO: 29.5 PG (ref 26.6–33)
MCHC RBC AUTO-ENTMCNC: 32.6 G/DL (ref 31.5–35.7)
MCV RBC AUTO: 90.6 FL (ref 79–97)
MONOCYTES # BLD AUTO: 0.75 10*3/MM3 (ref 0.1–0.9)
MONOCYTES NFR BLD AUTO: 6.8 % (ref 5–12)
NEUTROPHILS NFR BLD AUTO: 7.85 10*3/MM3 (ref 1.7–7)
NEUTROPHILS NFR BLD AUTO: 71.5 % (ref 42.7–76)
PLATELET # BLD AUTO: 345 10*3/MM3 (ref 140–450)
PMV BLD AUTO: 9.9 FL (ref 6–12)
POTASSIUM SERPL-SCNC: 3.8 MMOL/L (ref 3.5–5.2)
PROT SERPL-MCNC: 7 G/DL (ref 6–8.5)
RBC # BLD AUTO: 4.88 10*6/MM3 (ref 3.77–5.28)
SODIUM SERPL-SCNC: 140 MMOL/L (ref 136–145)
WBC NRBC COR # BLD: 10.98 10*3/MM3 (ref 3.4–10.8)

## 2022-06-29 PROCEDURE — 96372 THER/PROPH/DIAG INJ SC/IM: CPT

## 2022-06-29 PROCEDURE — 85025 COMPLETE CBC W/AUTO DIFF WBC: CPT | Performed by: INTERNAL MEDICINE

## 2022-06-29 PROCEDURE — 25010000002 HEPARIN LOCK FLUSH PER 10 UNITS: Performed by: INTERNAL MEDICINE

## 2022-06-29 PROCEDURE — 36591 DRAW BLOOD OFF VENOUS DEVICE: CPT

## 2022-06-29 PROCEDURE — 80053 COMPREHEN METABOLIC PANEL: CPT | Performed by: INTERNAL MEDICINE

## 2022-06-29 PROCEDURE — 83615 LACTATE (LD) (LDH) ENZYME: CPT | Performed by: INTERNAL MEDICINE

## 2022-06-29 RX ORDER — HEPARIN SODIUM (PORCINE) LOCK FLUSH IV SOLN 100 UNIT/ML 100 UNIT/ML
500 SOLUTION INTRAVENOUS AS NEEDED
Status: CANCELLED | OUTPATIENT
Start: 2022-06-29

## 2022-06-29 RX ORDER — SODIUM CHLORIDE 0.9 % (FLUSH) 0.9 %
20 SYRINGE (ML) INJECTION AS NEEDED
Status: CANCELLED | OUTPATIENT
Start: 2022-06-29

## 2022-06-29 RX ORDER — SODIUM CHLORIDE 0.9 % (FLUSH) 0.9 %
20 SYRINGE (ML) INJECTION AS NEEDED
Status: DISCONTINUED | OUTPATIENT
Start: 2022-06-29 | End: 2022-06-30 | Stop reason: HOSPADM

## 2022-06-29 RX ORDER — HEPARIN SODIUM (PORCINE) LOCK FLUSH IV SOLN 100 UNIT/ML 100 UNIT/ML
500 SOLUTION INTRAVENOUS AS NEEDED
Status: DISCONTINUED | OUTPATIENT
Start: 2022-06-29 | End: 2022-06-30 | Stop reason: HOSPADM

## 2022-06-29 RX ADMIN — HEPARIN 500 UNITS: 100 SYRINGE at 13:58

## 2022-06-29 RX ADMIN — Medication 20 ML: at 13:56

## 2022-07-01 NOTE — PROGRESS NOTES
Hematology/Oncology Outpatient Follow Up    PATIENT NAME:Sarah Borja  :1955  MRN: [id number]  PRIMARY CARE PHYSICIAN: Miguel Lezama MD  REFERRING PHYSICIAN: Miguel Lezama, *    Chief Complaint   Patient presents with   • Follow-up     Diffuse large B-cell lymphoma of lymph nodes of neck        HISTORY OF PRESENT ILLNESS:   1. Right tonsillar diffuse large B-cell lymphoma diagnosed 2018.  • This  female who claims to have developed a sore throat in 2018. She saw her primary care provider, Tran Vaz N.P. and was prescribed antibiotic. She did have palpable lymph nodes and a CT scan of the soft tissue neck was performed on 10/11/18 revealing asymmetric enlargement of the right palatine tonsil. There was right jugular digastric and posterior triangle adenopathy. The largest lymph node within the posterior triangle measured approximately 3.6 x 3.2 x 2.0 cm. She was referred to Sly Rosario M.D. and was seen by him in initial consultation on 18 where laryngoscopy revealed right tonsillar mass. FNA was performed on in on 10/31/18 with specimen sent for pathology, but routine staining was not performed. Flow cytometry revealed a CD10 positive monoclonal B-cell population which by light scatter analysis fell into both the small cell and large cell regions, though predominantly into the large cell region. FISH analysis for BCL2, BCL6 and MYC were negative. Though Dr. Rosario had requested routine staining of the specimen, this was not performed and Pathology had wanted a more fresh specimen, prior to which she was referred to me for consultation. The patient today is denying any weight loss. She does complain of low-grade fevers controlled with Aspirin and claims to have had night sweats on and off for 10 years.   • 18 - [facility] cell non-Hodgkin's lymphoma of the right tonsil on referral by  Sly Rosario M.D. WBC 10.9 with 69% neutrophils, 23% lymphocytes, 6% monocytes, hemoglobin 13.8, platelet count 408,000.  ().  Globulin 4.1 (2.5-3.8), uric acid 5.0 (2.6-8.0), ESR 61 (0-30).      • 11/16/18 - Bone marrow aspiration and biopsy with normocellular marrow with maturing trilineage hematopoiesis. Immunohistochemistry had no increase in CD34 positive blasts or CD20 positive B-lymphocytes. Flow cytometry had no evidence of an abnormal cell type. Cytogenetics revealed 48 XX normal female karyotype. Echocardiogram with mild mitral and mild tricuspid regurgitation, moderate concentric left ventricular hypertrophy and LV systolic function normal with EF about 65-70%.   • 11/17/18 - Echocardiogram with mild mitral and tricuspid regurgitation, moderate concentric left ventricular hypertrophy, LV systolic function normal with EF about 65-70%.   • 11/19/18 - Chest x-ray with clear lungs.   • 11/20/18 - PET scan with extensive right neck lymphadenopathy beginning at the right tonsillar pillar and continuing to the supraclavicular region with SUV between 26 and 16 with extremely hypermetabolic lymph nodes, changes of cholelithiasis and old healed granulomatous disease. Mass of the nodes at L2 are approximately 6.5 x 4.2 cm at the level 3 adenopathy is also seen over an area of 5.5 x 2.8 submandibular gland and sternocleidomastoid muscle are poorly-delineated in the adenopathy. Supraclavicular lymph node is seen measuring over 24 mm. No hypermetabolic activity in the abdomen. Mild infrarenal abdominal aortic aneurysm measuring 28 mm.   • 11/29/18 - Patient underwent Infusaport placement by Ashish Mcginnis M.D.   • 11/30/18 - WBC 8.5, hemoglobin 12.7, platelet count 358,000. Discussed workup results. Patient started on Allopurinol 300 mg p.o. daily with repeat tonsillar biopsy planned for histology. SPEP with hypoalbuminemia. Hepatitis B core antibody surface antigen, hepatis C antibody all  non-reactive.   • 12/19/18 - Patient underwent ultrasound-guided needle biopsy of right neck mass by Ashish Funk M.D. with pathology revealing diffuse large B-cell lymphoma germinal center type. Flow cytometry revealed CD10 positive B-cell lymphoma. The lymphoma was CD20 and surface kappa and lambda chain positive.   • 12/27/18 - Chemotherapy orders written for R-CHOP. Rituximab 375 mg/M2 IV day 1, Cyclophosphamide 750 mg/M2 IV day 1, Doxorubicin 50 mg/M2 IV day 1, Vincristine 1.4 mg/M2 IV day 1 with a max of 2 mg dose and Prednisone 100 mg p.o. days 3-8 to cycle every 21 days for about six cycles in a curative intent. Neulasta Onpro also ordered.    • 1/3/19 - Discussed results of workup and chemotherapy.   • 1/10/19 - Cycle 1 chemotherapy given.   • 1/17/19 - WBC 2.1 with 28% neutrophils, 49% lymphocytes, 8% monocytes, hemoglobin 12.9, platelet count 234,000.   • 1/22/19 - Patient tolerating chemotherapy well. Sed rate 49 (0-30).  ESR 49 (H).   • 1/31/19 - Cycle 2 R-CHOP initiated with Neulasta support. Sed rate 81 (0-30).  ESR 81 (H). Uric acid 2.4 (L). Sodium 133 (L).   • 2/7/19 - Labs at [facility] with verbal report of WBC 1.5 and ANC 0.41. Cipro 500 mg p.o. b.i.d. x7 days as prophylaxis prescribed.   • 2/12/19 - Patient tolerating chemotherapy overall well to date with some expected taste changes and constipation. Patient advised she may use MiraLAX p.r.n. and patient advised to play with diet as well use of sour candies or mints to stimulate the taste buds. A brief period of neutropenia without febrile neutropenia noted during the last cycle with Neulasta given. Allopurinol discontinued.   • 2/21/19 - Ferritin 121 (N), iron 18 (L),  (N), iron saturation 6% (L). Prescribed Ferrous Sulfate 325 mg daily. Received cycle 3 day 1 R-CHOP. Port will not draw. Activase utilized and started on Coumadin 1 mg p.o. daily.   • 2/28/19 - Stool heme negative x3. CT neck and chest with contrast showed  previously-identified mass centered with right palatine tonsils appears to have resolved. Previously-identified right cervical lymphadenopathy has resolved. No evidence of lymphadenopathy within the chest. 4 mm left upper lobe nodule is stable from prior PET CT.  This is indeterminate. Recommend attention to follow up. Echocardiogram showed aortic valve sclerosis, EF 65-70%. No pericardial effusion.   • 3/6/19 - Discussed with the patient the option of either continuing with chemotherapy for a total of six cycles versus stopping chemotherapy and proceeding with radiation to her site of disease. Patient would like to complete planned chemotherapy course.   • 3/6/19 - PT 11.8, INR 1.0.  Patient continued on Coumadin 1 mg p.o. daily for port malfunction.  ESR 64, high.    • 3/14/19 - Cycle 4 chemotherapy given.   • 4/4/19 - Cycle 5 chemotherapy given.   • 4/16/19 - Patient complains of upper respiratory symptoms which are improving on Cipro.   • 4/25/19 - Cycle 6 chemotherapy given. Urinalysis showed trace heme.   • 5/5/19 - . Prescribed Ciprofloxacin 500 mg p.o. b.i.d. x7 days.   • 5/8/19 - Echocardiogram 60-65% ejection fraction with mild mitral and tricuspid regurgitation. CT soft tissue neck with no acute process or adenopathy seen. CT chest, abdomen and pelvis showed no acute cardiopulmonary process, no suspicious lymphadenopathy. Previously described 4 mm nodule in the left upper lobe is likely an intrafissural lymph node. No acute intraabdominal or intrapelvic process. No suspicious lymphadenopathy. Atherosclerosis with infrarenal abdominal aortic ectasia/aneurysm with mural thrombus formation. Coumadin increased to 5 mg p.o. Q.PM.  • 8/8/2019 Coumadin dose decreased to 1 mg p.o. daily for Bmbbxc-t-Gvrm maintenance. Sed rate 61 (0-30).  ().  • 11/1/19 - CT soft tissue neck showed no evidence of recurrent or metastatic disease in the neck in this patient with a history of lymphoma. No acute  findings. CT chest abdomen and pelvis showed No convincing evidence of recurrent or metastatic disease in the chest, abdomen or pelvis. Stable 3.2 cm fusiform infrarenal abdominal aortic aneurysm with 2% luminal stenosis secondary to atherosclerotic plaquing, and irregular ulcerated plaquing. Stable 4 mm noncalcified left upper lobe pulmonary nodule since 11/20/2018. Benign etiology is favored. This may represent a noncalcified granuloma, given the extensive granulomatous changes elsewhere within the chest.  Uncomplicated cholelithiasis.  • 5/12/2020: CT soft tissue neck with contrast, CT chest with contrast: No evidence of lymphadenopathy within the neck or chest.  Stable small nodule in the right breast likely benign.  Recommend correlation with mammography.  • 8/3/2020: Bilateral 3D mammogram: BI-RADS 2 benign.  No evidence of malignancy.  Will circumscribed masses within both breasts, more numerous in the left breast, appear relatively stable from prior mammograms from 2014.   • 9/1/2020   • 11/23/2020 No convincing evidence of recurrent disease or metastatic disease in the chest, abdomen, or pelvis. A 4 mm noncalcified left upper lobe nodule is stable since the most remote available PET/CT from 11/20/2018. This confirms over 2 years of stability and benignity.  Stable 3.2 cm fusiform infrarenal abdominal aortic aneurysm with irregular and eccentric mural thrombus.  Uncomplicated cholelithiasis.   • 6/3/2021: WBC 10.02, hemoglobin 14.2, platelets 329  • 6/17/2021: CT chest abdomen pelvis with stable 4 mm noncalcified left upper lobe lung nodule unchanged from the prior examination.  Stable since PET/CT from 11/20/2018.  Confirming 2 years of stability.  No further surveillance needed.  No other sites of disease in the chest abdomen pelvis.  • 7/2022 - CBC CMP LDH unremarkable.      2.   Iron deficiency anemia diagnosed February 2018.   • 1/31/19 - Hemoglobin 11.9, MCV 87.9 and RDW 14.9.   • 2/21/19 -  Ferritin 121 (N), iron 18 (L),  (N), iron saturation 6 (L). Prescribed Ferrous Sulfate 325 mg one tablet p.o. daily.   • 2/28/19 - Stool heme negative x3.   • 3/26/19 - Patient reports she is not taking ferrous sulfate daily as prescribed.  She chose to increase her intake of red meat.  Instructed to start taking ferrous sulfate 325 mg p.o. daily.  Explained rationale.   • 5/14/19 - 160 (). Iron 17 (). TIBC 283 (228-428). Iron saturation 6 (15-50).  Ferritin 160.  • 8/8/2019 hemoglobin 13.2, MCV 82. Ferritin 72 ().   • 5/21/2020: Ferritin 107.3, iron saturation 17 low, serum iron 57, TIBC 332, , creatinine 0.55, LFTs normal, WBC 8.9, hemoglobin 14.6, platelets 341, MCV 90.6  • 12/3/2020 WBC 10.1, Hgb 14.5, Plts 336    3.   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   • 5/9/19 - Prescribed Coumadin 5 mg p.o. daily. Referred to Dr. Duckworth for evaluation of infrarenal aortic aneurysm and mural thrombus seen on CT abdomen and pelvis.   • 5/14/19 - INR 1.4. Increased Coumadin to 6 mg p.o. daily. Follow INR protocol. INR’s to be done at [facility]. Prescription given.  • 8/8/2019 patient claims to have been seen by Dr. Duckworth and advised to stop warfarin.    Past Medical History:   Diagnosis Date   • Hypertension 1998     No past surgical history on file.      Current Outpatient Medications:   •  amLODIPine (NORVASC) 2.5 MG tablet, , Disp: , Rfl:   •  B Complex Vitamins (VITAMIN B COMPLEX 100 IJ), Inject  as directed., Disp: , Rfl:   •  Calcium-Magnesium-Vitamin D (CALCIUM 500 PO), Take  by mouth Daily., Disp: , Rfl:   •  Cholecalciferol (VITAMIN D) 2000 units capsule, Take  by mouth Daily., Disp: , Rfl:   •  ferrous gluconate (FERGON) 324 MG tablet, TAKE 1 TABLET BY MOUTH EVERY DAY WITH BREAKFAST, Disp: 30 tablet, Rfl: 1  •  Loratadine 10 MG capsule, Take  by mouth Daily., Disp: , Rfl:   •  Potassium 99 MG tablet, Take  by mouth Daily., Disp: , Rfl:  "    No Known Allergies    Family History   Problem Relation Age of Onset   • Kidney cancer Father 66   • Leukemia Brother 48   • Multiple myeloma Brother 47     Cancer-related family history includes Kidney cancer (age of onset: 66) in her father.    Social History     Tobacco Use   • Smoking status: Current Every Day Smoker     Packs/day: 0.50     Years: 10.00     Pack years: 5.00     Types: Cigarettes   • Smokeless tobacco: Never Used   • Tobacco comment: started smoking when she was in her 20’s   Substance Use Topics   • Alcohol use: No   • Drug use: No     I have reviewed the history of present illness, past medical history, family history, social history, lab results, all notes and other records since the patient was last seen on 8/8/19.     SUBJECTIVE:   Patient denies any constitutional symptoms.      REVIEW OF SYSTEMS:  12 point system negative except above.    OBJECTIVE:    Vitals:    07/07/22 1410   BP: 157/88   Pulse: 93   Resp: 18   Temp: 98.2 °F (36.8 °C)   SpO2: 98%   Weight: 74.5 kg (164 lb 3.2 oz)   Height: 165.1 cm (65\")   PainSc: 0-No pain     ECOG  (0) Fully active, able to carry on all predisease performance without restriction    Physical Exam   Constitutional: She is oriented to person, place, and time. She appears well-developed. No distress.   HENT:   Head: Normocephalic and atraumatic.   Mouth/Throat: Mucous membranes are moist.   Eyes: Conjunctivae are normal. Right eye exhibits no discharge. Left eye exhibits no discharge. No scleral icterus.   Neck: No thyromegaly present.   Cardiovascular: Normal rate, regular rhythm and normal heart sounds. Exam reveals no gallop and no friction rub.   Pulmonary/Chest: Effort normal. No stridor. No respiratory distress. She has no wheezes.   Left chest wall port   Abdominal: Soft. Bowel sounds are normal. She exhibits no mass. There is no abdominal tenderness. There is no rebound and no guarding.   Musculoskeletal: Normal range of motion. No tenderness. "   Lymphadenopathy:     She has no cervical adenopathy.   Neurological: She is alert and oriented to person, place, and time. She exhibits normal muscle tone.   Skin: Skin is warm. No rash noted. She is not diaphoretic. No erythema.   Psychiatric: Her behavior is normal.   Vitals reviewed.     I have reexamined the patient and the results are consistent with the previously documented exam. Taylor Hernandez MD       RECENT LABS  Lab Results - Last 18 Months   Lab Units 06/29/22  1405 12/09/21  1400 06/03/21  1318   WBC 10*3/mm3 10.98* 9.79 10.02   HEMOGLOBIN g/dL 14.4 14.5 14.2   HEMATOCRIT % 44.2 43.9 43.3   PLATELETS 10*3/mm3 345 327 329   MCV fL 90.6 90.9 92.1     Lab Results - Last 18 Months   Lab Units 06/29/22  1405 12/09/21  1400 06/03/21  1318   SODIUM mmol/L 140 137 139   POTASSIUM mmol/L 3.8 3.6 3.8   CHLORIDE mmol/L 103 102 102   CO2 mmol/L 25.0 27.0 25.0   BUN mg/dL 7* 9 7*   CREATININE mg/dL 0.57 0.71 0.60   CALCIUM mg/dL 8.7 9.0 9.4   BILIRUBIN mg/dL 0.3 0.2 0.3   ALK PHOS U/L 112 110 103   ALT (SGPT) U/L 11 16 12   AST (SGOT) U/L 17 18 16   GLUCOSE mg/dL 92 100* 100*     Lab Results   Component Value Date    GLUCOSE 92 06/29/2022    BUN 7 (L) 06/29/2022    CREATININE 0.57 06/29/2022    EGFRIFNONA 83 12/09/2021    BCR 12.3 06/29/2022    K 3.8 06/29/2022    CO2 25.0 06/29/2022    CALCIUM 8.7 06/29/2022    ALBUMIN 3.90 06/29/2022    LABIL2 0.8 (L) 04/25/2019    AST 17 06/29/2022    ALT 11 06/29/2022     Lab Results   Component Value Date    IRON 57 06/03/2021    TIBC 352 06/03/2021    FERRITIN 128.70 06/03/2021     No results found for: FOLATE  No results found for: OCCULTBLD  No results found for: RETICCTPCT  No results found for: HIEVCIFW15, TSH  No results found for: SPEP, UPEP  LDH   Date Value Ref Range Status   06/29/2022 157 135 - 214 U/L Final     Uric Acid   Date Value Ref Range Status   01/31/2019 2.4 (L) 2.6 - 8.0 mg/dL Final     Lab Results   Component Value Date    SEDRATE 61 (H) 08/08/2019     No  results found for: FIBRINOGEN, HAPTOGLOBIN, DDIMER  No results found for: DDIMER  Lab Results   Component Value Date    INR 2.30 08/06/2019     No results found for:   No results found for: CEA  No results found for:   No results found for: PSA  No results found for: UT6419    Assessment & Plan     There are no diagnoses linked to this encounter.    ASSESSMENT:    1. Diffuse large B-cell lymphoma of lymph nodes of neck (CMS/HCC): Status post 6 cycles of R-CHOP  finished 4/25/2019: Restaging CT scan 5/12/2020 does not show any evidence of lymphadenopathy in the neck chest.  Restaging CT scan on 11/23/2020 negative for recurrence.  Now another CT done in June 2021 with no evidence of disease recurrence.  There is a benign nodule in the lungs which has been stable for 2 years.  No further imaging at this point no s/s of disease recurrence today.   2. Encounter for care related to vascular access port.  Continue port flush.  I discussed with her about removing the port.  Patient has some apprehensions about this.  She will think about this and make appointment with Dr. Mcginnis if she would like it removed.  3. Iron deficiency anemia due to chronic blood loss:  Intermittently taking oral iron.  4. Tobacco use  5. Aortic mural thrombus - was on coumadin initially this was stopped by vascular surgery.   6. Right breast nodule seen on previous CT scan.  No palpable abnormality.  Mammogram normal on 8/3/2020.  Normal mention of the recent CT.  Continue screening mammogram. She has been non compliant will get one scheduled.      PLAN:    · CBC BMP LDH in 6 months in follow-up.  · Possible Port-A-Cath removal if patient chooses to.  · Continue screening mammogram with primary care physician  · Follow-up in 6 months         I have reviewed and confirmed the accuracy of the patient's history: Chief complaint, HPI, ROS and Subjective as entered by the MA/LPN/RN. Taylor Hernandez MD 07/07/22

## 2022-07-07 ENCOUNTER — OFFICE VISIT (OUTPATIENT)
Dept: ONCOLOGY | Facility: CLINIC | Age: 67
End: 2022-07-07

## 2022-07-07 VITALS
OXYGEN SATURATION: 98 % | HEIGHT: 65 IN | RESPIRATION RATE: 18 BRPM | TEMPERATURE: 98.2 F | DIASTOLIC BLOOD PRESSURE: 88 MMHG | WEIGHT: 164.2 LBS | HEART RATE: 93 BPM | SYSTOLIC BLOOD PRESSURE: 157 MMHG | BODY MASS INDEX: 27.36 KG/M2

## 2022-07-07 DIAGNOSIS — C83.31 DIFFUSE LARGE B-CELL LYMPHOMA OF LYMPH NODES OF NECK: Primary | ICD-10-CM

## 2022-07-07 DIAGNOSIS — D50.0 IRON DEFICIENCY ANEMIA DUE TO CHRONIC BLOOD LOSS: ICD-10-CM

## 2022-07-07 PROCEDURE — 99213 OFFICE O/P EST LOW 20 MIN: CPT | Performed by: INTERNAL MEDICINE

## 2022-07-27 ENCOUNTER — HOSPITAL ENCOUNTER (OUTPATIENT)
Dept: ONCOLOGY | Facility: HOSPITAL | Age: 67
Setting detail: INFUSION SERIES
Discharge: HOME | End: 2022-07-27

## 2022-07-27 VITALS — BODY MASS INDEX: 27.36 KG/M2 | WEIGHT: 164.2 LBS | HEIGHT: 65 IN

## 2022-07-27 DIAGNOSIS — C83.31 DIFFUSE LARGE B-CELL LYMPHOMA OF LYMPH NODES OF NECK: ICD-10-CM

## 2022-07-27 DIAGNOSIS — Z45.2 ENCOUNTER FOR CARE RELATED TO VASCULAR ACCESS PORT: Primary | ICD-10-CM

## 2022-07-27 PROCEDURE — 25010000002 HEPARIN LOCK FLUSH PER 10 UNITS: Performed by: INTERNAL MEDICINE

## 2022-07-27 PROCEDURE — 96523 IRRIG DRUG DELIVERY DEVICE: CPT

## 2022-07-27 RX ORDER — SODIUM CHLORIDE 0.9 % (FLUSH) 0.9 %
20 SYRINGE (ML) INJECTION AS NEEDED
Status: CANCELLED | OUTPATIENT
Start: 2022-07-27

## 2022-07-27 RX ORDER — HEPARIN SODIUM (PORCINE) LOCK FLUSH IV SOLN 100 UNIT/ML 100 UNIT/ML
500 SOLUTION INTRAVENOUS AS NEEDED
Status: CANCELLED | OUTPATIENT
Start: 2022-07-27

## 2022-07-27 RX ORDER — HEPARIN SODIUM (PORCINE) LOCK FLUSH IV SOLN 100 UNIT/ML 100 UNIT/ML
500 SOLUTION INTRAVENOUS AS NEEDED
Status: DISCONTINUED | OUTPATIENT
Start: 2022-07-27 | End: 2022-07-28 | Stop reason: HOSPADM

## 2022-07-27 RX ORDER — SODIUM CHLORIDE 0.9 % (FLUSH) 0.9 %
20 SYRINGE (ML) INJECTION AS NEEDED
Status: DISCONTINUED | OUTPATIENT
Start: 2022-07-27 | End: 2022-07-28 | Stop reason: HOSPADM

## 2022-07-27 RX ADMIN — Medication 30 ML: at 15:08

## 2022-07-27 RX ADMIN — HEPARIN 500 UNITS: 100 SYRINGE at 15:10

## 2022-07-27 NOTE — PROGRESS NOTES
Patient her for port flush. Patients port flushed without difficulty. Patient aware of next port flush appointment.

## 2022-08-31 ENCOUNTER — HOSPITAL ENCOUNTER (OUTPATIENT)
Dept: ONCOLOGY | Facility: HOSPITAL | Age: 67
Setting detail: INFUSION SERIES
Discharge: HOME | End: 2022-08-31

## 2022-08-31 DIAGNOSIS — Z45.2 ENCOUNTER FOR CARE RELATED TO VASCULAR ACCESS PORT: Primary | ICD-10-CM

## 2022-08-31 DIAGNOSIS — C83.31 DIFFUSE LARGE B-CELL LYMPHOMA OF LYMPH NODES OF NECK: ICD-10-CM

## 2022-08-31 PROCEDURE — 96523 IRRIG DRUG DELIVERY DEVICE: CPT

## 2022-08-31 PROCEDURE — 25010000002 HEPARIN LOCK FLUSH PER 10 UNITS: Performed by: INTERNAL MEDICINE

## 2022-08-31 RX ORDER — HEPARIN SODIUM (PORCINE) LOCK FLUSH IV SOLN 100 UNIT/ML 100 UNIT/ML
500 SOLUTION INTRAVENOUS AS NEEDED
Status: DISCONTINUED | OUTPATIENT
Start: 2022-08-31 | End: 2022-09-01 | Stop reason: HOSPADM

## 2022-08-31 RX ORDER — SODIUM CHLORIDE 0.9 % (FLUSH) 0.9 %
20 SYRINGE (ML) INJECTION AS NEEDED
Status: DISCONTINUED | OUTPATIENT
Start: 2022-08-31 | End: 2022-09-01 | Stop reason: HOSPADM

## 2022-08-31 RX ORDER — SODIUM CHLORIDE 0.9 % (FLUSH) 0.9 %
20 SYRINGE (ML) INJECTION AS NEEDED
Status: CANCELLED | OUTPATIENT
Start: 2022-08-31

## 2022-08-31 RX ORDER — HEPARIN SODIUM (PORCINE) LOCK FLUSH IV SOLN 100 UNIT/ML 100 UNIT/ML
500 SOLUTION INTRAVENOUS AS NEEDED
Status: CANCELLED | OUTPATIENT
Start: 2022-08-31

## 2022-08-31 RX ADMIN — HEPARIN 500 UNITS: 100 SYRINGE at 14:50

## 2022-08-31 RX ADMIN — Medication 30 ML: at 14:48

## 2022-08-31 NOTE — PROGRESS NOTES
Patient here for port flush.Port flushed without difficulty after I obtained good blood return. Patient aware of next appointment.

## 2022-10-05 ENCOUNTER — HOSPITAL ENCOUNTER (OUTPATIENT)
Dept: ONCOLOGY | Facility: HOSPITAL | Age: 67
Setting detail: INFUSION SERIES
Discharge: HOME | End: 2022-10-05

## 2022-10-05 DIAGNOSIS — Z45.2 ENCOUNTER FOR CARE RELATED TO VASCULAR ACCESS PORT: Primary | ICD-10-CM

## 2022-10-05 DIAGNOSIS — C83.31 DIFFUSE LARGE B-CELL LYMPHOMA OF LYMPH NODES OF NECK: ICD-10-CM

## 2022-10-05 PROCEDURE — 25010000002 HEPARIN LOCK FLUSH PER 10 UNITS: Performed by: INTERNAL MEDICINE

## 2022-10-05 PROCEDURE — 96523 IRRIG DRUG DELIVERY DEVICE: CPT

## 2022-10-05 RX ORDER — SODIUM CHLORIDE 0.9 % (FLUSH) 0.9 %
20 SYRINGE (ML) INJECTION AS NEEDED
Status: CANCELLED | OUTPATIENT
Start: 2022-10-05

## 2022-10-05 RX ORDER — SODIUM CHLORIDE 0.9 % (FLUSH) 0.9 %
20 SYRINGE (ML) INJECTION AS NEEDED
Status: DISCONTINUED | OUTPATIENT
Start: 2022-10-05 | End: 2022-10-06 | Stop reason: HOSPADM

## 2022-10-05 RX ORDER — HEPARIN SODIUM (PORCINE) LOCK FLUSH IV SOLN 100 UNIT/ML 100 UNIT/ML
500 SOLUTION INTRAVENOUS AS NEEDED
Status: DISCONTINUED | OUTPATIENT
Start: 2022-10-05 | End: 2022-10-06 | Stop reason: HOSPADM

## 2022-10-05 RX ORDER — HEPARIN SODIUM (PORCINE) LOCK FLUSH IV SOLN 100 UNIT/ML 100 UNIT/ML
500 SOLUTION INTRAVENOUS AS NEEDED
Status: CANCELLED | OUTPATIENT
Start: 2022-10-05

## 2022-10-05 RX ADMIN — HEPARIN 500 UNITS: 100 SYRINGE at 14:06

## 2022-10-05 RX ADMIN — Medication 30 ML: at 14:04

## 2022-11-02 ENCOUNTER — HOSPITAL ENCOUNTER (OUTPATIENT)
Dept: ONCOLOGY | Facility: HOSPITAL | Age: 67
Setting detail: INFUSION SERIES
Discharge: HOME | End: 2022-11-02

## 2022-11-02 DIAGNOSIS — Z45.2 ENCOUNTER FOR CARE RELATED TO VASCULAR ACCESS PORT: Primary | ICD-10-CM

## 2022-11-02 DIAGNOSIS — C83.31 DIFFUSE LARGE B-CELL LYMPHOMA OF LYMPH NODES OF NECK: ICD-10-CM

## 2022-11-02 PROCEDURE — 96523 IRRIG DRUG DELIVERY DEVICE: CPT

## 2022-11-02 PROCEDURE — 25010000002 HEPARIN LOCK FLUSH PER 10 UNITS: Performed by: INTERNAL MEDICINE

## 2022-11-02 RX ORDER — HEPARIN SODIUM (PORCINE) LOCK FLUSH IV SOLN 100 UNIT/ML 100 UNIT/ML
500 SOLUTION INTRAVENOUS AS NEEDED
Status: DISCONTINUED | OUTPATIENT
Start: 2022-11-02 | End: 2022-11-03 | Stop reason: HOSPADM

## 2022-11-02 RX ORDER — HEPARIN SODIUM (PORCINE) LOCK FLUSH IV SOLN 100 UNIT/ML 100 UNIT/ML
500 SOLUTION INTRAVENOUS AS NEEDED
Status: CANCELLED | OUTPATIENT
Start: 2022-11-02

## 2022-11-02 RX ORDER — SODIUM CHLORIDE 0.9 % (FLUSH) 0.9 %
20 SYRINGE (ML) INJECTION AS NEEDED
Status: DISCONTINUED | OUTPATIENT
Start: 2022-11-02 | End: 2022-11-03 | Stop reason: HOSPADM

## 2022-11-02 RX ORDER — SODIUM CHLORIDE 0.9 % (FLUSH) 0.9 %
20 SYRINGE (ML) INJECTION AS NEEDED
Status: CANCELLED | OUTPATIENT
Start: 2022-11-02

## 2022-11-02 RX ADMIN — Medication 30 ML: at 14:05

## 2022-11-02 RX ADMIN — HEPARIN 500 UNITS: 100 SYRINGE at 14:07

## 2022-12-14 ENCOUNTER — HOSPITAL ENCOUNTER (OUTPATIENT)
Dept: ONCOLOGY | Facility: HOSPITAL | Age: 67
Setting detail: INFUSION SERIES
Discharge: HOME | End: 2022-12-14

## 2022-12-14 DIAGNOSIS — C83.31 DIFFUSE LARGE B-CELL LYMPHOMA OF LYMPH NODES OF NECK: ICD-10-CM

## 2022-12-14 DIAGNOSIS — D50.0 IRON DEFICIENCY ANEMIA DUE TO CHRONIC BLOOD LOSS: ICD-10-CM

## 2022-12-14 DIAGNOSIS — Z45.2 ENCOUNTER FOR CARE RELATED TO VASCULAR ACCESS PORT: Primary | ICD-10-CM

## 2022-12-14 LAB
ALBUMIN SERPL-MCNC: 3.8 G/DL (ref 3.5–5.2)
ALBUMIN/GLOB SERPL: 1.3 G/DL
ALP SERPL-CCNC: 110 U/L (ref 39–117)
ALT SERPL W P-5'-P-CCNC: 10 U/L (ref 1–33)
ANION GAP SERPL CALCULATED.3IONS-SCNC: 10 MMOL/L (ref 5–15)
AST SERPL-CCNC: 14 U/L (ref 1–32)
BASOPHILS # BLD AUTO: 0.03 10*3/MM3 (ref 0–0.2)
BASOPHILS NFR BLD AUTO: 0.3 % (ref 0–1.5)
BILIRUB SERPL-MCNC: 0.3 MG/DL (ref 0–1.2)
BUN SERPL-MCNC: 8 MG/DL (ref 8–23)
BUN/CREAT SERPL: 13.3 (ref 7–25)
CALCIUM SPEC-SCNC: 8.8 MG/DL (ref 8.6–10.5)
CHLORIDE SERPL-SCNC: 101 MMOL/L (ref 98–107)
CO2 SERPL-SCNC: 27 MMOL/L (ref 22–29)
CREAT SERPL-MCNC: 0.6 MG/DL (ref 0.57–1)
DEPRECATED RDW RBC AUTO: 44.7 FL (ref 37–54)
EGFRCR SERPLBLD CKD-EPI 2021: 99.8 ML/MIN/1.73
EOSINOPHIL # BLD AUTO: 0.18 10*3/MM3 (ref 0–0.4)
EOSINOPHIL NFR BLD AUTO: 1.8 % (ref 0.3–6.2)
ERYTHROCYTE [DISTWIDTH] IN BLOOD BY AUTOMATED COUNT: 13.6 % (ref 12.3–15.4)
GLOBULIN UR ELPH-MCNC: 3 GM/DL
GLUCOSE SERPL-MCNC: 122 MG/DL (ref 65–99)
HCT VFR BLD AUTO: 41.3 % (ref 34–46.6)
HGB BLD-MCNC: 13.7 G/DL (ref 12–15.9)
LDH SERPL-CCNC: 124 U/L (ref 135–214)
LYMPHOCYTES # BLD AUTO: 2.31 10*3/MM3 (ref 0.7–3.1)
LYMPHOCYTES NFR BLD AUTO: 23.4 % (ref 19.6–45.3)
MCH RBC QN AUTO: 30.2 PG (ref 26.6–33)
MCHC RBC AUTO-ENTMCNC: 33.2 G/DL (ref 31.5–35.7)
MCV RBC AUTO: 91.2 FL (ref 79–97)
MONOCYTES # BLD AUTO: 0.84 10*3/MM3 (ref 0.1–0.9)
MONOCYTES NFR BLD AUTO: 8.5 % (ref 5–12)
NEUTROPHILS NFR BLD AUTO: 6.5 10*3/MM3 (ref 1.7–7)
NEUTROPHILS NFR BLD AUTO: 66 % (ref 42.7–76)
PLATELET # BLD AUTO: 281 10*3/MM3 (ref 140–450)
PMV BLD AUTO: 10 FL (ref 6–12)
POTASSIUM SERPL-SCNC: 3.7 MMOL/L (ref 3.5–5.2)
PROT SERPL-MCNC: 6.8 G/DL (ref 6–8.5)
RBC # BLD AUTO: 4.53 10*6/MM3 (ref 3.77–5.28)
SODIUM SERPL-SCNC: 138 MMOL/L (ref 136–145)
WBC NRBC COR # BLD: 9.86 10*3/MM3 (ref 3.4–10.8)

## 2022-12-14 PROCEDURE — 36591 DRAW BLOOD OFF VENOUS DEVICE: CPT

## 2022-12-14 PROCEDURE — 80053 COMPREHEN METABOLIC PANEL: CPT | Performed by: INTERNAL MEDICINE

## 2022-12-14 PROCEDURE — 96523 IRRIG DRUG DELIVERY DEVICE: CPT

## 2022-12-14 PROCEDURE — 83615 LACTATE (LD) (LDH) ENZYME: CPT | Performed by: INTERNAL MEDICINE

## 2022-12-14 PROCEDURE — 85025 COMPLETE CBC W/AUTO DIFF WBC: CPT | Performed by: INTERNAL MEDICINE

## 2022-12-14 PROCEDURE — 25010000002 HEPARIN LOCK FLUSH PER 10 UNITS: Performed by: INTERNAL MEDICINE

## 2022-12-14 RX ORDER — SODIUM CHLORIDE 0.9 % (FLUSH) 0.9 %
20 SYRINGE (ML) INJECTION AS NEEDED
Status: DISCONTINUED | OUTPATIENT
Start: 2022-12-14 | End: 2022-12-15 | Stop reason: HOSPADM

## 2022-12-14 RX ORDER — HEPARIN SODIUM (PORCINE) LOCK FLUSH IV SOLN 100 UNIT/ML 100 UNIT/ML
500 SOLUTION INTRAVENOUS AS NEEDED
Status: CANCELLED | OUTPATIENT
Start: 2022-12-14

## 2022-12-14 RX ORDER — HEPARIN SODIUM (PORCINE) LOCK FLUSH IV SOLN 100 UNIT/ML 100 UNIT/ML
500 SOLUTION INTRAVENOUS AS NEEDED
Status: DISCONTINUED | OUTPATIENT
Start: 2022-12-14 | End: 2022-12-15 | Stop reason: HOSPADM

## 2022-12-14 RX ORDER — SODIUM CHLORIDE 0.9 % (FLUSH) 0.9 %
20 SYRINGE (ML) INJECTION AS NEEDED
Status: CANCELLED | OUTPATIENT
Start: 2022-12-14

## 2022-12-14 RX ADMIN — HEPARIN 500 UNITS: 100 SYRINGE at 14:17

## 2022-12-14 RX ADMIN — Medication 10 ML: at 14:20

## 2022-12-14 NOTE — PROGRESS NOTES
PT here for port flush and lab draw. Port accessed and flushed with good blood return noted. 10cc of blood wasted prior to specimen collection. Blood specimen obtained and sent to lab for processing per protocol.  Port flushed with saline and heparin prior to needle removal.

## 2023-01-05 ENCOUNTER — APPOINTMENT (OUTPATIENT)
Dept: ONCOLOGY | Facility: CLINIC | Age: 68
End: 2023-01-05

## 2023-01-23 ENCOUNTER — HOSPITAL ENCOUNTER (OUTPATIENT)
Dept: ONCOLOGY | Facility: HOSPITAL | Age: 68
Setting detail: INFUSION SERIES
Discharge: HOME | End: 2023-01-23
Payer: MEDICARE

## 2023-01-23 DIAGNOSIS — C83.31 DIFFUSE LARGE B-CELL LYMPHOMA OF LYMPH NODES OF NECK: ICD-10-CM

## 2023-01-23 DIAGNOSIS — Z45.2 ENCOUNTER FOR CARE RELATED TO VASCULAR ACCESS PORT: Primary | ICD-10-CM

## 2023-01-23 PROCEDURE — 96523 IRRIG DRUG DELIVERY DEVICE: CPT

## 2023-01-23 PROCEDURE — 25010000002 HEPARIN LOCK FLUSH PER 10 UNITS: Performed by: INTERNAL MEDICINE

## 2023-01-23 RX ORDER — SODIUM CHLORIDE 0.9 % (FLUSH) 0.9 %
20 SYRINGE (ML) INJECTION AS NEEDED
Status: CANCELLED | OUTPATIENT
Start: 2023-01-23

## 2023-01-23 RX ORDER — HEPARIN SODIUM (PORCINE) LOCK FLUSH IV SOLN 100 UNIT/ML 100 UNIT/ML
500 SOLUTION INTRAVENOUS AS NEEDED
Status: DISCONTINUED | OUTPATIENT
Start: 2023-01-23 | End: 2023-01-24 | Stop reason: HOSPADM

## 2023-01-23 RX ORDER — SODIUM CHLORIDE 0.9 % (FLUSH) 0.9 %
20 SYRINGE (ML) INJECTION AS NEEDED
Status: DISCONTINUED | OUTPATIENT
Start: 2023-01-23 | End: 2023-01-24 | Stop reason: HOSPADM

## 2023-01-23 RX ORDER — HEPARIN SODIUM (PORCINE) LOCK FLUSH IV SOLN 100 UNIT/ML 100 UNIT/ML
500 SOLUTION INTRAVENOUS AS NEEDED
Status: CANCELLED | OUTPATIENT
Start: 2023-01-23

## 2023-01-23 RX ADMIN — Medication 20 ML: at 10:55

## 2023-01-23 RX ADMIN — HEPARIN 500 UNITS: 100 SYRINGE at 10:57

## 2023-02-02 ENCOUNTER — OFFICE VISIT (OUTPATIENT)
Dept: ONCOLOGY | Facility: CLINIC | Age: 68
End: 2023-02-02
Payer: MEDICARE

## 2023-02-02 VITALS
HEIGHT: 65 IN | RESPIRATION RATE: 16 BRPM | OXYGEN SATURATION: 98 % | DIASTOLIC BLOOD PRESSURE: 91 MMHG | HEART RATE: 92 BPM | SYSTOLIC BLOOD PRESSURE: 163 MMHG | TEMPERATURE: 97.7 F | BODY MASS INDEX: 27.69 KG/M2 | WEIGHT: 166.2 LBS

## 2023-02-02 DIAGNOSIS — C83.31 DIFFUSE LARGE B-CELL LYMPHOMA OF LYMPH NODES OF NECK: Primary | ICD-10-CM

## 2023-02-02 PROCEDURE — 99213 OFFICE O/P EST LOW 20 MIN: CPT | Performed by: INTERNAL MEDICINE

## 2023-03-06 ENCOUNTER — HOSPITAL ENCOUNTER (OUTPATIENT)
Dept: ONCOLOGY | Facility: HOSPITAL | Age: 68
Discharge: HOME | End: 2023-03-06
Admitting: INTERNAL MEDICINE
Payer: MEDICARE

## 2023-03-06 DIAGNOSIS — C83.31 DIFFUSE LARGE B-CELL LYMPHOMA OF LYMPH NODES OF NECK: ICD-10-CM

## 2023-03-06 DIAGNOSIS — Z45.2 ENCOUNTER FOR CARE RELATED TO VASCULAR ACCESS PORT: Primary | ICD-10-CM

## 2023-03-06 PROCEDURE — 96523 IRRIG DRUG DELIVERY DEVICE: CPT

## 2023-03-06 PROCEDURE — 25010000002 HEPARIN LOCK FLUSH PER 10 UNITS: Performed by: INTERNAL MEDICINE

## 2023-03-06 RX ORDER — SODIUM CHLORIDE 0.9 % (FLUSH) 0.9 %
20 SYRINGE (ML) INJECTION AS NEEDED
Status: DISCONTINUED | OUTPATIENT
Start: 2023-03-06 | End: 2023-03-07 | Stop reason: HOSPADM

## 2023-03-06 RX ORDER — HEPARIN SODIUM (PORCINE) LOCK FLUSH IV SOLN 100 UNIT/ML 100 UNIT/ML
500 SOLUTION INTRAVENOUS AS NEEDED
Status: DISCONTINUED | OUTPATIENT
Start: 2023-03-06 | End: 2023-03-07 | Stop reason: HOSPADM

## 2023-03-06 RX ORDER — SODIUM CHLORIDE 0.9 % (FLUSH) 0.9 %
20 SYRINGE (ML) INJECTION AS NEEDED
OUTPATIENT
Start: 2023-03-06

## 2023-03-06 RX ORDER — HEPARIN SODIUM (PORCINE) LOCK FLUSH IV SOLN 100 UNIT/ML 100 UNIT/ML
500 SOLUTION INTRAVENOUS AS NEEDED
OUTPATIENT
Start: 2023-03-06

## 2023-03-06 RX ADMIN — HEPARIN 500 UNITS: 100 SYRINGE at 14:12

## 2023-03-06 RX ADMIN — Medication 20 ML: at 14:10

## 2023-04-10 ENCOUNTER — HOSPITAL ENCOUNTER (OUTPATIENT)
Dept: ONCOLOGY | Facility: HOSPITAL | Age: 68
Discharge: HOME | End: 2023-04-10
Admitting: INTERNAL MEDICINE
Payer: MEDICARE

## 2023-04-10 DIAGNOSIS — C83.31 DIFFUSE LARGE B-CELL LYMPHOMA OF LYMPH NODES OF NECK: ICD-10-CM

## 2023-04-10 DIAGNOSIS — Z45.2 ENCOUNTER FOR CARE RELATED TO VASCULAR ACCESS PORT: Primary | ICD-10-CM

## 2023-04-10 PROCEDURE — 25010000002 HEPARIN LOCK FLUSH PER 10 UNITS: Performed by: INTERNAL MEDICINE

## 2023-04-10 PROCEDURE — 96523 IRRIG DRUG DELIVERY DEVICE: CPT

## 2023-04-10 RX ORDER — HEPARIN SODIUM (PORCINE) LOCK FLUSH IV SOLN 100 UNIT/ML 100 UNIT/ML
500 SOLUTION INTRAVENOUS AS NEEDED
OUTPATIENT
Start: 2023-04-10

## 2023-04-10 RX ORDER — SODIUM CHLORIDE 0.9 % (FLUSH) 0.9 %
20 SYRINGE (ML) INJECTION AS NEEDED
Status: DISCONTINUED | OUTPATIENT
Start: 2023-04-10 | End: 2023-04-11 | Stop reason: HOSPADM

## 2023-04-10 RX ORDER — HEPARIN SODIUM (PORCINE) LOCK FLUSH IV SOLN 100 UNIT/ML 100 UNIT/ML
500 SOLUTION INTRAVENOUS AS NEEDED
Status: DISCONTINUED | OUTPATIENT
Start: 2023-04-10 | End: 2023-04-11 | Stop reason: HOSPADM

## 2023-04-10 RX ORDER — SODIUM CHLORIDE 0.9 % (FLUSH) 0.9 %
20 SYRINGE (ML) INJECTION AS NEEDED
OUTPATIENT
Start: 2023-04-10

## 2023-04-10 RX ADMIN — HEPARIN 500 UNITS: 100 SYRINGE at 14:12

## 2023-04-10 RX ADMIN — Medication 20 ML: at 14:10

## 2023-05-15 ENCOUNTER — HOSPITAL ENCOUNTER (OUTPATIENT)
Dept: ONCOLOGY | Facility: HOSPITAL | Age: 68
Discharge: HOME | End: 2023-05-15
Admitting: INTERNAL MEDICINE
Payer: MEDICARE

## 2023-05-15 DIAGNOSIS — Z45.2 ENCOUNTER FOR CARE RELATED TO VASCULAR ACCESS PORT: Primary | ICD-10-CM

## 2023-05-15 DIAGNOSIS — C83.31 DIFFUSE LARGE B-CELL LYMPHOMA OF LYMPH NODES OF NECK: ICD-10-CM

## 2023-05-15 PROCEDURE — 96523 IRRIG DRUG DELIVERY DEVICE: CPT

## 2023-05-15 PROCEDURE — 25010000002 HEPARIN LOCK FLUSH PER 10 UNITS: Performed by: INTERNAL MEDICINE

## 2023-05-15 RX ORDER — HEPARIN SODIUM (PORCINE) LOCK FLUSH IV SOLN 100 UNIT/ML 100 UNIT/ML
500 SOLUTION INTRAVENOUS AS NEEDED
OUTPATIENT
Start: 2023-05-15

## 2023-05-15 RX ORDER — HEPARIN SODIUM (PORCINE) LOCK FLUSH IV SOLN 100 UNIT/ML 100 UNIT/ML
500 SOLUTION INTRAVENOUS AS NEEDED
Status: DISCONTINUED | OUTPATIENT
Start: 2023-05-15 | End: 2023-05-16 | Stop reason: HOSPADM

## 2023-05-15 RX ORDER — SODIUM CHLORIDE 0.9 % (FLUSH) 0.9 %
20 SYRINGE (ML) INJECTION AS NEEDED
Status: DISCONTINUED | OUTPATIENT
Start: 2023-05-15 | End: 2023-05-16 | Stop reason: HOSPADM

## 2023-05-15 RX ORDER — SODIUM CHLORIDE 0.9 % (FLUSH) 0.9 %
20 SYRINGE (ML) INJECTION AS NEEDED
OUTPATIENT
Start: 2023-05-15

## 2023-05-15 RX ADMIN — Medication 20 ML: at 14:04

## 2023-05-15 RX ADMIN — HEPARIN 500 UNITS: 100 SYRINGE at 14:06

## 2023-06-19 ENCOUNTER — HOSPITAL ENCOUNTER (OUTPATIENT)
Dept: ONCOLOGY | Facility: HOSPITAL | Age: 68
Discharge: HOME | End: 2023-06-19
Admitting: INTERNAL MEDICINE
Payer: MEDICARE

## 2023-06-19 DIAGNOSIS — Z45.2 ENCOUNTER FOR CARE RELATED TO VASCULAR ACCESS PORT: ICD-10-CM

## 2023-06-19 DIAGNOSIS — C83.31 DIFFUSE LARGE B-CELL LYMPHOMA OF LYMPH NODES OF NECK: Primary | ICD-10-CM

## 2023-06-19 PROCEDURE — 96523 IRRIG DRUG DELIVERY DEVICE: CPT

## 2023-06-19 PROCEDURE — 25010000002 HEPARIN LOCK FLUSH PER 10 UNITS: Performed by: INTERNAL MEDICINE

## 2023-06-19 RX ORDER — SODIUM CHLORIDE 0.9 % (FLUSH) 0.9 %
20 SYRINGE (ML) INJECTION AS NEEDED
Status: DISCONTINUED | OUTPATIENT
Start: 2023-06-19 | End: 2023-06-20 | Stop reason: HOSPADM

## 2023-06-19 RX ORDER — HEPARIN SODIUM (PORCINE) LOCK FLUSH IV SOLN 100 UNIT/ML 100 UNIT/ML
500 SOLUTION INTRAVENOUS AS NEEDED
OUTPATIENT
Start: 2023-06-19

## 2023-06-19 RX ORDER — HEPARIN SODIUM (PORCINE) LOCK FLUSH IV SOLN 100 UNIT/ML 100 UNIT/ML
500 SOLUTION INTRAVENOUS AS NEEDED
Status: DISCONTINUED | OUTPATIENT
Start: 2023-06-19 | End: 2023-06-20 | Stop reason: HOSPADM

## 2023-06-19 RX ORDER — SODIUM CHLORIDE 0.9 % (FLUSH) 0.9 %
20 SYRINGE (ML) INJECTION AS NEEDED
OUTPATIENT
Start: 2023-06-19

## 2023-06-19 RX ADMIN — HEPARIN 500 UNITS: 100 SYRINGE at 14:14

## 2023-06-19 RX ADMIN — Medication 20 ML: at 14:12

## 2023-06-19 NOTE — PROGRESS NOTES
Patient here for port flush. Port flushed without difficulty after obtained a good blood return. Patient aware of next appointment

## 2023-07-31 ENCOUNTER — HOSPITAL ENCOUNTER (OUTPATIENT)
Dept: ONCOLOGY | Facility: HOSPITAL | Age: 68
Discharge: HOME | End: 2023-07-31
Admitting: INTERNAL MEDICINE
Payer: MEDICARE

## 2023-07-31 DIAGNOSIS — Z45.2 ENCOUNTER FOR CARE RELATED TO VASCULAR ACCESS PORT: ICD-10-CM

## 2023-07-31 DIAGNOSIS — C83.31 DIFFUSE LARGE B-CELL LYMPHOMA OF LYMPH NODES OF NECK: Primary | ICD-10-CM

## 2023-07-31 LAB
ALBUMIN SERPL-MCNC: 3.7 G/DL (ref 3.5–5.2)
ALBUMIN/GLOB SERPL: 1.1 G/DL
ALP SERPL-CCNC: 110 U/L (ref 39–117)
ALT SERPL W P-5'-P-CCNC: 11 U/L (ref 1–33)
ANION GAP SERPL CALCULATED.3IONS-SCNC: 9 MMOL/L (ref 5–15)
AST SERPL-CCNC: 14 U/L (ref 1–32)
BASOPHILS # BLD AUTO: 0.04 10*3/MM3 (ref 0–0.2)
BASOPHILS NFR BLD AUTO: 0.4 % (ref 0–1.5)
BILIRUB SERPL-MCNC: 0.4 MG/DL (ref 0–1.2)
BUN SERPL-MCNC: 10 MG/DL (ref 8–23)
BUN/CREAT SERPL: 7.4 (ref 7–25)
CALCIUM SPEC-SCNC: 9.1 MG/DL (ref 8.6–10.5)
CHLORIDE SERPL-SCNC: 101 MMOL/L (ref 98–107)
CO2 SERPL-SCNC: 27 MMOL/L (ref 22–29)
CREAT SERPL-MCNC: 1.35 MG/DL (ref 0.57–1)
DEPRECATED RDW RBC AUTO: 46.2 FL (ref 37–54)
EGFRCR SERPLBLD CKD-EPI 2021: 43.4 ML/MIN/1.73
EOSINOPHIL # BLD AUTO: 0.24 10*3/MM3 (ref 0–0.4)
EOSINOPHIL NFR BLD AUTO: 2.3 % (ref 0.3–6.2)
ERYTHROCYTE [DISTWIDTH] IN BLOOD BY AUTOMATED COUNT: 14.6 % (ref 12.3–15.4)
GLOBULIN UR ELPH-MCNC: 3.3 GM/DL
GLUCOSE SERPL-MCNC: 80 MG/DL (ref 65–99)
HCT VFR BLD AUTO: 41.3 % (ref 34–46.6)
HGB BLD-MCNC: 13.3 G/DL (ref 12–15.9)
LDH SERPL-CCNC: 172 U/L (ref 135–214)
LYMPHOCYTES # BLD AUTO: 2.23 10*3/MM3 (ref 0.7–3.1)
LYMPHOCYTES NFR BLD AUTO: 21.7 % (ref 19.6–45.3)
MCH RBC QN AUTO: 28.2 PG (ref 26.6–33)
MCHC RBC AUTO-ENTMCNC: 32.2 G/DL (ref 31.5–35.7)
MCV RBC AUTO: 87.5 FL (ref 79–97)
MONOCYTES # BLD AUTO: 0.79 10*3/MM3 (ref 0.1–0.9)
MONOCYTES NFR BLD AUTO: 7.7 % (ref 5–12)
NEUTROPHILS NFR BLD AUTO: 6.98 10*3/MM3 (ref 1.7–7)
NEUTROPHILS NFR BLD AUTO: 67.9 % (ref 42.7–76)
PLATELET # BLD AUTO: 335 10*3/MM3 (ref 140–450)
PMV BLD AUTO: 10.1 FL (ref 6–12)
POTASSIUM SERPL-SCNC: 4.1 MMOL/L (ref 3.5–5.2)
PROT SERPL-MCNC: 7 G/DL (ref 6–8.5)
RBC # BLD AUTO: 4.72 10*6/MM3 (ref 3.77–5.28)
SODIUM SERPL-SCNC: 137 MMOL/L (ref 136–145)
WBC NRBC COR # BLD: 10.28 10*3/MM3 (ref 3.4–10.8)

## 2023-07-31 PROCEDURE — 25010000002 HEPARIN LOCK FLUSH PER 10 UNITS: Performed by: INTERNAL MEDICINE

## 2023-07-31 PROCEDURE — 85025 COMPLETE CBC W/AUTO DIFF WBC: CPT | Performed by: INTERNAL MEDICINE

## 2023-07-31 PROCEDURE — 36591 DRAW BLOOD OFF VENOUS DEVICE: CPT

## 2023-07-31 PROCEDURE — 83615 LACTATE (LD) (LDH) ENZYME: CPT | Performed by: INTERNAL MEDICINE

## 2023-07-31 PROCEDURE — 80053 COMPREHEN METABOLIC PANEL: CPT | Performed by: INTERNAL MEDICINE

## 2023-07-31 RX ORDER — SODIUM CHLORIDE 0.9 % (FLUSH) 0.9 %
20 SYRINGE (ML) INJECTION AS NEEDED
OUTPATIENT
Start: 2023-07-31

## 2023-07-31 RX ORDER — HEPARIN SODIUM (PORCINE) LOCK FLUSH IV SOLN 100 UNIT/ML 100 UNIT/ML
500 SOLUTION INTRAVENOUS AS NEEDED
Status: DISCONTINUED | OUTPATIENT
Start: 2023-07-31 | End: 2023-08-01 | Stop reason: HOSPADM

## 2023-07-31 RX ORDER — SODIUM CHLORIDE 0.9 % (FLUSH) 0.9 %
20 SYRINGE (ML) INJECTION AS NEEDED
Status: DISCONTINUED | OUTPATIENT
Start: 2023-07-31 | End: 2023-08-01 | Stop reason: HOSPADM

## 2023-07-31 RX ORDER — HEPARIN SODIUM (PORCINE) LOCK FLUSH IV SOLN 100 UNIT/ML 100 UNIT/ML
500 SOLUTION INTRAVENOUS AS NEEDED
OUTPATIENT
Start: 2023-07-31

## 2023-07-31 RX ADMIN — HEPARIN 500 UNITS: 100 SYRINGE at 14:40

## 2023-07-31 RX ADMIN — Medication 20 ML: at 14:38

## 2023-08-03 ENCOUNTER — OFFICE VISIT (OUTPATIENT)
Dept: ONCOLOGY | Facility: CLINIC | Age: 68
End: 2023-08-03
Payer: MEDICARE

## 2023-08-03 ENCOUNTER — CLINICAL SUPPORT (OUTPATIENT)
Dept: FAMILY MEDICINE CLINIC | Facility: CLINIC | Age: 68
End: 2023-08-03
Payer: MEDICARE

## 2023-08-03 VITALS
RESPIRATION RATE: 16 BRPM | TEMPERATURE: 98.6 F | WEIGHT: 153.2 LBS | HEIGHT: 65 IN | SYSTOLIC BLOOD PRESSURE: 136 MMHG | OXYGEN SATURATION: 98 % | HEART RATE: 91 BPM | BODY MASS INDEX: 25.52 KG/M2 | DIASTOLIC BLOOD PRESSURE: 82 MMHG

## 2023-08-03 DIAGNOSIS — C83.31 DIFFUSE LARGE B-CELL LYMPHOMA OF LYMPH NODES OF NECK: ICD-10-CM

## 2023-08-03 DIAGNOSIS — C83.31 DIFFUSE LARGE B-CELL LYMPHOMA OF LYMPH NODES OF NECK: Primary | ICD-10-CM

## 2023-08-03 LAB
ANION GAP SERPL CALCULATED.3IONS-SCNC: 11 MMOL/L (ref 5–15)
BUN SERPL-MCNC: 11 MG/DL (ref 8–23)
BUN/CREAT SERPL: 14.1 (ref 7–25)
CALCIUM SPEC-SCNC: 9.7 MG/DL (ref 8.6–10.5)
CHLORIDE SERPL-SCNC: 98 MMOL/L (ref 98–107)
CO2 SERPL-SCNC: 28 MMOL/L (ref 22–29)
CREAT SERPL-MCNC: 0.78 MG/DL (ref 0.57–1)
EGFRCR SERPLBLD CKD-EPI 2021: 83.9 ML/MIN/1.73
GLUCOSE SERPL-MCNC: 70 MG/DL (ref 65–99)
POTASSIUM SERPL-SCNC: 3.9 MMOL/L (ref 3.5–5.2)
SODIUM SERPL-SCNC: 137 MMOL/L (ref 136–145)

## 2023-08-03 PROCEDURE — 36415 COLL VENOUS BLD VENIPUNCTURE: CPT | Performed by: INTERNAL MEDICINE

## 2023-08-03 PROCEDURE — 80048 BASIC METABOLIC PNL TOTAL CA: CPT | Performed by: INTERNAL MEDICINE

## 2023-09-20 NOTE — PROGRESS NOTES
Hematology/Oncology Outpatient Follow Up    PATIENT NAME:Sarah Borja  :1955  MRN: [id number]  PRIMARY CARE PHYSICIAN: Miguel Lezama MD  REFERRING PHYSICIAN: No ref. provider found    No chief complaint on file.       HISTORY OF PRESENT ILLNESS:   Right tonsillar diffuse large B-cell lymphoma diagnosed 2018.  This  female who claims to have developed a sore throat in 2018. She saw her primary care provider, Tran Vaz N.P. and was prescribed antibiotic. She did have palpable lymph nodes and a CT scan of the soft tissue neck was performed on 10/11/18 revealing asymmetric enlargement of the right palatine tonsil. There was right jugular digastric and posterior triangle adenopathy. The largest lymph node within the posterior triangle measured approximately 3.6 x 3.2 x 2.0 cm. She was referred to Sly Rosario M.D. and was seen by him in initial consultation on 18 where laryngoscopy revealed right tonsillar mass. FNA was performed on in on 10/31/18 with specimen sent for pathology, but routine staining was not performed. Flow cytometry revealed a CD10 positive monoclonal B-cell population which by light scatter analysis fell into both the small cell and large cell regions, though predominantly into the large cell region. FISH analysis for BCL2, BCL6 and MYC were negative. Though Dr. Rosario had requested routine staining of the specimen, this was not performed and Pathology had wanted a more fresh specimen, prior to which she was referred to me for consultation. The patient today is denying any weight loss. She does complain of low-grade fevers controlled with Aspirin and claims to have had night sweats on and off for 10 years.   18 - [facility] cell non-Hodgkin's lymphoma of the right tonsil on referral by Sly Rosario M.D. WBC 10.9 with 69% neutrophils, 23% lymphocytes, 6% monocytes, hemoglobin 13.8,  platelet count 408,000.  ().  Globulin 4.1 (2.5-3.8), uric acid 5.0 (2.6-8.0), ESR 61 (0-30).      11/16/18 - Bone marrow aspiration and biopsy with normocellular marrow with maturing trilineage hematopoiesis. Immunohistochemistry had no increase in CD34 positive blasts or CD20 positive B-lymphocytes. Flow cytometry had no evidence of an abnormal cell type. Cytogenetics revealed 48 XX normal female karyotype. Echocardiogram with mild mitral and mild tricuspid regurgitation, moderate concentric left ventricular hypertrophy and LV systolic function normal with EF about 65-70%.   11/17/18 - Echocardiogram with mild mitral and tricuspid regurgitation, moderate concentric left ventricular hypertrophy, LV systolic function normal with EF about 65-70%.   11/19/18 - Chest x-ray with clear lungs.   11/20/18 - PET scan with extensive right neck lymphadenopathy beginning at the right tonsillar pillar and continuing to the supraclavicular region with SUV between 26 and 16 with extremely hypermetabolic lymph nodes, changes of cholelithiasis and old healed granulomatous disease. Mass of the nodes at L2 are approximately 6.5 x 4.2 cm at the level 3 adenopathy is also seen over an area of 5.5 x 2.8 submandibular gland and sternocleidomastoid muscle are poorly-delineated in the adenopathy. Supraclavicular lymph node is seen measuring over 24 mm. No hypermetabolic activity in the abdomen. Mild infrarenal abdominal aortic aneurysm measuring 28 mm.   11/29/18 - Patient underwent Infusaport placement by Ashish Mcginnis M.D.   11/30/18 - WBC 8.5, hemoglobin 12.7, platelet count 358,000. Discussed workup results. Patient started on Allopurinol 300 mg p.o. daily with repeat tonsillar biopsy planned for histology. SPEP with hypoalbuminemia. Hepatitis B core antibody surface antigen, hepatis C antibody all non-reactive.   12/19/18 - Patient underwent ultrasound-guided needle biopsy of right neck mass by Ashish Funk M.D. with  pathology revealing diffuse large B-cell lymphoma germinal center type. Flow cytometry revealed CD10 positive B-cell lymphoma. The lymphoma was CD20 and surface kappa and lambda chain positive.   12/27/18 - Chemotherapy orders written for R-CHOP. Rituximab 375 mg/M2 IV day 1, Cyclophosphamide 750 mg/M2 IV day 1, Doxorubicin 50 mg/M2 IV day 1, Vincristine 1.4 mg/M2 IV day 1 with a max of 2 mg dose and Prednisone 100 mg p.o. days 3-8 to cycle every 21 days for about six cycles in a curative intent. Neulasta Onpro also ordered.    1/3/19 - Discussed results of workup and chemotherapy.   1/10/19 - Cycle 1 chemotherapy given.   1/17/19 - WBC 2.1 with 28% neutrophils, 49% lymphocytes, 8% monocytes, hemoglobin 12.9, platelet count 234,000.   1/22/19 - Patient tolerating chemotherapy well. Sed rate 49 (0-30).  ESR 49 (H).   1/31/19 - Cycle 2 R-CHOP initiated with Neulasta support. Sed rate 81 (0-30).  ESR 81 (H). Uric acid 2.4 (L). Sodium 133 (L).   2/7/19 - Labs at [facility] with verbal report of WBC 1.5 and ANC 0.41. Cipro 500 mg p.o. b.i.d. x7 days as prophylaxis prescribed.   2/12/19 - Patient tolerating chemotherapy overall well to date with some expected taste changes and constipation. Patient advised she may use MiraLAX p.r.n. and patient advised to play with diet as well use of sour candies or mints to stimulate the taste buds. A brief period of neutropenia without febrile neutropenia noted during the last cycle with Neulasta given. Allopurinol discontinued.   2/21/19 - Ferritin 121 (N), iron 18 (L),  (N), iron saturation 6% (L). Prescribed Ferrous Sulfate 325 mg daily. Received cycle 3 day 1 R-CHOP. Port will not draw. Activase utilized and started on Coumadin 1 mg p.o. daily.   2/28/19 - Stool heme negative x3. CT neck and chest with contrast showed previously-identified mass centered with right palatine tonsils appears to have resolved. Previously-identified right cervical lymphadenopathy has  resolved. No evidence of lymphadenopathy within the chest. 4 mm left upper lobe nodule is stable from prior PET CT.  This is indeterminate. Recommend attention to follow up. Echocardiogram showed aortic valve sclerosis, EF 65-70%. No pericardial effusion.   3/6/19 - Discussed with the patient the option of either continuing with chemotherapy for a total of six cycles versus stopping chemotherapy and proceeding with radiation to her site of disease. Patient would like to complete planned chemotherapy course.   3/6/19 - PT 11.8, INR 1.0.  Patient continued on Coumadin 1 mg p.o. daily for port malfunction.  ESR 64, high.    3/14/19 - Cycle 4 chemotherapy given.   4/4/19 - Cycle 5 chemotherapy given.   4/16/19 - Patient complains of upper respiratory symptoms which are improving on Cipro.   4/25/19 - Cycle 6 chemotherapy given. Urinalysis showed trace heme.   5/5/19 - . Prescribed Ciprofloxacin 500 mg p.o. b.i.d. x7 days.   5/8/19 - Echocardiogram 60-65% ejection fraction with mild mitral and tricuspid regurgitation. CT soft tissue neck with no acute process or adenopathy seen. CT chest, abdomen and pelvis showed no acute cardiopulmonary process, no suspicious lymphadenopathy. Previously described 4 mm nodule in the left upper lobe is likely an intrafissural lymph node. No acute intraabdominal or intrapelvic process. No suspicious lymphadenopathy. Atherosclerosis with infrarenal abdominal aortic ectasia/aneurysm with mural thrombus formation. Coumadin increased to 5 mg p.o. Q.PM.  8/8/2019 Coumadin dose decreased to 1 mg p.o. daily for Esjcco-q-Nvoc maintenance. Sed rate 61 (0-30).  ().  11/1/19 - CT soft tissue neck showed no evidence of recurrent or metastatic disease in the neck in this patient with a history of lymphoma. No acute findings. CT chest abdomen and pelvis showed No convincing evidence of recurrent or metastatic disease in the chest, abdomen or pelvis. Stable 3.2 cm fusiform infrarenal  abdominal aortic aneurysm with 2% luminal stenosis secondary to atherosclerotic plaquing, and irregular ulcerated plaquing. Stable 4 mm noncalcified left upper lobe pulmonary nodule since 11/20/2018. Benign etiology is favored. This may represent a noncalcified granuloma, given the extensive granulomatous changes elsewhere within the chest.  Uncomplicated cholelithiasis.  5/12/2020: CT soft tissue neck with contrast, CT chest with contrast: No evidence of lymphadenopathy within the neck or chest.  Stable small nodule in the right breast likely benign.  Recommend correlation with mammography.  8/3/2020: Bilateral 3D mammogram: BI-RADS 2 benign.  No evidence of malignancy.  Will circumscribed masses within both breasts, more numerous in the left breast, appear relatively stable from prior mammograms from 2014.   9/1/2020   11/23/2020 No convincing evidence of recurrent disease or metastatic disease in the chest, abdomen, or pelvis. A 4 mm noncalcified left upper lobe nodule is stable since the most remote available PET/CT from 11/20/2018. This confirms over 2 years of stability and benignity.  Stable 3.2 cm fusiform infrarenal abdominal aortic aneurysm with irregular and eccentric mural thrombus.  Uncomplicated cholelithiasis.   6/3/2021: WBC 10.02, hemoglobin 14.2, platelets 329  6/17/2021: CT chest abdomen pelvis with stable 4 mm noncalcified left upper lobe lung nodule unchanged from the prior examination.  Stable since PET/CT from 11/20/2018.  Confirming 2 years of stability.  No further surveillance needed.  No other sites of disease in the chest abdomen pelvis.  7/2022 - CBC CMP LDH unremarkable.  1/2023 - CBC CMP LDH unremarkable  7/2023 - increaed creatinine, rest of cbc cmp ldh unremarkable.  9/21/2023 - hb down to 11.9,wbc and plt normal.      2.   Iron deficiency anemia diagnosed February 2018.   1/31/19 - Hemoglobin 11.9, MCV 87.9 and RDW 14.9.   2/21/19 - Ferritin 121 (N), iron 18 (L),  (N),  iron saturation 6 (L). Prescribed Ferrous Sulfate 325 mg one tablet p.o. daily.   2/28/19 - Stool heme negative x3.   3/26/19 - Patient reports she is not taking ferrous sulfate daily as prescribed.  She chose to increase her intake of red meat.  Instructed to start taking ferrous sulfate 325 mg p.o. daily.  Explained rationale.   5/14/19 - 160 (). Iron 17 (). TIBC 283 (228-428). Iron saturation 6 (15-50).  Ferritin 160.  8/8/2019 hemoglobin 13.2, MCV 82. Ferritin 72 ().   5/21/2020: Ferritin 107.3, iron saturation 17 low, serum iron 57, TIBC 332, , creatinine 0.55, LFTs normal, WBC 8.9, hemoglobin 14.6, platelets 341, MCV 90.6  12/3/2020 WBC 10.1, Hgb 14.5, Plts 336    3.   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   5/9/19 - Prescribed Coumadin 5 mg p.o. daily. Referred to Dr. Duckworth for evaluation of infrarenal aortic aneurysm and mural thrombus seen on CT abdomen and pelvis.   5/14/19 - INR 1.4. Increased Coumadin to 6 mg p.o. daily. Follow INR protocol. INR’s to be done at [facility]. Prescription given.  8/8/2019 patient claims to have been seen by Dr. Duckworth and advised to stop warfarin.      SUBJECTIVE:   Patient's weight loss has stablized. No fatigue, no night sweats       Past Medical History:   Diagnosis Date    Hypertension 1998     No past surgical history on file.      Current Outpatient Medications:     amLODIPine (NORVASC) 2.5 MG tablet, , Disp: , Rfl:     B Complex Vitamins (VITAMIN B COMPLEX 100 IJ), Inject  as directed., Disp: , Rfl:     Calcium-Magnesium-Vitamin D (CALCIUM 500 PO), Take  by mouth Daily., Disp: , Rfl:     Cholecalciferol (VITAMIN D) 2000 units capsule, Take  by mouth Daily., Disp: , Rfl:     Potassium 99 MG tablet, Take  by mouth Daily., Disp: , Rfl:     No Known Allergies    Family History   Problem Relation Age of Onset    Kidney cancer Father 66    Leukemia Brother 48    Multiple myeloma Brother 47      Cancer-related family history includes Kidney cancer (age of onset: 66) in her father.    Social History     Tobacco Use    Smoking status: Every Day     Packs/day: 0.50     Years: 10.00     Pack years: 5.00     Types: Cigarettes    Smokeless tobacco: Never    Tobacco comments:     started smoking when she was in her 20’s   Substance Use Topics    Alcohol use: No    Drug use: No     REVIEW OF SYSTEMS:  12 point system negative except above.    OBJECTIVE:    There were no vitals filed for this visit.    ECOG  (0) Fully active, able to carry on all predisease performance without restriction    Physical Exam   HENT:   Head: Normocephalic.   Mouth/Throat: Mucous membranes are moist.   Eyes: Pupils are equal, round, and reactive to light.   Cardiovascular: Normal rate.   Pulmonary/Chest: Effort normal and breath sounds normal.   Abdominal: Normal appearance.   Musculoskeletal: Normal range of motion.   Lymphadenopathy:     She has no cervical adenopathy.   Neurological: She is alert.   Skin: Skin is warm.      RECENT LABS  Lab Results - Last 18 Months   Lab Units 07/31/23  1444 12/14/22  1426 06/29/22  1405   WBC 10*3/mm3 10.28 9.86 10.98*   HEMOGLOBIN g/dL 13.3 13.7 14.4   HEMATOCRIT % 41.3 41.3 44.2   PLATELETS 10*3/mm3 335 281 345   MCV fL 87.5 91.2 90.6       Lab Results - Last 18 Months   Lab Units 08/03/23  1326 07/31/23  1444 12/14/22  1426 06/29/22  1405   SODIUM mmol/L 137 137 138 140   POTASSIUM mmol/L 3.9 4.1 3.7 3.8   CHLORIDE mmol/L 98 101 101 103   CO2 mmol/L 28.0 27.0 27.0 25.0   BUN mg/dL 11 10 8 7*   CREATININE mg/dL 0.78 1.35* 0.60 0.57   CALCIUM mg/dL 9.7 9.1 8.8 8.7   BILIRUBIN mg/dL  --  0.4 0.3 0.3   ALK PHOS U/L  --  110 110 112   ALT (SGPT) U/L  --  11 10 11   AST (SGOT) U/L  --  14 14 17   GLUCOSE mg/dL 70 80 122* 92       Lab Results   Component Value Date    GLUCOSE 70 08/03/2023    BUN 11 08/03/2023    CREATININE 0.78 08/03/2023    EGFRIFNONA 83 12/09/2021    BCR 14.1 08/03/2023    K 3.9  08/03/2023    CO2 28.0 08/03/2023    CALCIUM 9.7 08/03/2023    ALBUMIN 3.7 07/31/2023    LABIL2 0.8 (L) 04/25/2019    AST 14 07/31/2023    ALT 11 07/31/2023     Lab Results   Component Value Date    IRON 57 06/03/2021    TIBC 352 06/03/2021    FERRITIN 128.70 06/03/2021     No results found for: FOLATE  No results found for: OCCULTBLD  No results found for: RETICCTPCT  No results found for: WXBICGCI14, TSH  No results found for: SPEP, UPEP  LDH   Date Value Ref Range Status   07/31/2023 172 135 - 214 U/L Final     Comment:     Specimen hemolyzed.  Results may be affected.     Uric Acid   Date Value Ref Range Status   01/31/2019 2.4 (L) 2.6 - 8.0 mg/dL Final     Lab Results   Component Value Date    SEDRATE 61 (H) 08/08/2019     No results found for: FIBRINOGEN, HAPTOGLOBIN, DDIMER  No results found for: DDIMER  Lab Results   Component Value Date    INR 2.30 08/06/2019     No results found for:   No results found for: CEA  No results found for:   No results found for: PSA  No results found for: [id number]    Assessment & Plan     There are no diagnoses linked to this encounter.    ASSESSMENT:    Diffuse large B-cell lymphoma of lymph nodes of neck (CMS/HCC): Status post 6 cycles of R-CHOP  finished 4/25/2019: Restaging CT scan 5/12/2020 does not show any evidence of lymphadenopathy in the neck chest.  Restaging CT scan on 11/23/2020 negative for recurrence.  Now another CT done in June 2021 with no evidence of disease recurrence. Currently patient having constitutional symptoms, weight loss, will plan on CT imaging, if normal repeat labs in 3 months.   Encounter for care related to vascular access port.  Continue port flush. Consider removal if no recurrence.  Elevated creatinine - improved  Iron deficiency anemia due to chronic blood loss:  Intermittently taking oral iron.  Tobacco use is slowing down now.  Aortic mural thrombus - was on coumadin initially this was stopped by vascular surgery.   Right breast  nodule seen on previous CT scan.  No palpable abnormality.  Mammogram normal on 8/3/2020.  Normal mention of the recent CT.  Continue screening mammogram. She has been non compliant will get one scheduled.      PLAN:    CT imaging  Continue screening mammogram with primary care physician  3 month follo wup with cbc cmp ldh iron studies if scans normal

## 2023-09-21 ENCOUNTER — HOSPITAL ENCOUNTER (OUTPATIENT)
Dept: ONCOLOGY | Facility: HOSPITAL | Age: 68
Discharge: HOME | End: 2023-09-21
Admitting: INTERNAL MEDICINE
Payer: MEDICARE

## 2023-09-21 ENCOUNTER — OFFICE VISIT (OUTPATIENT)
Dept: ONCOLOGY | Facility: CLINIC | Age: 68
End: 2023-09-21
Payer: MEDICARE

## 2023-09-21 VITALS
TEMPERATURE: 98.4 F | HEIGHT: 65 IN | WEIGHT: 152.4 LBS | SYSTOLIC BLOOD PRESSURE: 154 MMHG | DIASTOLIC BLOOD PRESSURE: 92 MMHG | OXYGEN SATURATION: 98 % | RESPIRATION RATE: 18 BRPM | HEART RATE: 102 BPM | BODY MASS INDEX: 25.39 KG/M2

## 2023-09-21 DIAGNOSIS — D50.0 IRON DEFICIENCY ANEMIA DUE TO CHRONIC BLOOD LOSS: Primary | ICD-10-CM

## 2023-09-21 DIAGNOSIS — Z45.2 ENCOUNTER FOR CARE RELATED TO VASCULAR ACCESS PORT: Primary | ICD-10-CM

## 2023-09-21 DIAGNOSIS — C83.31 DIFFUSE LARGE B-CELL LYMPHOMA OF LYMPH NODES OF NECK: ICD-10-CM

## 2023-09-21 LAB
BASOPHILS # BLD AUTO: 0.04 10*3/MM3 (ref 0–0.2)
BASOPHILS NFR BLD AUTO: 0.4 % (ref 0–1.5)
DEPRECATED RDW RBC AUTO: 46.7 FL (ref 37–54)
EOSINOPHIL # BLD AUTO: 0.22 10*3/MM3 (ref 0–0.4)
EOSINOPHIL NFR BLD AUTO: 2.2 % (ref 0.3–6.2)
ERYTHROCYTE [DISTWIDTH] IN BLOOD BY AUTOMATED COUNT: 14.9 % (ref 12.3–15.4)
HCT VFR BLD AUTO: 37.6 % (ref 34–46.6)
HGB BLD-MCNC: 11.9 G/DL (ref 12–15.9)
LYMPHOCYTES # BLD AUTO: 1.41 10*3/MM3 (ref 0.7–3.1)
LYMPHOCYTES NFR BLD AUTO: 14.1 % (ref 19.6–45.3)
MCH RBC QN AUTO: 27.4 PG (ref 26.6–33)
MCHC RBC AUTO-ENTMCNC: 31.6 G/DL (ref 31.5–35.7)
MCV RBC AUTO: 86.6 FL (ref 79–97)
MONOCYTES # BLD AUTO: 0.68 10*3/MM3 (ref 0.1–0.9)
MONOCYTES NFR BLD AUTO: 6.8 % (ref 5–12)
NEUTROPHILS NFR BLD AUTO: 7.63 10*3/MM3 (ref 1.7–7)
NEUTROPHILS NFR BLD AUTO: 76.5 % (ref 42.7–76)
PLATELET # BLD AUTO: 333 10*3/MM3 (ref 140–450)
PMV BLD AUTO: 10.2 FL (ref 6–12)
RBC # BLD AUTO: 4.34 10*6/MM3 (ref 3.77–5.28)
WBC NRBC COR # BLD: 9.98 10*3/MM3 (ref 3.4–10.8)

## 2023-09-21 PROCEDURE — 85025 COMPLETE CBC W/AUTO DIFF WBC: CPT | Performed by: INTERNAL MEDICINE

## 2023-09-21 PROCEDURE — 25010000002 HEPARIN LOCK FLUSH PER 10 UNITS: Performed by: INTERNAL MEDICINE

## 2023-09-21 PROCEDURE — 36591 DRAW BLOOD OFF VENOUS DEVICE: CPT

## 2023-09-21 PROCEDURE — 1126F AMNT PAIN NOTED NONE PRSNT: CPT | Performed by: INTERNAL MEDICINE

## 2023-09-21 PROCEDURE — 99214 OFFICE O/P EST MOD 30 MIN: CPT | Performed by: INTERNAL MEDICINE

## 2023-09-21 RX ORDER — SODIUM CHLORIDE 0.9 % (FLUSH) 0.9 %
20 SYRINGE (ML) INJECTION AS NEEDED
OUTPATIENT
Start: 2023-09-21

## 2023-09-21 RX ORDER — HEPARIN SODIUM (PORCINE) LOCK FLUSH IV SOLN 100 UNIT/ML 100 UNIT/ML
500 SOLUTION INTRAVENOUS AS NEEDED
OUTPATIENT
Start: 2023-09-21

## 2023-09-21 RX ORDER — HEPARIN SODIUM (PORCINE) LOCK FLUSH IV SOLN 100 UNIT/ML 100 UNIT/ML
500 SOLUTION INTRAVENOUS AS NEEDED
Status: DISCONTINUED | OUTPATIENT
Start: 2023-09-21 | End: 2023-09-22 | Stop reason: HOSPADM

## 2023-09-21 RX ORDER — SODIUM CHLORIDE 0.9 % (FLUSH) 0.9 %
20 SYRINGE (ML) INJECTION AS NEEDED
Status: DISCONTINUED | OUTPATIENT
Start: 2023-09-21 | End: 2023-09-22 | Stop reason: HOSPADM

## 2023-09-21 RX ADMIN — Medication 20 ML: at 14:02

## 2023-09-21 RX ADMIN — HEPARIN 500 UNITS: 100 SYRINGE at 14:04

## 2023-09-21 NOTE — PROGRESS NOTES
Patient in for port flush and blood draw.  10 cc blood wasted prior to specimen collection.  Specimens collected and sent to lab for processing.

## 2023-09-28 ENCOUNTER — HOSPITAL ENCOUNTER (OUTPATIENT)
Dept: PET IMAGING | Facility: HOSPITAL | Age: 68
Discharge: HOME | End: 2023-09-28
Admitting: INTERNAL MEDICINE
Payer: MEDICARE

## 2023-09-28 DIAGNOSIS — C83.31 DIFFUSE LARGE B-CELL LYMPHOMA OF LYMPH NODES OF NECK: ICD-10-CM

## 2023-09-28 LAB
CREAT BLDA-MCNC: 0.7 MG/DL (ref 0.6–1.3)
EGFRCR SERPLBLD CKD-EPI 2021: 95.5 ML/MIN/1.73

## 2023-09-28 PROCEDURE — 74177 CT ABD & PELVIS W/CONTRAST: CPT

## 2023-09-28 PROCEDURE — 25510000001 IOPAMIDOL PER 1 ML: Performed by: INTERNAL MEDICINE

## 2023-09-28 PROCEDURE — 71260 CT THORAX DX C+: CPT

## 2023-09-28 PROCEDURE — 82565 ASSAY OF CREATININE: CPT

## 2023-09-28 RX ADMIN — IOPAMIDOL 100 ML: 755 INJECTION, SOLUTION INTRAVENOUS at 10:48

## 2023-10-26 ENCOUNTER — HOSPITAL ENCOUNTER (OUTPATIENT)
Dept: ONCOLOGY | Facility: HOSPITAL | Age: 68
Discharge: HOME | End: 2023-10-26
Admitting: INTERNAL MEDICINE
Payer: MEDICARE

## 2023-10-26 DIAGNOSIS — Z45.2 ENCOUNTER FOR CARE RELATED TO VASCULAR ACCESS PORT: Primary | ICD-10-CM

## 2023-10-26 DIAGNOSIS — C83.31 DIFFUSE LARGE B-CELL LYMPHOMA OF LYMPH NODES OF NECK: ICD-10-CM

## 2023-10-26 LAB
FERRITIN SERPL-MCNC: 170.1 NG/ML (ref 13–150)
FOLATE SERPL-MCNC: 17.2 NG/ML (ref 4.78–24.2)
IRON 24H UR-MRATE: 37 MCG/DL (ref 37–145)
IRON SATN MFR SERPL: 10 % (ref 20–50)
TIBC SERPL-MCNC: 383 MCG/DL (ref 298–536)
TRANSFERRIN SERPL-MCNC: 257 MG/DL (ref 200–360)
VIT B12 BLD-MCNC: 952 PG/ML (ref 211–946)

## 2023-10-26 PROCEDURE — 82728 ASSAY OF FERRITIN: CPT | Performed by: INTERNAL MEDICINE

## 2023-10-26 PROCEDURE — 25010000002 HEPARIN LOCK FLUSH PER 10 UNITS: Performed by: INTERNAL MEDICINE

## 2023-10-26 PROCEDURE — 82746 ASSAY OF FOLIC ACID SERUM: CPT | Performed by: INTERNAL MEDICINE

## 2023-10-26 PROCEDURE — 82607 VITAMIN B-12: CPT | Performed by: INTERNAL MEDICINE

## 2023-10-26 PROCEDURE — 84466 ASSAY OF TRANSFERRIN: CPT | Performed by: INTERNAL MEDICINE

## 2023-10-26 PROCEDURE — 83540 ASSAY OF IRON: CPT | Performed by: INTERNAL MEDICINE

## 2023-10-26 PROCEDURE — 36591 DRAW BLOOD OFF VENOUS DEVICE: CPT

## 2023-10-26 RX ORDER — SODIUM CHLORIDE 0.9 % (FLUSH) 0.9 %
20 SYRINGE (ML) INJECTION AS NEEDED
Status: DISCONTINUED | OUTPATIENT
Start: 2023-10-26 | End: 2023-10-27 | Stop reason: HOSPADM

## 2023-10-26 RX ORDER — HEPARIN SODIUM (PORCINE) LOCK FLUSH IV SOLN 100 UNIT/ML 100 UNIT/ML
500 SOLUTION INTRAVENOUS AS NEEDED
Status: DISCONTINUED | OUTPATIENT
Start: 2023-10-26 | End: 2023-10-27 | Stop reason: HOSPADM

## 2023-10-26 RX ADMIN — Medication 20 ML: at 14:23

## 2023-10-26 RX ADMIN — HEPARIN 500 UNITS: 100 SYRINGE at 14:25

## 2023-11-30 ENCOUNTER — HOSPITAL ENCOUNTER (OUTPATIENT)
Dept: ONCOLOGY | Facility: HOSPITAL | Age: 68
Discharge: HOME | End: 2023-11-30
Admitting: INTERNAL MEDICINE
Payer: MEDICARE

## 2023-11-30 DIAGNOSIS — C83.31 DIFFUSE LARGE B-CELL LYMPHOMA OF LYMPH NODES OF NECK: ICD-10-CM

## 2023-11-30 DIAGNOSIS — Z45.2 ENCOUNTER FOR CARE RELATED TO VASCULAR ACCESS PORT: Primary | ICD-10-CM

## 2023-11-30 DIAGNOSIS — D50.0 IRON DEFICIENCY ANEMIA DUE TO CHRONIC BLOOD LOSS: ICD-10-CM

## 2023-11-30 LAB
ALBUMIN SERPL-MCNC: 3.7 G/DL (ref 3.5–5.2)
ALBUMIN/GLOB SERPL: 1.2 G/DL
ALP SERPL-CCNC: 111 U/L (ref 39–117)
ALT SERPL W P-5'-P-CCNC: 20 U/L (ref 1–33)
ANION GAP SERPL CALCULATED.3IONS-SCNC: 11 MMOL/L (ref 5–15)
AST SERPL-CCNC: 24 U/L (ref 1–32)
BASOPHILS # BLD AUTO: 0.02 10*3/MM3 (ref 0–0.2)
BASOPHILS NFR BLD AUTO: 0.2 % (ref 0–1.5)
BILIRUB SERPL-MCNC: 0.2 MG/DL (ref 0–1.2)
BUN SERPL-MCNC: 15 MG/DL (ref 8–23)
BUN/CREAT SERPL: 24.2 (ref 7–25)
CALCIUM SPEC-SCNC: 9.4 MG/DL (ref 8.6–10.5)
CHLORIDE SERPL-SCNC: 100 MMOL/L (ref 98–107)
CO2 SERPL-SCNC: 23 MMOL/L (ref 22–29)
CREAT SERPL-MCNC: 0.62 MG/DL (ref 0.57–1)
DEPRECATED RDW RBC AUTO: 50.1 FL (ref 37–54)
EGFRCR SERPLBLD CKD-EPI 2021: 98.4 ML/MIN/1.73
EOSINOPHIL # BLD AUTO: 0.41 10*3/MM3 (ref 0–0.4)
EOSINOPHIL NFR BLD AUTO: 4.5 % (ref 0.3–6.2)
ERYTHROCYTE [DISTWIDTH] IN BLOOD BY AUTOMATED COUNT: 16.3 % (ref 12.3–15.4)
FERRITIN SERPL-MCNC: 179.9 NG/ML (ref 13–150)
GLOBULIN UR ELPH-MCNC: 3.2 GM/DL
GLUCOSE SERPL-MCNC: 101 MG/DL (ref 65–99)
HCT VFR BLD AUTO: 42.7 % (ref 34–46.6)
HGB BLD-MCNC: 13.5 G/DL (ref 12–15.9)
IRON 24H UR-MRATE: 54 MCG/DL (ref 37–145)
IRON SATN MFR SERPL: 16 % (ref 20–50)
LDH SERPL-CCNC: 224 U/L (ref 135–214)
LYMPHOCYTES # BLD AUTO: 1.9 10*3/MM3 (ref 0.7–3.1)
LYMPHOCYTES NFR BLD AUTO: 20.7 % (ref 19.6–45.3)
MCH RBC QN AUTO: 27.4 PG (ref 26.6–33)
MCHC RBC AUTO-ENTMCNC: 31.6 G/DL (ref 31.5–35.7)
MCV RBC AUTO: 86.6 FL (ref 79–97)
MONOCYTES # BLD AUTO: 0.68 10*3/MM3 (ref 0.1–0.9)
MONOCYTES NFR BLD AUTO: 7.4 % (ref 5–12)
NEUTROPHILS NFR BLD AUTO: 6.18 10*3/MM3 (ref 1.7–7)
NEUTROPHILS NFR BLD AUTO: 67.2 % (ref 42.7–76)
PLATELET # BLD AUTO: 371 10*3/MM3 (ref 140–450)
PMV BLD AUTO: 9.8 FL (ref 6–12)
POTASSIUM SERPL-SCNC: 4.6 MMOL/L (ref 3.5–5.2)
PROT SERPL-MCNC: 6.9 G/DL (ref 6–8.5)
RBC # BLD AUTO: 4.93 10*6/MM3 (ref 3.77–5.28)
SODIUM SERPL-SCNC: 134 MMOL/L (ref 136–145)
TIBC SERPL-MCNC: 331 MCG/DL (ref 298–536)
TRANSFERRIN SERPL-MCNC: 222 MG/DL (ref 200–360)
VIT B12 BLD-MCNC: 580 PG/ML (ref 211–946)
WBC NRBC COR # BLD AUTO: 9.19 10*3/MM3 (ref 3.4–10.8)

## 2023-11-30 PROCEDURE — 83615 LACTATE (LD) (LDH) ENZYME: CPT | Performed by: INTERNAL MEDICINE

## 2023-11-30 PROCEDURE — 85025 COMPLETE CBC W/AUTO DIFF WBC: CPT | Performed by: INTERNAL MEDICINE

## 2023-11-30 PROCEDURE — 82728 ASSAY OF FERRITIN: CPT | Performed by: INTERNAL MEDICINE

## 2023-11-30 PROCEDURE — 25010000002 HEPARIN LOCK FLUSH PER 10 UNITS: Performed by: INTERNAL MEDICINE

## 2023-11-30 PROCEDURE — 80053 COMPREHEN METABOLIC PANEL: CPT | Performed by: INTERNAL MEDICINE

## 2023-11-30 PROCEDURE — 36591 DRAW BLOOD OFF VENOUS DEVICE: CPT

## 2023-11-30 PROCEDURE — 82607 VITAMIN B-12: CPT | Performed by: INTERNAL MEDICINE

## 2023-11-30 PROCEDURE — 83540 ASSAY OF IRON: CPT | Performed by: INTERNAL MEDICINE

## 2023-11-30 PROCEDURE — 84466 ASSAY OF TRANSFERRIN: CPT | Performed by: INTERNAL MEDICINE

## 2023-11-30 RX ORDER — HEPARIN SODIUM (PORCINE) LOCK FLUSH IV SOLN 100 UNIT/ML 100 UNIT/ML
500 SOLUTION INTRAVENOUS AS NEEDED
Status: DISCONTINUED | OUTPATIENT
Start: 2023-11-30 | End: 2023-12-01 | Stop reason: HOSPADM

## 2023-11-30 RX ORDER — SODIUM CHLORIDE 0.9 % (FLUSH) 0.9 %
20 SYRINGE (ML) INJECTION AS NEEDED
Status: DISCONTINUED | OUTPATIENT
Start: 2023-11-30 | End: 2023-12-01 | Stop reason: HOSPADM

## 2023-11-30 RX ADMIN — HEPARIN 500 UNITS: 100 SYRINGE at 14:08

## 2023-11-30 RX ADMIN — Medication 20 ML: at 14:06

## 2023-12-07 ENCOUNTER — OFFICE VISIT (OUTPATIENT)
Dept: ONCOLOGY | Facility: CLINIC | Age: 68
End: 2023-12-07
Payer: MEDICARE

## 2023-12-07 VITALS
WEIGHT: 151.4 LBS | OXYGEN SATURATION: 96 % | HEART RATE: 78 BPM | BODY MASS INDEX: 25.22 KG/M2 | HEIGHT: 65 IN | RESPIRATION RATE: 16 BRPM

## 2023-12-07 DIAGNOSIS — C83.31 DIFFUSE LARGE B-CELL LYMPHOMA OF LYMPH NODES OF NECK: Primary | ICD-10-CM

## 2023-12-07 DIAGNOSIS — D50.0 IRON DEFICIENCY ANEMIA DUE TO CHRONIC BLOOD LOSS: ICD-10-CM

## 2023-12-07 RX ORDER — UBIDECARENONE 75 MG
1 CAPSULE ORAL DAILY
COMMUNITY

## 2023-12-07 RX ORDER — ASCORBIC ACID 250 MG
250 TABLET ORAL DAILY
COMMUNITY

## 2023-12-07 RX ORDER — CARVEDILOL 3.12 MG/1
3.12 TABLET ORAL
COMMUNITY
Start: 2023-11-06 | End: 2024-02-04

## 2023-12-07 RX ORDER — ASPIRIN 81 MG/1
TABLET ORAL
COMMUNITY
Start: 2023-10-20

## 2023-12-07 RX ORDER — SACUBITRIL AND VALSARTAN 24; 26 MG/1; MG/1
1 TABLET, FILM COATED ORAL 2 TIMES DAILY
COMMUNITY
Start: 2023-10-20 | End: 2024-11-19

## 2023-12-07 RX ORDER — ROSUVASTATIN CALCIUM 10 MG/1
TABLET, COATED ORAL
COMMUNITY
Start: 2023-10-20

## 2023-12-07 RX ORDER — BUPROPION HYDROCHLORIDE 150 MG/1
150 TABLET, EXTENDED RELEASE ORAL
COMMUNITY
Start: 2023-10-21

## 2023-12-07 NOTE — PROGRESS NOTES
Hematology/Oncology Outpatient Follow Up    PATIENT NAME:Sarah Borja  :1955  MRN: [id number]  PRIMARY CARE PHYSICIAN: Miguel Lezama MD  REFERRING PHYSICIAN: Miguel Lezama, *    Chief Complaint   Patient presents with    Follow-up     Diffuse large B-cell lymphoma of lymph nodes of neck          HISTORY OF PRESENT ILLNESS:   Right tonsillar diffuse large B-cell lymphoma diagnosed 2018.  This  female who claims to have developed a sore throat in 2018. She saw her primary care provider, Tran Vaz N.P. and was prescribed antibiotic. She did have palpable lymph nodes and a CT scan of the soft tissue neck was performed on 10/11/18 revealing asymmetric enlargement of the right palatine tonsil. There was right jugular digastric and posterior triangle adenopathy. The largest lymph node within the posterior triangle measured approximately 3.6 x 3.2 x 2.0 cm. She was referred to Sly Rosario M.D. and was seen by him in initial consultation on 18 where laryngoscopy revealed right tonsillar mass. FNA was performed on in on 10/31/18 with specimen sent for pathology, but routine staining was not performed. Flow cytometry revealed a CD10 positive monoclonal B-cell population which by light scatter analysis fell into both the small cell and large cell regions, though predominantly into the large cell region. FISH analysis for BCL2, BCL6 and MYC were negative. Though Dr. Rosario had requested routine staining of the specimen, this was not performed and Pathology had wanted a more fresh specimen, prior to which she was referred to me for consultation. The patient today is denying any weight loss. She does complain of low-grade fevers controlled with Aspirin and claims to have had night sweats on and off for 10 years.   18 - [facility] cell non-Hodgkin's lymphoma of the right tonsil on referral by Sly  REECE Rosario WBC 10.9 with 69% neutrophils, 23% lymphocytes, 6% monocytes, hemoglobin 13.8, platelet count 408,000.  ().  Globulin 4.1 (2.5-3.8), uric acid 5.0 (2.6-8.0), ESR 61 (0-30).      11/16/18 - Bone marrow aspiration and biopsy with normocellular marrow with maturing trilineage hematopoiesis. Immunohistochemistry had no increase in CD34 positive blasts or CD20 positive B-lymphocytes. Flow cytometry had no evidence of an abnormal cell type. Cytogenetics revealed 48 XX normal female karyotype. Echocardiogram with mild mitral and mild tricuspid regurgitation, moderate concentric left ventricular hypertrophy and LV systolic function normal with EF about 65-70%.   11/17/18 - Echocardiogram with mild mitral and tricuspid regurgitation, moderate concentric left ventricular hypertrophy, LV systolic function normal with EF about 65-70%.   11/19/18 - Chest x-ray with clear lungs.   11/20/18 - PET scan with extensive right neck lymphadenopathy beginning at the right tonsillar pillar and continuing to the supraclavicular region with SUV between 26 and 16 with extremely hypermetabolic lymph nodes, changes of cholelithiasis and old healed granulomatous disease. Mass of the nodes at L2 are approximately 6.5 x 4.2 cm at the level 3 adenopathy is also seen over an area of 5.5 x 2.8 submandibular gland and sternocleidomastoid muscle are poorly-delineated in the adenopathy. Supraclavicular lymph node is seen measuring over 24 mm. No hypermetabolic activity in the abdomen. Mild infrarenal abdominal aortic aneurysm measuring 28 mm.   11/29/18 - Patient underwent Infusaport placement by Ashish Mcginnis M.D.   11/30/18 - WBC 8.5, hemoglobin 12.7, platelet count 358,000. Discussed workup results. Patient started on Allopurinol 300 mg p.o. daily with repeat tonsillar biopsy planned for histology. SPEP with hypoalbuminemia. Hepatitis B core antibody surface antigen, hepatis C antibody all non-reactive.   12/19/18 -  Patient underwent ultrasound-guided needle biopsy of right neck mass by Ashish Funk M.D. with pathology revealing diffuse large B-cell lymphoma germinal center type. Flow cytometry revealed CD10 positive B-cell lymphoma. The lymphoma was CD20 and surface kappa and lambda chain positive.   12/27/18 - Chemotherapy orders written for R-CHOP. Rituximab 375 mg/M2 IV day 1, Cyclophosphamide 750 mg/M2 IV day 1, Doxorubicin 50 mg/M2 IV day 1, Vincristine 1.4 mg/M2 IV day 1 with a max of 2 mg dose and Prednisone 100 mg p.o. days 3-8 to cycle every 21 days for about six cycles in a curative intent. Neulasta Onpro also ordered.    1/3/19 - Discussed results of workup and chemotherapy.   1/10/19 - Cycle 1 chemotherapy given.   1/17/19 - WBC 2.1 with 28% neutrophils, 49% lymphocytes, 8% monocytes, hemoglobin 12.9, platelet count 234,000.   1/22/19 - Patient tolerating chemotherapy well. Sed rate 49 (0-30).  ESR 49 (H).   1/31/19 - Cycle 2 R-CHOP initiated with Neulasta support. Sed rate 81 (0-30).  ESR 81 (H). Uric acid 2.4 (L). Sodium 133 (L).   2/7/19 - Labs at [facility] with verbal report of WBC 1.5 and ANC 0.41. Cipro 500 mg p.o. b.i.d. x7 days as prophylaxis prescribed.   2/12/19 - Patient tolerating chemotherapy overall well to date with some expected taste changes and constipation. Patient advised she may use MiraLAX p.r.n. and patient advised to play with diet as well use of sour candies or mints to stimulate the taste buds. A brief period of neutropenia without febrile neutropenia noted during the last cycle with Neulasta given. Allopurinol discontinued.   2/21/19 - Ferritin 121 (N), iron 18 (L),  (N), iron saturation 6% (L). Prescribed Ferrous Sulfate 325 mg daily. Received cycle 3 day 1 R-CHOP. Port will not draw. Activase utilized and started on Coumadin 1 mg p.o. daily.   2/28/19 - Stool heme negative x3. CT neck and chest with contrast showed previously-identified mass centered with right  palatine tonsils appears to have resolved. Previously-identified right cervical lymphadenopathy has resolved. No evidence of lymphadenopathy within the chest. 4 mm left upper lobe nodule is stable from prior PET CT.  This is indeterminate. Recommend attention to follow up. Echocardiogram showed aortic valve sclerosis, EF 65-70%. No pericardial effusion.   3/6/19 - Discussed with the patient the option of either continuing with chemotherapy for a total of six cycles versus stopping chemotherapy and proceeding with radiation to her site of disease. Patient would like to complete planned chemotherapy course.   3/6/19 - PT 11.8, INR 1.0.  Patient continued on Coumadin 1 mg p.o. daily for port malfunction.  ESR 64, high.    3/14/19 - Cycle 4 chemotherapy given.   4/4/19 - Cycle 5 chemotherapy given.   4/16/19 - Patient complains of upper respiratory symptoms which are improving on Cipro.   4/25/19 - Cycle 6 chemotherapy given. Urinalysis showed trace heme.   5/5/19 - . Prescribed Ciprofloxacin 500 mg p.o. b.i.d. x7 days.   5/8/19 - Echocardiogram 60-65% ejection fraction with mild mitral and tricuspid regurgitation. CT soft tissue neck with no acute process or adenopathy seen. CT chest, abdomen and pelvis showed no acute cardiopulmonary process, no suspicious lymphadenopathy. Previously described 4 mm nodule in the left upper lobe is likely an intrafissural lymph node. No acute intraabdominal or intrapelvic process. No suspicious lymphadenopathy. Atherosclerosis with infrarenal abdominal aortic ectasia/aneurysm with mural thrombus formation. Coumadin increased to 5 mg p.o. Q.PM.  8/8/2019 Coumadin dose decreased to 1 mg p.o. daily for [id number] maintenance. Sed rate 61 (0-30).  ().  11/1/19 - CT soft tissue neck showed no evidence of recurrent or metastatic disease in the neck in this patient with a history of lymphoma. No acute findings. CT chest abdomen and pelvis showed No convincing evidence  of recurrent or metastatic disease in the chest, abdomen or pelvis. Stable 3.2 cm fusiform infrarenal abdominal aortic aneurysm with 2% luminal stenosis secondary to atherosclerotic plaquing, and irregular ulcerated plaquing. Stable 4 mm noncalcified left upper lobe pulmonary nodule since 11/20/2018. Benign etiology is favored. This may represent a noncalcified granuloma, given the extensive granulomatous changes elsewhere within the chest.  Uncomplicated cholelithiasis.  5/12/2020: CT soft tissue neck with contrast, CT chest with contrast: No evidence of lymphadenopathy within the neck or chest.  Stable small nodule in the right breast likely benign.  Recommend correlation with mammography.  8/3/2020: Bilateral 3D mammogram: BI-RADS 2 benign.  No evidence of malignancy.  Will circumscribed masses within both breasts, more numerous in the left breast, appear relatively stable from prior mammograms from 2014.   9/1/2020   11/23/2020 No convincing evidence of recurrent disease or metastatic disease in the chest, abdomen, or pelvis. A 4 mm noncalcified left upper lobe nodule is stable since the most remote available PET/CT from 11/20/2018. This confirms over 2 years of stability and benignity.  Stable 3.2 cm fusiform infrarenal abdominal aortic aneurysm with irregular and eccentric mural thrombus.  Uncomplicated cholelithiasis.   6/3/2021: WBC 10.02, hemoglobin 14.2, platelets 329  6/17/2021: CT chest abdomen pelvis with stable 4 mm noncalcified left upper lobe lung nodule unchanged from the prior examination.  Stable since PET/CT from 11/20/2018.  Confirming 2 years of stability.  No further surveillance needed.  No other sites of disease in the chest abdomen pelvis.  7/2022 - CBC CMP LDH unremarkable.  1/2023 - CBC CMP LDH unremarkable  7/2023 - increaed creatinine, rest of cbc cmp ldh unremarkable.  9/21/2023 - hb down to 11.9,wbc and plt normal.  11/30/23 - Hb 13.5, WBC 9.19, plt 371,      2.   Iron  deficiency anemia diagnosed February 2018.   1/31/19 - Hemoglobin 11.9, MCV 87.9 and RDW 14.9.   2/21/19 - Ferritin 121 (N), iron 18 (L),  (N), iron saturation 6 (L). Prescribed Ferrous Sulfate 325 mg one tablet p.o. daily.   2/28/19 - Stool heme negative x3.   3/26/19 - Patient reports she is not taking ferrous sulfate daily as prescribed.  She chose to increase her intake of red meat.  Instructed to start taking ferrous sulfate 325 mg p.o. daily.  Explained rationale.   5/14/19 - 160 (). Iron 17 (). TIBC 283 (228-428). Iron saturation 6 (15-50).  Ferritin 160.  8/8/2019 hemoglobin 13.2, MCV 82. Ferritin 72 ().   5/21/2020: Ferritin 107.3, iron saturation 17 low, serum iron 57, TIBC 332, , creatinine 0.55, LFTs normal, WBC 8.9, hemoglobin 14.6, platelets 341, MCV 90.6  12/3/2020 WBC 10.1, Hgb 14.5, Plts 336  11/30/23 - ferritin 179, iron sat 16    3.   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   5/9/19 - Prescribed Coumadin 5 mg p.o. daily. Referred to Dr. Duckworth for evaluation of infrarenal aortic aneurysm and mural thrombus seen on CT abdomen and pelvis.   5/14/19 - INR 1.4. Increased Coumadin to 6 mg p.o. daily. Follow INR protocol. INR’s to be done at [facility]. Prescription given.  8/8/2019 patient claims to have been seen by Dr. Duckworth and advised to stop warfarin.      SUBJECTIVE:   No new constitutional symptoms.      Past Medical History:   Diagnosis Date    Hypertension 1998     No past surgical history on file.      Current Outpatient Medications:     amLODIPine (NORVASC) 2.5 MG tablet, , Disp: , Rfl:     ascorbic acid (VITAMIN C) 250 MG tablet, Take 1 tablet by mouth Daily., Disp: , Rfl:     aspirin 81 MG EC tablet, Every other day, Disp: , Rfl:     B Complex Vitamins (VITAMIN B COMPLEX 100 IJ), Inject  as directed., Disp: , Rfl:     buPROPion SR (WELLBUTRIN SR) 150 MG 12 hr tablet, Take 1 tablet by mouth., Disp: , Rfl:      "Calcium-Magnesium-Vitamin D (CALCIUM 500 PO), Take  by mouth Daily., Disp: , Rfl:     carvedilol (COREG) 3.125 MG tablet, Take 1 tablet by mouth., Disp: , Rfl:     Cholecalciferol (VITAMIN D) 2000 units capsule, Take  by mouth Daily., Disp: , Rfl:     Entresto 24-26 MG tablet, Take 1 tablet by mouth 2 (Two) Times a Day., Disp: , Rfl:     Potassium 99 MG tablet, Take  by mouth Daily., Disp: , Rfl:     rosuvastatin (CRESTOR) 10 MG tablet, , Disp: , Rfl:     vitamin B-12 (cyanocobalamin) 100 MCG tablet, Take 1 tablet by mouth Daily., Disp: , Rfl:     No Known Allergies    Family History   Problem Relation Age of Onset    Kidney cancer Father 66    Leukemia Brother 48    Multiple myeloma Brother 47     Cancer-related family history includes Kidney cancer (age of onset: 66) in her father.    Social History     Tobacco Use    Smoking status: Every Day     Packs/day: 0.50     Years: 10.00     Additional pack years: 0.00     Total pack years: 5.00     Types: Cigarettes    Smokeless tobacco: Never    Tobacco comments:     started smoking when she was in her 20’s   Substance Use Topics    Alcohol use: No    Drug use: No     REVIEW OF SYSTEMS:  12 point system negative except above.    OBJECTIVE:    Vitals:    12/07/23 1349   Pulse: 78   Resp: 16   SpO2: 96%   Weight: 68.7 kg (151 lb 6.4 oz)   Height: 165.1 cm (65\")   PainSc: 0-No pain       ECOG  (0) Fully active, able to carry on all predisease performance without restriction    Physical Exam   HENT:   Head: Normocephalic.   Mouth/Throat: Mucous membranes are moist.   Eyes: Pupils are equal, round, and reactive to light.   Cardiovascular: Normal rate.   Pulmonary/Chest: Effort normal and breath sounds normal.   Abdominal: Normal appearance.   Musculoskeletal: Normal range of motion.   Lymphadenopathy:     She has no cervical adenopathy.   Neurological: She is alert.   Skin: Skin is warm.        RECENT LABS  Lab Results - Last 18 Months   Lab Units 11/30/23  1408 " "10/18/23  0809 10/15/23  0543   WBC 10*3/mm3 9.19 11.37* 9.81   HEMOGLOBIN g/dL 13.5 13.7 13.1   HEMATOCRIT % 42.7 43.1 40.6   PLATELETS 10*3/mm3 371 341 328   MCV fL 86.6 84.0 84.2     Lab Results - Last 18 Months   Lab Units 11/30/23  1408 09/28/23  0953 08/03/23  1326 07/31/23  1444 12/14/22  1426   SODIUM mmol/L 134*  --  137 137 138   POTASSIUM mmol/L 4.6  --  3.9 4.1 3.7   CHLORIDE mmol/L 100  --  98 101 101   CO2 mmol/L 23.0  --  28.0 27.0 27.0   BUN mg/dL 15  --  11 10 8   CREATININE mg/dL 0.62 0.70 0.78 1.35* 0.60   CALCIUM mg/dL 9.4  --  9.7 9.1 8.8   BILIRUBIN mg/dL 0.2  --   --  0.4 0.3   ALK PHOS U/L 111  --   --  110 110   ALT (SGPT) U/L 20  --   --  11 10   AST (SGOT) U/L 24  --   --  14 14   GLUCOSE mg/dL 101*  --  70 80 122*     Lab Results   Component Value Date    GLUCOSE 101 (H) 11/30/2023    BUN 15 11/30/2023    CREATININE 0.62 11/30/2023    EGFRIFNONA 83 12/09/2021    BCR 24.2 11/30/2023    K 4.6 11/30/2023    CO2 23.0 11/30/2023    CALCIUM 9.4 11/30/2023    ALBUMIN 3.7 11/30/2023    LABIL2 0.8 (L) 04/25/2019    AST 24 11/30/2023    ALT 20 11/30/2023     Lab Results   Component Value Date    IRON 54 11/30/2023    TIBC 331 11/30/2023    FERRITIN 179.90 (H) 11/30/2023     Lab Results   Component Value Date    FOLATE 17.20 10/26/2023     No results found for: \"OCCULTBLD\"  No results found for: \"RETICCTPCT\"  Lab Results   Component Value Date    ARZUTELU02 580 11/30/2023     No results found for: \"SPEP\", \"UPEP\"  LDH   Date Value Ref Range Status   11/30/2023 224 (H) 135 - 214 U/L Final     Comment:     Specimen hemolyzed.  Results may be affected.     Uric Acid   Date Value Ref Range Status   01/31/2019 2.4 (L) 2.6 - 8.0 mg/dL Final     Lab Results   Component Value Date    SEDRATE 61 (H) 08/08/2019     No results found for: \"FIBRINOGEN\", \"HAPTOGLOBIN\", \"DDIMER\"  No results found for: \"DDIMER\"  Lab Results   Component Value Date    PTT 29.7 10/14/2023    INR 1.2 10/14/2023     No results found for: " "\"\"  No results found for: \"CEA\"  No results found for: \"\"  No results found for: \"PSA\"  No results found for: \"SE8414\"    Assessment & Plan     There are no diagnoses linked to this encounter.    ASSESSMENT:    Diffuse large B-cell lymphoma of lymph nodes of neck (CMS/HCC): Status post 6 cycles of R-CHOP  finished 4/25/2019: Restaging CT scan 5/12/2020 does not show any evidence of lymphadenopathy in the neck chest.  Restaging CT scan on 11/23/2020 negative for recurrence.  Now another CT done in June 2021 with no evidence of disease recurrence. Repeat CT in 9/2023 with no concerns of recurrence. Will repeat in 9/2024 unless symptomatic. Cbc cmp ldh in 4 months.  Encounter for care related to vascular access port.  Can remove the port from my standpoint. She will see surgery when she is ready.  Elevated creatinine - improved  Iron deficiency anemia due to chronic blood loss:  Intermittently taking oral iron.  Tobacco use is slowing down now.  Aortic mural thrombus - was on coumadin initially this was stopped by vascular surgery.   Right breast nodule seen on previous CT scan.  No palpable abnormality.  Mammogram normal on 8/3/2020.  Normal mention of the recent CT.  Continue screening mammogram. She has been non compliant. She states that she has had it in 9/2023 and she states that it was fine.  Cholelithiasis - no symptoms      PLAN:    Continue screening mammogram with primary care physician  4 months with cbc cmp ldh week prior        "

## 2024-01-04 ENCOUNTER — HOSPITAL ENCOUNTER (OUTPATIENT)
Dept: ONCOLOGY | Facility: HOSPITAL | Age: 69
Discharge: HOME | End: 2024-01-04
Admitting: INTERNAL MEDICINE
Payer: MEDICARE

## 2024-01-04 DIAGNOSIS — Z45.2 ENCOUNTER FOR CARE RELATED TO VASCULAR ACCESS PORT: Primary | ICD-10-CM

## 2024-01-04 DIAGNOSIS — C83.31 DIFFUSE LARGE B-CELL LYMPHOMA OF LYMPH NODES OF NECK: ICD-10-CM

## 2024-01-04 PROCEDURE — 96523 IRRIG DRUG DELIVERY DEVICE: CPT

## 2024-01-04 PROCEDURE — 25010000002 HEPARIN LOCK FLUSH PER 10 UNITS: Performed by: INTERNAL MEDICINE

## 2024-01-04 RX ORDER — HEPARIN SODIUM (PORCINE) LOCK FLUSH IV SOLN 100 UNIT/ML 100 UNIT/ML
500 SOLUTION INTRAVENOUS AS NEEDED
Status: DISCONTINUED | OUTPATIENT
Start: 2024-01-04 | End: 2024-01-05 | Stop reason: HOSPADM

## 2024-01-04 RX ORDER — HEPARIN SODIUM (PORCINE) LOCK FLUSH IV SOLN 100 UNIT/ML 100 UNIT/ML
500 SOLUTION INTRAVENOUS AS NEEDED
OUTPATIENT
Start: 2024-01-04

## 2024-01-04 RX ORDER — SODIUM CHLORIDE 0.9 % (FLUSH) 0.9 %
20 SYRINGE (ML) INJECTION AS NEEDED
OUTPATIENT
Start: 2024-01-04

## 2024-01-04 RX ORDER — SODIUM CHLORIDE 0.9 % (FLUSH) 0.9 %
20 SYRINGE (ML) INJECTION AS NEEDED
Status: DISCONTINUED | OUTPATIENT
Start: 2024-01-04 | End: 2024-01-05 | Stop reason: HOSPADM

## 2024-01-04 RX ADMIN — Medication 20 ML: at 14:14

## 2024-01-04 RX ADMIN — HEPARIN 500 UNITS: 100 SYRINGE at 14:16

## 2024-01-04 NOTE — PROGRESS NOTES
Port accessed and flushed with good blood return noted. Port flushed with saline and heparin prior to needle removal. Pt aware of next appointment.

## 2024-02-15 ENCOUNTER — HOSPITAL ENCOUNTER (OUTPATIENT)
Dept: ONCOLOGY | Facility: HOSPITAL | Age: 69
Discharge: HOME | End: 2024-02-15
Payer: MEDICARE

## 2024-02-15 DIAGNOSIS — Z45.2 ENCOUNTER FOR CARE RELATED TO VASCULAR ACCESS PORT: ICD-10-CM

## 2024-02-15 DIAGNOSIS — C83.31 DIFFUSE LARGE B-CELL LYMPHOMA OF LYMPH NODES OF NECK: Primary | ICD-10-CM

## 2024-02-15 PROCEDURE — 96523 IRRIG DRUG DELIVERY DEVICE: CPT

## 2024-02-15 PROCEDURE — 25010000002 HEPARIN LOCK FLUSH PER 10 UNITS: Performed by: INTERNAL MEDICINE

## 2024-02-15 RX ORDER — SODIUM CHLORIDE 0.9 % (FLUSH) 0.9 %
20 SYRINGE (ML) INJECTION AS NEEDED
OUTPATIENT
Start: 2024-02-15

## 2024-02-15 RX ORDER — HEPARIN SODIUM (PORCINE) LOCK FLUSH IV SOLN 100 UNIT/ML 100 UNIT/ML
500 SOLUTION INTRAVENOUS AS NEEDED
Status: DISCONTINUED | OUTPATIENT
Start: 2024-02-15 | End: 2024-02-16 | Stop reason: HOSPADM

## 2024-02-15 RX ORDER — SODIUM CHLORIDE 0.9 % (FLUSH) 0.9 %
20 SYRINGE (ML) INJECTION AS NEEDED
Status: DISCONTINUED | OUTPATIENT
Start: 2024-02-15 | End: 2024-02-16 | Stop reason: HOSPADM

## 2024-02-15 RX ORDER — HEPARIN SODIUM (PORCINE) LOCK FLUSH IV SOLN 100 UNIT/ML 100 UNIT/ML
500 SOLUTION INTRAVENOUS AS NEEDED
OUTPATIENT
Start: 2024-02-15

## 2024-02-15 RX ADMIN — Medication 20 ML: at 14:00

## 2024-02-15 RX ADMIN — HEPARIN 500 UNITS: 100 SYRINGE at 14:02

## 2024-03-21 ENCOUNTER — HOSPITAL ENCOUNTER (OUTPATIENT)
Dept: ONCOLOGY | Facility: HOSPITAL | Age: 69
Discharge: HOME | End: 2024-03-21
Admitting: INTERNAL MEDICINE
Payer: MEDICARE

## 2024-03-21 DIAGNOSIS — C83.31 DIFFUSE LARGE B-CELL LYMPHOMA OF LYMPH NODES OF NECK: Primary | ICD-10-CM

## 2024-03-21 DIAGNOSIS — Z45.2 ENCOUNTER FOR CARE RELATED TO VASCULAR ACCESS PORT: ICD-10-CM

## 2024-03-21 PROCEDURE — 25010000002 HEPARIN LOCK FLUSH PER 10 UNITS: Performed by: INTERNAL MEDICINE

## 2024-03-21 PROCEDURE — 96523 IRRIG DRUG DELIVERY DEVICE: CPT

## 2024-03-21 RX ORDER — SODIUM CHLORIDE 0.9 % (FLUSH) 0.9 %
20 SYRINGE (ML) INJECTION AS NEEDED
OUTPATIENT
Start: 2024-03-21

## 2024-03-21 RX ORDER — HEPARIN SODIUM (PORCINE) LOCK FLUSH IV SOLN 100 UNIT/ML 100 UNIT/ML
500 SOLUTION INTRAVENOUS AS NEEDED
Status: DISCONTINUED | OUTPATIENT
Start: 2024-03-21 | End: 2024-03-22 | Stop reason: HOSPADM

## 2024-03-21 RX ORDER — SODIUM CHLORIDE 0.9 % (FLUSH) 0.9 %
20 SYRINGE (ML) INJECTION AS NEEDED
Status: DISCONTINUED | OUTPATIENT
Start: 2024-03-21 | End: 2024-03-22 | Stop reason: HOSPADM

## 2024-03-21 RX ORDER — HEPARIN SODIUM (PORCINE) LOCK FLUSH IV SOLN 100 UNIT/ML 100 UNIT/ML
500 SOLUTION INTRAVENOUS AS NEEDED
OUTPATIENT
Start: 2024-03-21

## 2024-03-21 RX ADMIN — HEPARIN 500 UNITS: 100 SYRINGE at 14:40

## 2024-03-21 RX ADMIN — Medication 20 ML: at 14:38

## 2024-04-18 ENCOUNTER — HOSPITAL ENCOUNTER (OUTPATIENT)
Dept: ONCOLOGY | Facility: HOSPITAL | Age: 69
Discharge: HOME | End: 2024-04-18
Admitting: INTERNAL MEDICINE
Payer: MEDICARE

## 2024-04-18 DIAGNOSIS — Z45.2 ENCOUNTER FOR CARE RELATED TO VASCULAR ACCESS PORT: ICD-10-CM

## 2024-04-18 DIAGNOSIS — D50.0 IRON DEFICIENCY ANEMIA DUE TO CHRONIC BLOOD LOSS: ICD-10-CM

## 2024-04-18 DIAGNOSIS — C83.31 DIFFUSE LARGE B-CELL LYMPHOMA OF LYMPH NODES OF NECK: Primary | ICD-10-CM

## 2024-04-18 LAB
ALBUMIN SERPL-MCNC: 4 G/DL (ref 3.5–5.2)
ALBUMIN/GLOB SERPL: 1.3 G/DL
ALP SERPL-CCNC: 129 U/L (ref 39–117)
ALT SERPL W P-5'-P-CCNC: 24 U/L (ref 1–33)
ANION GAP SERPL CALCULATED.3IONS-SCNC: 10 MMOL/L (ref 5–15)
AST SERPL-CCNC: 19 U/L (ref 1–32)
BASOPHILS # BLD AUTO: 0.04 10*3/MM3 (ref 0–0.2)
BASOPHILS NFR BLD AUTO: 0.4 % (ref 0–1.5)
BILIRUB SERPL-MCNC: <0.2 MG/DL (ref 0–1.2)
BUN SERPL-MCNC: 10 MG/DL (ref 8–23)
BUN/CREAT SERPL: 12.7 (ref 7–25)
CALCIUM SPEC-SCNC: 9.4 MG/DL (ref 8.6–10.5)
CHLORIDE SERPL-SCNC: 102 MMOL/L (ref 98–107)
CO2 SERPL-SCNC: 26 MMOL/L (ref 22–29)
CREAT SERPL-MCNC: 0.79 MG/DL (ref 0.57–1)
DEPRECATED RDW RBC AUTO: 41.6 FL (ref 37–54)
EGFRCR SERPLBLD CKD-EPI 2021: 82.1 ML/MIN/1.73
EOSINOPHIL # BLD AUTO: 0.32 10*3/MM3 (ref 0–0.4)
EOSINOPHIL NFR BLD AUTO: 3.2 % (ref 0.3–6.2)
ERYTHROCYTE [DISTWIDTH] IN BLOOD BY AUTOMATED COUNT: 12.6 % (ref 12.3–15.4)
FERRITIN SERPL-MCNC: 90.46 NG/ML (ref 13–150)
GLOBULIN UR ELPH-MCNC: 3 GM/DL
GLUCOSE SERPL-MCNC: 106 MG/DL (ref 65–99)
HCT VFR BLD AUTO: 40.8 % (ref 34–46.6)
HGB BLD-MCNC: 13.4 G/DL (ref 12–15.9)
IRON 24H UR-MRATE: 29 MCG/DL (ref 37–145)
IRON SATN MFR SERPL: 9 % (ref 20–50)
LDH SERPL-CCNC: 151 U/L (ref 135–214)
LYMPHOCYTES # BLD AUTO: 2.24 10*3/MM3 (ref 0.7–3.1)
LYMPHOCYTES NFR BLD AUTO: 22.6 % (ref 19.6–45.3)
MCH RBC QN AUTO: 30.1 PG (ref 26.6–33)
MCHC RBC AUTO-ENTMCNC: 32.8 G/DL (ref 31.5–35.7)
MCV RBC AUTO: 91.7 FL (ref 79–97)
MONOCYTES # BLD AUTO: 0.68 10*3/MM3 (ref 0.1–0.9)
MONOCYTES NFR BLD AUTO: 6.9 % (ref 5–12)
NEUTROPHILS NFR BLD AUTO: 6.62 10*3/MM3 (ref 1.7–7)
NEUTROPHILS NFR BLD AUTO: 66.9 % (ref 42.7–76)
PLATELET # BLD AUTO: 339 10*3/MM3 (ref 140–450)
PMV BLD AUTO: 9.2 FL (ref 6–12)
POTASSIUM SERPL-SCNC: 4.3 MMOL/L (ref 3.5–5.2)
PROT SERPL-MCNC: 7 G/DL (ref 6–8.5)
RBC # BLD AUTO: 4.45 10*6/MM3 (ref 3.77–5.28)
SODIUM SERPL-SCNC: 138 MMOL/L (ref 136–145)
TIBC SERPL-MCNC: 340 MCG/DL (ref 298–536)
TRANSFERRIN SERPL-MCNC: 228 MG/DL (ref 200–360)
WBC NRBC COR # BLD AUTO: 9.9 10*3/MM3 (ref 3.4–10.8)

## 2024-04-18 PROCEDURE — 85025 COMPLETE CBC W/AUTO DIFF WBC: CPT | Performed by: INTERNAL MEDICINE

## 2024-04-18 PROCEDURE — 84466 ASSAY OF TRANSFERRIN: CPT | Performed by: INTERNAL MEDICINE

## 2024-04-18 PROCEDURE — 83540 ASSAY OF IRON: CPT | Performed by: INTERNAL MEDICINE

## 2024-04-18 PROCEDURE — 25010000002 HEPARIN LOCK FLUSH PER 10 UNITS: Performed by: INTERNAL MEDICINE

## 2024-04-18 PROCEDURE — 80053 COMPREHEN METABOLIC PANEL: CPT | Performed by: INTERNAL MEDICINE

## 2024-04-18 PROCEDURE — 83615 LACTATE (LD) (LDH) ENZYME: CPT | Performed by: INTERNAL MEDICINE

## 2024-04-18 PROCEDURE — 82728 ASSAY OF FERRITIN: CPT | Performed by: INTERNAL MEDICINE

## 2024-04-18 PROCEDURE — 36591 DRAW BLOOD OFF VENOUS DEVICE: CPT

## 2024-04-18 RX ORDER — HEPARIN SODIUM (PORCINE) LOCK FLUSH IV SOLN 100 UNIT/ML 100 UNIT/ML
500 SOLUTION INTRAVENOUS AS NEEDED
Status: DISCONTINUED | OUTPATIENT
Start: 2024-04-18 | End: 2024-04-19 | Stop reason: HOSPADM

## 2024-04-18 RX ORDER — HEPARIN SODIUM (PORCINE) LOCK FLUSH IV SOLN 100 UNIT/ML 100 UNIT/ML
500 SOLUTION INTRAVENOUS AS NEEDED
OUTPATIENT
Start: 2024-04-18

## 2024-04-18 RX ORDER — SODIUM CHLORIDE 0.9 % (FLUSH) 0.9 %
20 SYRINGE (ML) INJECTION AS NEEDED
OUTPATIENT
Start: 2024-04-18

## 2024-04-18 RX ORDER — SODIUM CHLORIDE 0.9 % (FLUSH) 0.9 %
20 SYRINGE (ML) INJECTION AS NEEDED
Status: DISCONTINUED | OUTPATIENT
Start: 2024-04-18 | End: 2024-04-19 | Stop reason: HOSPADM

## 2024-04-18 RX ADMIN — Medication 20 ML: at 14:08

## 2024-04-18 RX ADMIN — HEPARIN 500 UNITS: 100 SYRINGE at 14:10

## 2024-04-25 ENCOUNTER — OFFICE VISIT (OUTPATIENT)
Dept: ONCOLOGY | Facility: CLINIC | Age: 69
End: 2024-04-25
Payer: MEDICARE

## 2024-04-25 VITALS
RESPIRATION RATE: 16 BRPM | HEART RATE: 77 BPM | HEIGHT: 65 IN | DIASTOLIC BLOOD PRESSURE: 83 MMHG | SYSTOLIC BLOOD PRESSURE: 136 MMHG | OXYGEN SATURATION: 97 % | TEMPERATURE: 97.4 F | WEIGHT: 151 LBS | BODY MASS INDEX: 25.16 KG/M2

## 2024-04-25 DIAGNOSIS — Z45.2 ENCOUNTER FOR CARE RELATED TO VASCULAR ACCESS PORT: ICD-10-CM

## 2024-04-25 DIAGNOSIS — C83.31 DIFFUSE LARGE B-CELL LYMPHOMA OF LYMPH NODES OF NECK: ICD-10-CM

## 2024-04-25 DIAGNOSIS — D50.0 IRON DEFICIENCY ANEMIA DUE TO CHRONIC BLOOD LOSS: ICD-10-CM

## 2024-04-25 NOTE — PROGRESS NOTES
Hematology/Oncology Outpatient Follow Up    PATIENT NAME:Sarah Borja  :1955  MRN: [id number]  PRIMARY CARE PHYSICIAN: Miguel Lezama MD  REFERRING PHYSICIAN: Miguel Lezama, *    Chief Complaint   Patient presents with    Follow-up     Diffuse large B-cell lymphoma of lymph nodes of neck            HISTORY OF PRESENT ILLNESS:   Right tonsillar diffuse large B-cell lymphoma diagnosed 2018.  This  female who claims to have developed a sore throat in 2018. She saw her primary care provider, Tran Vaz N.P. and was prescribed antibiotic. She did have palpable lymph nodes and a CT scan of the soft tissue neck was performed on 10/11/18 revealing asymmetric enlargement of the right palatine tonsil. There was right jugular digastric and posterior triangle adenopathy. The largest lymph node within the posterior triangle measured approximately 3.6 x 3.2 x 2.0 cm. She was referred to Sly Rosario M.D. and was seen by him in initial consultation on 18 where laryngoscopy revealed right tonsillar mass. FNA was performed on in on 10/31/18 with specimen sent for pathology, but routine staining was not performed. Flow cytometry revealed a CD10 positive monoclonal B-cell population which by light scatter analysis fell into both the small cell and large cell regions, though predominantly into the large cell region. FISH analysis for BCL2, BCL6 and MYC were negative. Though Dr. Rosario had requested routine staining of the specimen, this was not performed and Pathology had wanted a more fresh specimen, prior to which she was referred to me for consultation. The patient today is denying any weight loss. She does complain of low-grade fevers controlled with Aspirin and claims to have had night sweats on and off for 10 years.   18 - [facility] cell non-Hodgkin's lymphoma of the right tonsil on referral by Sly  REECE Rosario WBC 10.9 with 69% neutrophils, 23% lymphocytes, 6% monocytes, hemoglobin 13.8, platelet count 408,000.  ().  Globulin 4.1 (2.5-3.8), uric acid 5.0 (2.6-8.0), ESR 61 (0-30).      11/16/18 - Bone marrow aspiration and biopsy with normocellular marrow with maturing trilineage hematopoiesis. Immunohistochemistry had no increase in CD34 positive blasts or CD20 positive B-lymphocytes. Flow cytometry had no evidence of an abnormal cell type. Cytogenetics revealed 48 XX normal female karyotype. Echocardiogram with mild mitral and mild tricuspid regurgitation, moderate concentric left ventricular hypertrophy and LV systolic function normal with EF about 65-70%.   11/17/18 - Echocardiogram with mild mitral and tricuspid regurgitation, moderate concentric left ventricular hypertrophy, LV systolic function normal with EF about 65-70%.   11/19/18 - Chest x-ray with clear lungs.   11/20/18 - PET scan with extensive right neck lymphadenopathy beginning at the right tonsillar pillar and continuing to the supraclavicular region with SUV between 26 and 16 with extremely hypermetabolic lymph nodes, changes of cholelithiasis and old healed granulomatous disease. Mass of the nodes at L2 are approximately 6.5 x 4.2 cm at the level 3 adenopathy is also seen over an area of 5.5 x 2.8 submandibular gland and sternocleidomastoid muscle are poorly-delineated in the adenopathy. Supraclavicular lymph node is seen measuring over 24 mm. No hypermetabolic activity in the abdomen. Mild infrarenal abdominal aortic aneurysm measuring 28 mm.   11/29/18 - Patient underwent Infusaport placement by Ashish Mcginnis M.D.   11/30/18 - WBC 8.5, hemoglobin 12.7, platelet count 358,000. Discussed workup results. Patient started on Allopurinol 300 mg p.o. daily with repeat tonsillar biopsy planned for histology. SPEP with hypoalbuminemia. Hepatitis B core antibody surface antigen, hepatis C antibody all non-reactive.   12/19/18 -  Patient underwent ultrasound-guided needle biopsy of right neck mass by Ashish Funk M.D. with pathology revealing diffuse large B-cell lymphoma germinal center type. Flow cytometry revealed CD10 positive B-cell lymphoma. The lymphoma was CD20 and surface kappa and lambda chain positive.   12/27/18 - Chemotherapy orders written for R-CHOP. Rituximab 375 mg/M2 IV day 1, Cyclophosphamide 750 mg/M2 IV day 1, Doxorubicin 50 mg/M2 IV day 1, Vincristine 1.4 mg/M2 IV day 1 with a max of 2 mg dose and Prednisone 100 mg p.o. days 3-8 to cycle every 21 days for about six cycles in a curative intent. Neulasta Onpro also ordered.    1/3/19 - Discussed results of workup and chemotherapy.   1/10/19 - Cycle 1 chemotherapy given.   1/17/19 - WBC 2.1 with 28% neutrophils, 49% lymphocytes, 8% monocytes, hemoglobin 12.9, platelet count 234,000.   1/22/19 - Patient tolerating chemotherapy well. Sed rate 49 (0-30).  ESR 49 (H).   1/31/19 - Cycle 2 R-CHOP initiated with Neulasta support. Sed rate 81 (0-30).  ESR 81 (H). Uric acid 2.4 (L). Sodium 133 (L).   2/7/19 - Labs at [facility] with verbal report of WBC 1.5 and ANC 0.41. Cipro 500 mg p.o. b.i.d. x7 days as prophylaxis prescribed.   2/12/19 - Patient tolerating chemotherapy overall well to date with some expected taste changes and constipation. Patient advised she may use MiraLAX p.r.n. and patient advised to play with diet as well use of sour candies or mints to stimulate the taste buds. A brief period of neutropenia without febrile neutropenia noted during the last cycle with Neulasta given. Allopurinol discontinued.   2/21/19 - Ferritin 121 (N), iron 18 (L),  (N), iron saturation 6% (L). Prescribed Ferrous Sulfate 325 mg daily. Received cycle 3 day 1 R-CHOP. Port will not draw. Activase utilized and started on Coumadin 1 mg p.o. daily.   2/28/19 - Stool heme negative x3. CT neck and chest with contrast showed previously-identified mass centered with right  palatine tonsils appears to have resolved. Previously-identified right cervical lymphadenopathy has resolved. No evidence of lymphadenopathy within the chest. 4 mm left upper lobe nodule is stable from prior PET CT.  This is indeterminate. Recommend attention to follow up. Echocardiogram showed aortic valve sclerosis, EF 65-70%. No pericardial effusion.   3/6/19 - Discussed with the patient the option of either continuing with chemotherapy for a total of six cycles versus stopping chemotherapy and proceeding with radiation to her site of disease. Patient would like to complete planned chemotherapy course.   3/6/19 - PT 11.8, INR 1.0.  Patient continued on Coumadin 1 mg p.o. daily for port malfunction.  ESR 64, high.    3/14/19 - Cycle 4 chemotherapy given.   4/4/19 - Cycle 5 chemotherapy given.   4/16/19 - Patient complains of upper respiratory symptoms which are improving on Cipro.   4/25/19 - Cycle 6 chemotherapy given. Urinalysis showed trace heme.   5/5/19 - . Prescribed Ciprofloxacin 500 mg p.o. b.i.d. x7 days.   5/8/19 - Echocardiogram 60-65% ejection fraction with mild mitral and tricuspid regurgitation. CT soft tissue neck with no acute process or adenopathy seen. CT chest, abdomen and pelvis showed no acute cardiopulmonary process, no suspicious lymphadenopathy. Previously described 4 mm nodule in the left upper lobe is likely an intrafissural lymph node. No acute intraabdominal or intrapelvic process. No suspicious lymphadenopathy. Atherosclerosis with infrarenal abdominal aortic ectasia/aneurysm with mural thrombus formation. Coumadin increased to 5 mg p.o. Q.PM.  8/8/2019 Coumadin dose decreased to 1 mg p.o. daily for [id number] maintenance. Sed rate 61 (0-30).  ().  11/1/19 - CT soft tissue neck showed no evidence of recurrent or metastatic disease in the neck in this patient with a history of lymphoma. No acute findings. CT chest abdomen and pelvis showed No convincing evidence  of recurrent or metastatic disease in the chest, abdomen or pelvis. Stable 3.2 cm fusiform infrarenal abdominal aortic aneurysm with 2% luminal stenosis secondary to atherosclerotic plaquing, and irregular ulcerated plaquing. Stable 4 mm noncalcified left upper lobe pulmonary nodule since 11/20/2018. Benign etiology is favored. This may represent a noncalcified granuloma, given the extensive granulomatous changes elsewhere within the chest.  Uncomplicated cholelithiasis.  5/12/2020: CT soft tissue neck with contrast, CT chest with contrast: No evidence of lymphadenopathy within the neck or chest.  Stable small nodule in the right breast likely benign.  Recommend correlation with mammography.  8/3/2020: Bilateral 3D mammogram: BI-RADS 2 benign.  No evidence of malignancy.  Will circumscribed masses within both breasts, more numerous in the left breast, appear relatively stable from prior mammograms from 2014.   9/1/2020   11/23/2020 No convincing evidence of recurrent disease or metastatic disease in the chest, abdomen, or pelvis. A 4 mm noncalcified left upper lobe nodule is stable since the most remote available PET/CT from 11/20/2018. This confirms over 2 years of stability and benignity.  Stable 3.2 cm fusiform infrarenal abdominal aortic aneurysm with irregular and eccentric mural thrombus.  Uncomplicated cholelithiasis.   6/3/2021: WBC 10.02, hemoglobin 14.2, platelets 329  6/17/2021: CT chest abdomen pelvis with stable 4 mm noncalcified left upper lobe lung nodule unchanged from the prior examination.  Stable since PET/CT from 11/20/2018.  Confirming 2 years of stability.  No further surveillance needed.  No other sites of disease in the chest abdomen pelvis.  7/2022 - CBC CMP LDH unremarkable.  1/2023 - CBC CMP LDH unremarkable  7/2023 - increaed creatinine, rest of cbc cmp ldh unremarkable.  9/21/2023 - hb down to 11.9,wbc and plt normal.  11/30/23 - Hb 13.5, WBC 9.19, plt 371,  4/2024 - CBC normal,  Hb 13.4, , ferritin 90, iron sat 9      2.   Iron deficiency anemia diagnosed February 2018.   1/31/19 - Hemoglobin 11.9, MCV 87.9 and RDW 14.9.   2/21/19 - Ferritin 121 (N), iron 18 (L),  (N), iron saturation 6 (L). Prescribed Ferrous Sulfate 325 mg one tablet p.o. daily.   2/28/19 - Stool heme negative x3.   3/26/19 - Patient reports she is not taking ferrous sulfate daily as prescribed.  She chose to increase her intake of red meat.  Instructed to start taking ferrous sulfate 325 mg p.o. daily.  Explained rationale.   5/14/19 - 160 (). Iron 17 (). TIBC 283 (228-428). Iron saturation 6 (15-50).  Ferritin 160.  8/8/2019 hemoglobin 13.2, MCV 82. Ferritin 72 ().   5/21/2020: Ferritin 107.3, iron saturation 17 low, serum iron 57, TIBC 332, , creatinine 0.55, LFTs normal, WBC 8.9, hemoglobin 14.6, platelets 341, MCV 90.6  12/3/2020 WBC 10.1, Hgb 14.5, Plts 336  11/30/23 - ferritin 179, iron sat 16  4/2024 - ferritin 90, iron sat 9    3.   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   5/9/19 - Prescribed Coumadin 5 mg p.o. daily. Referred to Dr. Duckworth for evaluation of infrarenal aortic aneurysm and mural thrombus seen on CT abdomen and pelvis.   5/14/19 - INR 1.4. Increased Coumadin to 6 mg p.o. daily. Follow INR protocol. INR’s to be done at [facility]. Prescription given.  8/8/2019 patient claims to have been seen by Dr. Duckworth and advised to stop warfarin.      SUBJECTIVE:   No new concerns. No constitutional symptoms      Past Medical History:   Diagnosis Date    Hypertension 1998     No past surgical history on file.      Current Outpatient Medications:     amLODIPine (NORVASC) 2.5 MG tablet, , Disp: , Rfl:     ascorbic acid (VITAMIN C) 250 MG tablet, Take 1 tablet by mouth Daily., Disp: , Rfl:     aspirin 81 MG EC tablet, Every other day, Disp: , Rfl:     B Complex Vitamins (VITAMIN B COMPLEX 100 IJ), Inject  as directed., Disp: ,  "Rfl:     buPROPion SR (WELLBUTRIN SR) 150 MG 12 hr tablet, Take 1 tablet by mouth., Disp: , Rfl:     Calcium-Magnesium-Vitamin D (CALCIUM 500 PO), Take  by mouth Daily., Disp: , Rfl:     Cholecalciferol (VITAMIN D) 2000 units capsule, Take  by mouth Daily., Disp: , Rfl:     Entresto 24-26 MG tablet, Take 1 tablet by mouth 2 (Two) Times a Day., Disp: , Rfl:     Potassium 99 MG tablet, Take  by mouth Daily., Disp: , Rfl:     rosuvastatin (CRESTOR) 10 MG tablet, , Disp: , Rfl:     SPIRONOLACTONE PO, , Disp: , Rfl:     vitamin B-12 (cyanocobalamin) 100 MCG tablet, Take 1 tablet by mouth Daily., Disp: , Rfl:     carvedilol (COREG) 3.125 MG tablet, Take 1 tablet by mouth., Disp: , Rfl:     No Known Allergies    Family History   Problem Relation Age of Onset    Kidney cancer Father 66    Leukemia Brother 48    Multiple myeloma Brother 47     Cancer-related family history includes Kidney cancer (age of onset: 66) in her father.    Social History     Tobacco Use    Smoking status: Every Day     Current packs/day: 0.50     Average packs/day: 0.5 packs/day for 10.0 years (5.0 ttl pk-yrs)     Types: Cigarettes    Smokeless tobacco: Never    Tobacco comments:     started smoking when she was in her 20’s   Substance Use Topics    Alcohol use: No    Drug use: No     REVIEW OF SYSTEMS:  12 point system negative except above.    OBJECTIVE:    Vitals:    04/25/24 1413   BP: 136/83   Pulse: 77   Resp: 16   Temp: 97.4 °F (36.3 °C)   SpO2: 97%   Weight: 68.5 kg (151 lb)   Height: 165.1 cm (65\")   PainSc: 0-No pain         ECOG  (0) Fully active, able to carry on all predisease performance without restriction    Physical Exam   HENT:   Head: Normocephalic.   Mouth/Throat: Mucous membranes are moist.   Eyes: Pupils are equal, round, and reactive to light.   Cardiovascular: Normal rate.   Pulmonary/Chest: Effort normal and breath sounds normal.   Abdominal: Normal appearance.   Musculoskeletal: Normal range of motion.   Lymphadenopathy:    " " She has no cervical adenopathy.   Neurological: She is alert.   Skin: Skin is warm.        RECENT LABS  Lab Results - Last 18 Months   Lab Units 04/18/24  1410 11/30/23  1408 10/18/23  0809   WBC 10*3/mm3 9.90 9.19 11.37*   HEMOGLOBIN g/dL 13.4 13.5 13.7   HEMATOCRIT % 40.8 42.7 43.1   PLATELETS 10*3/mm3 339 371 341   MCV fL 91.7 86.6 84.0     Lab Results - Last 18 Months   Lab Units 04/18/24  1410 11/30/23  1408 09/28/23  0953 08/03/23  1326 07/31/23  1444   SODIUM mmol/L 138 134*  --  137 137   POTASSIUM mmol/L 4.3 4.6  --  3.9 4.1   CHLORIDE mmol/L 102 100  --  98 101   CO2 mmol/L 26.0 23.0  --  28.0 27.0   BUN mg/dL 10 15  --  11 10   CREATININE mg/dL 0.79 0.62 0.70 0.78 1.35*   CALCIUM mg/dL 9.4 9.4  --  9.7 9.1   BILIRUBIN mg/dL <0.2 0.2  --   --  0.4   ALK PHOS U/L 129* 111  --   --  110   ALT (SGPT) U/L 24 20  --   --  11   AST (SGOT) U/L 19 24  --   --  14   GLUCOSE mg/dL 106* 101*  --  70 80     Lab Results   Component Value Date    GLUCOSE 106 (H) 04/18/2024    BUN 10 04/18/2024    CREATININE 0.79 04/18/2024    EGFRIFNONA 83 12/09/2021    BCR 12.7 04/18/2024    K 4.3 04/18/2024    CO2 26.0 04/18/2024    CALCIUM 9.4 04/18/2024    ALBUMIN 4.0 04/18/2024    LABIL2 0.8 (L) 04/25/2019    AST 19 04/18/2024    ALT 24 04/18/2024     Lab Results   Component Value Date    IRON 29 (L) 04/18/2024    TIBC 340 04/18/2024    FERRITIN 90.46 04/18/2024     Lab Results   Component Value Date    FOLATE 17.20 10/26/2023     No results found for: \"OCCULTBLD\"  No results found for: \"RETICCTPCT\"  Lab Results   Component Value Date    FPNECJVS54 580 11/30/2023     No results found for: \"SPEP\", \"UPEP\"  LDH   Date Value Ref Range Status   04/18/2024 151 135 - 214 U/L Final     Uric Acid   Date Value Ref Range Status   01/31/2019 2.4 (L) 2.6 - 8.0 mg/dL Final     Lab Results   Component Value Date    SEDRATE 61 (H) 08/08/2019     No results found for: \"FIBRINOGEN\", \"HAPTOGLOBIN\", \"DDIMER\"  No results found for: \"DDIMER\"  Lab " "Results   Component Value Date    PTT 29.7 10/14/2023    INR 1.2 10/14/2023     No results found for: \"\"  No results found for: \"CEA\"  No results found for: \"\"  No results found for: \"PSA\"  No results found for: \"BL6525\"    Assessment & Plan     There are no diagnoses linked to this encounter.    ASSESSMENT:    Diffuse large B-cell lymphoma of lymph nodes of neck (CMS/HCC): Status post 6 cycles of R-CHOP  finished 4/25/2019: Restaging CT scan 5/12/2020 does not show any evidence of lymphadenopathy in the neck chest.  Restaging CT scan on 11/23/2020 negative for recurrence.  Now another CT done in June 2021 with no evidence of disease recurrence. Repeat CT in 9/2023 with no concerns of recurrence. Will repeat in 9/2024 unless symptomatic. Repeat CT imaging cbc cmp ldh in 6 months and follo wup.  Encounter for care related to vascular access port.  Can remove the port from my standpoint. She will see surgery when she is ready. Refer to her surgoen for removal  Elevated creatinine - improved  Iron deficiency anemia due to chronic blood loss:  Intermittently taking oral iron.  Tobacco use is slowing down now.  Aortic mural thrombus - was on coumadin initially this was stopped by vascular surgery.   Right breast nodule seen on previous CT scan.  No palpable abnormality.  Mammogram normal on 8/3/2020.  Normal mention of the recent CT.  Continue screening mammogram. She has been non compliant. She states that she has had it in 9/2023 and she states that it was fine.  Cholelithiasis - no symptoms      PLAN:    Continue screening mammogram with primary care physician  6 months with labs and CT imaging        "

## 2024-05-06 ENCOUNTER — OFFICE VISIT (OUTPATIENT)
Dept: SURGERY | Facility: CLINIC | Age: 69
End: 2024-05-06
Payer: MEDICARE

## 2024-05-06 VITALS
OXYGEN SATURATION: 98 % | SYSTOLIC BLOOD PRESSURE: 162 MMHG | DIASTOLIC BLOOD PRESSURE: 96 MMHG | HEART RATE: 78 BPM | TEMPERATURE: 97.8 F | RESPIRATION RATE: 16 BRPM | WEIGHT: 164.2 LBS | HEIGHT: 65 IN | BODY MASS INDEX: 27.36 KG/M2

## 2024-05-06 DIAGNOSIS — Z95.828 PORT-A-CATH IN PLACE: Primary | ICD-10-CM

## 2024-05-06 PROCEDURE — 1159F MED LIST DOCD IN RCRD: CPT | Performed by: SURGERY

## 2024-05-06 PROCEDURE — 1160F RVW MEDS BY RX/DR IN RCRD: CPT | Performed by: SURGERY

## 2024-05-06 PROCEDURE — 99204 OFFICE O/P NEW MOD 45 MIN: CPT | Performed by: SURGERY

## 2024-05-07 ENCOUNTER — TELEPHONE (OUTPATIENT)
Dept: SURGERY | Facility: CLINIC | Age: 69
End: 2024-05-07
Payer: MEDICARE

## 2024-05-15 ENCOUNTER — PATIENT ROUNDING (BHMG ONLY) (OUTPATIENT)
Dept: SURGERY | Facility: CLINIC | Age: 69
End: 2024-05-15
Payer: MEDICARE

## 2024-05-15 NOTE — PROGRESS NOTES
Tell me about your visit with us. What things went well?  VOICEMAIL            
IMPROVE-DD Application Not Available

## 2024-05-23 ENCOUNTER — HOSPITAL ENCOUNTER (OUTPATIENT)
Dept: ONCOLOGY | Facility: HOSPITAL | Age: 69
Discharge: HOME | End: 2024-05-23
Admitting: INTERNAL MEDICINE
Payer: MEDICARE

## 2024-05-23 DIAGNOSIS — C83.31 DIFFUSE LARGE B-CELL LYMPHOMA OF LYMPH NODES OF NECK: Primary | ICD-10-CM

## 2024-05-23 DIAGNOSIS — Z45.2 ENCOUNTER FOR CARE RELATED TO VASCULAR ACCESS PORT: ICD-10-CM

## 2024-05-23 PROCEDURE — 96523 IRRIG DRUG DELIVERY DEVICE: CPT

## 2024-05-23 PROCEDURE — 25010000002 HEPARIN LOCK FLUSH PER 10 UNITS: Performed by: INTERNAL MEDICINE

## 2024-05-23 RX ORDER — HEPARIN SODIUM (PORCINE) LOCK FLUSH IV SOLN 100 UNIT/ML 100 UNIT/ML
500 SOLUTION INTRAVENOUS AS NEEDED
OUTPATIENT
Start: 2024-05-23

## 2024-05-23 RX ORDER — SODIUM CHLORIDE 0.9 % (FLUSH) 0.9 %
20 SYRINGE (ML) INJECTION AS NEEDED
Status: DISCONTINUED | OUTPATIENT
Start: 2024-05-23 | End: 2024-05-24 | Stop reason: HOSPADM

## 2024-05-23 RX ORDER — HEPARIN SODIUM (PORCINE) LOCK FLUSH IV SOLN 100 UNIT/ML 100 UNIT/ML
500 SOLUTION INTRAVENOUS AS NEEDED
Status: DISCONTINUED | OUTPATIENT
Start: 2024-05-23 | End: 2024-05-24 | Stop reason: HOSPADM

## 2024-05-23 RX ORDER — SODIUM CHLORIDE 0.9 % (FLUSH) 0.9 %
20 SYRINGE (ML) INJECTION AS NEEDED
OUTPATIENT
Start: 2024-05-23

## 2024-05-23 RX ADMIN — HEPARIN 500 UNITS: 100 SYRINGE at 14:01

## 2024-05-23 RX ADMIN — Medication 20 ML: at 14:01

## 2024-05-23 NOTE — PROGRESS NOTES
Pt to the clinic for port flush. Port accessed using sterile technique with positive blood return noted.  Port flushed with 20cc normal saline and 500 units heparin then deaccessed. Pt tolerated well. Pt d/c home.

## 2024-06-12 ENCOUNTER — PROCEDURE VISIT (OUTPATIENT)
Dept: SURGERY | Facility: CLINIC | Age: 69
End: 2024-06-12
Payer: MEDICARE

## 2024-06-12 VITALS
HEIGHT: 65 IN | SYSTOLIC BLOOD PRESSURE: 149 MMHG | WEIGHT: 163 LBS | TEMPERATURE: 98.2 F | DIASTOLIC BLOOD PRESSURE: 101 MMHG | OXYGEN SATURATION: 98 % | BODY MASS INDEX: 27.16 KG/M2 | HEART RATE: 81 BPM

## 2024-06-12 DIAGNOSIS — Z95.828 PORT-A-CATH IN PLACE: Primary | ICD-10-CM

## 2024-06-12 NOTE — PROGRESS NOTES
Patient here for port flush. Port flushed without difficulty after obtained a good blood return. Patient aware of next appointment.  
None known

## 2024-06-20 NOTE — PROGRESS NOTES
Operative Note    Patient Name:  Sarah Borja  YOB: 1955    Date of Surgery:  6/12/2024     Indications:  Patient is a 69-year-old lady who presented with a Port-A-Cath in place which was initially placed for right tonsillar diffuse large B-cell lymphoma diagnosed in October 2018.  She has completed her chemotherapy treatments and remains in remission and now no longer needs her Port-A-Cath.  It has been requested that her Port-A-Cath be removed    Pre-op Diagnosis:   Port-A-Cath in place    Post-op Diagnosis:  Post-Op Diagnosis Codes:  Same    Procedure/CPT® Codes:  Port-A-Cath removal    Staff:  MD Ras-Surgeon    Anesthesia: 1% lidocaine with epinephrine    Estimated Blood Loss: Minimal    Implants:    None    Specimen:          None    Findings: Port-A-Cath removed in its entirety    Complications: None, immediately    Description of Procedure: After obtaining informed consent in the procedure room, the patient was placed in the supine position. The patient's neck and chest were then prepped and draped in the usual sterile fashion.  After a brief timeout the procedure began.  I began by infiltrating local anesthesia over the patient's prior incision site, and the making a skin incision and carried this down through the dermis to the subcutaneous fat layer.  Within the subcutaneous fat layer of the Port-A-Cath could be seen, and the help of the port was grasped with a clamp and elevated through the incision.  Using 3-0 Vicryl a U stitch was placed around the tract of the catheter, and the catheter was completely removed.  We then sutured the 3-0 Vicryl U stitch to close the catheter tract.  The entire port and catheter were removed from the subcutaneous space and passed off the field.  The subcutaneous fat layer was then closed using interrupted 3-0 Vicryl suture, and the skin was reapproximated using 4-0 Vicryl in a running subcuticular fashion.  The wound was dressed using skin glue.   The patient tolerated the procedure well, and left the office under her own power without any immediate complications.    Ashish Mcginnis MD     Date: 6/20/2024  Time: 15:34 EDT

## 2024-10-23 NOTE — PROGRESS NOTES
Hematology/Oncology Outpatient Follow Up    PATIENT NAME:Sarah Borja  :1955  MRN: [id number]  PRIMARY CARE PHYSICIAN: Miguel Lezama MD  REFERRING PHYSICIAN: Miguel Lezama, *    No chief complaint on file.       HISTORY OF PRESENT ILLNESS:   Right tonsillar diffuse large B-cell lymphoma diagnosed 2018.  This  female who claims to have developed a sore throat in 2018. She saw her primary care provider, Tran Vaz N.P. and was prescribed antibiotic. She did have palpable lymph nodes and a CT scan of the soft tissue neck was performed on 10/11/18 revealing asymmetric enlargement of the right palatine tonsil. There was right jugular digastric and posterior triangle adenopathy. The largest lymph node within the posterior triangle measured approximately 3.6 x 3.2 x 2.0 cm. She was referred to Sly Rosario M.D. and was seen by him in initial consultation on 18 where laryngoscopy revealed right tonsillar mass. FNA was performed on in on 10/31/18 with specimen sent for pathology, but routine staining was not performed. Flow cytometry revealed a CD10 positive monoclonal B-cell population which by light scatter analysis fell into both the small cell and large cell regions, though predominantly into the large cell region. FISH analysis for BCL2, BCL6 and MYC were negative. Though Dr. Rosario had requested routine staining of the specimen, this was not performed and Pathology had wanted a more fresh specimen, prior to which she was referred to me for consultation. The patient today is denying any weight loss. She does complain of low-grade fevers controlled with Aspirin and claims to have had night sweats on and off for 10 years.   18 - [facility] cell non-Hodgkin's lymphoma of the right tonsil on referral by Sly Rosario M.D. WBC 10.9 with 69% neutrophils, 23% lymphocytes, 6% monocytes, hemoglobin  13.8, platelet count 408,000.  ().  Globulin 4.1 (2.5-3.8), uric acid 5.0 (2.6-8.0), ESR 61 (0-30).      11/16/18 - Bone marrow aspiration and biopsy with normocellular marrow with maturing trilineage hematopoiesis. Immunohistochemistry had no increase in CD34 positive blasts or CD20 positive B-lymphocytes. Flow cytometry had no evidence of an abnormal cell type. Cytogenetics revealed 48 XX normal female karyotype. Echocardiogram with mild mitral and mild tricuspid regurgitation, moderate concentric left ventricular hypertrophy and LV systolic function normal with EF about 65-70%.   11/17/18 - Echocardiogram with mild mitral and tricuspid regurgitation, moderate concentric left ventricular hypertrophy, LV systolic function normal with EF about 65-70%.   11/19/18 - Chest x-ray with clear lungs.   11/20/18 - PET scan with extensive right neck lymphadenopathy beginning at the right tonsillar pillar and continuing to the supraclavicular region with SUV between 26 and 16 with extremely hypermetabolic lymph nodes, changes of cholelithiasis and old healed granulomatous disease. Mass of the nodes at L2 are approximately 6.5 x 4.2 cm at the level 3 adenopathy is also seen over an area of 5.5 x 2.8 submandibular gland and sternocleidomastoid muscle are poorly-delineated in the adenopathy. Supraclavicular lymph node is seen measuring over 24 mm. No hypermetabolic activity in the abdomen. Mild infrarenal abdominal aortic aneurysm measuring 28 mm.   11/29/18 - Patient underwent Infusaport placement by Ashish Mcginnis M.D.   11/30/18 - WBC 8.5, hemoglobin 12.7, platelet count 358,000. Discussed workup results. Patient started on Allopurinol 300 mg p.o. daily with repeat tonsillar biopsy planned for histology. SPEP with hypoalbuminemia. Hepatitis B core antibody surface antigen, hepatis C antibody all non-reactive.   12/19/18 - Patient underwent ultrasound-guided needle biopsy of right neck mass by Ashish Funk M.D.  with pathology revealing diffuse large B-cell lymphoma germinal center type. Flow cytometry revealed CD10 positive B-cell lymphoma. The lymphoma was CD20 and surface kappa and lambda chain positive.   12/27/18 - Chemotherapy orders written for R-CHOP. Rituximab 375 mg/M2 IV day 1, Cyclophosphamide 750 mg/M2 IV day 1, Doxorubicin 50 mg/M2 IV day 1, Vincristine 1.4 mg/M2 IV day 1 with a max of 2 mg dose and Prednisone 100 mg p.o. days 3-8 to cycle every 21 days for about six cycles in a curative intent. Neulasta Onpro also ordered.    1/3/19 - Discussed results of workup and chemotherapy.   1/10/19 - Cycle 1 chemotherapy given.   1/17/19 - WBC 2.1 with 28% neutrophils, 49% lymphocytes, 8% monocytes, hemoglobin 12.9, platelet count 234,000.   1/22/19 - Patient tolerating chemotherapy well. Sed rate 49 (0-30).  ESR 49 (H).   1/31/19 - Cycle 2 R-CHOP initiated with Neulasta support. Sed rate 81 (0-30).  ESR 81 (H). Uric acid 2.4 (L). Sodium 133 (L).   2/7/19 - Labs at [facility] with verbal report of WBC 1.5 and ANC 0.41. Cipro 500 mg p.o. b.i.d. x7 days as prophylaxis prescribed.   2/12/19 - Patient tolerating chemotherapy overall well to date with some expected taste changes and constipation. Patient advised she may use MiraLAX p.r.n. and patient advised to play with diet as well use of sour candies or mints to stimulate the taste buds. A brief period of neutropenia without febrile neutropenia noted during the last cycle with Neulasta given. Allopurinol discontinued.   2/21/19 - Ferritin 121 (N), iron 18 (L),  (N), iron saturation 6% (L). Prescribed Ferrous Sulfate 325 mg daily. Received cycle 3 day 1 R-CHOP. Port will not draw. Activase utilized and started on Coumadin 1 mg p.o. daily.   2/28/19 - Stool heme negative x3. CT neck and chest with contrast showed previously-identified mass centered with right palatine tonsils appears to have resolved. Previously-identified right cervical  lymphadenopathy has resolved. No evidence of lymphadenopathy within the chest. 4 mm left upper lobe nodule is stable from prior PET CT.  This is indeterminate. Recommend attention to follow up. Echocardiogram showed aortic valve sclerosis, EF 65-70%. No pericardial effusion.   3/6/19 - Discussed with the patient the option of either continuing with chemotherapy for a total of six cycles versus stopping chemotherapy and proceeding with radiation to her site of disease. Patient would like to complete planned chemotherapy course.   3/6/19 - PT 11.8, INR 1.0.  Patient continued on Coumadin 1 mg p.o. daily for port malfunction.  ESR 64, high.    3/14/19 - Cycle 4 chemotherapy given.   4/4/19 - Cycle 5 chemotherapy given.   4/16/19 - Patient complains of upper respiratory symptoms which are improving on Cipro.   4/25/19 - Cycle 6 chemotherapy given. Urinalysis showed trace heme.   5/5/19 - . Prescribed Ciprofloxacin 500 mg p.o. b.i.d. x7 days.   5/8/19 - Echocardiogram 60-65% ejection fraction with mild mitral and tricuspid regurgitation. CT soft tissue neck with no acute process or adenopathy seen. CT chest, abdomen and pelvis showed no acute cardiopulmonary process, no suspicious lymphadenopathy. Previously described 4 mm nodule in the left upper lobe is likely an intrafissural lymph node. No acute intraabdominal or intrapelvic process. No suspicious lymphadenopathy. Atherosclerosis with infrarenal abdominal aortic ectasia/aneurysm with mural thrombus formation. Coumadin increased to 5 mg p.o. Q.PM.  8/8/2019 Coumadin dose decreased to 1 mg p.o. daily for [id number] maintenance. Sed rate 61 (0-30).  ().  11/1/19 - CT soft tissue neck showed no evidence of recurrent or metastatic disease in the neck in this patient with a history of lymphoma. No acute findings. CT chest abdomen and pelvis showed No convincing evidence of recurrent or metastatic disease in the chest, abdomen or pelvis. Stable 3.2 cm  fusiform infrarenal abdominal aortic aneurysm with 2% luminal stenosis secondary to atherosclerotic plaquing, and irregular ulcerated plaquing. Stable 4 mm noncalcified left upper lobe pulmonary nodule since 11/20/2018. Benign etiology is favored. This may represent a noncalcified granuloma, given the extensive granulomatous changes elsewhere within the chest.  Uncomplicated cholelithiasis.  5/12/2020: CT soft tissue neck with contrast, CT chest with contrast: No evidence of lymphadenopathy within the neck or chest.  Stable small nodule in the right breast likely benign.  Recommend correlation with mammography.  8/3/2020: Bilateral 3D mammogram: BI-RADS 2 benign.  No evidence of malignancy.  Will circumscribed masses within both breasts, more numerous in the left breast, appear relatively stable from prior mammograms from 2014.   9/1/2020   11/23/2020 No convincing evidence of recurrent disease or metastatic disease in the chest, abdomen, or pelvis. A 4 mm noncalcified left upper lobe nodule is stable since the most remote available PET/CT from 11/20/2018. This confirms over 2 years of stability and benignity.  Stable 3.2 cm fusiform infrarenal abdominal aortic aneurysm with irregular and eccentric mural thrombus.  Uncomplicated cholelithiasis.   6/3/2021: WBC 10.02, hemoglobin 14.2, platelets 329  6/17/2021: CT chest abdomen pelvis with stable 4 mm noncalcified left upper lobe lung nodule unchanged from the prior examination.  Stable since PET/CT from 11/20/2018.  Confirming 2 years of stability.  No further surveillance needed.  No other sites of disease in the chest abdomen pelvis.  7/2022 - CBC CMP LDH unremarkable.  1/2023 - CBC CMP LDH unremarkable  7/2023 - increaed creatinine, rest of cbc cmp ldh unremarkable.  9/21/2023 - hb down to 11.9,wbc and plt normal.  11/30/23 - Hb 13.5, WBC 9.19, plt 371,  4/2024 - CBC normal, Hb 13.4, , ferritin 90, iron sat 9      2.   Iron deficiency anemia  diagnosed February 2018.   1/31/19 - Hemoglobin 11.9, MCV 87.9 and RDW 14.9.   2/21/19 - Ferritin 121 (N), iron 18 (L),  (N), iron saturation 6 (L). Prescribed Ferrous Sulfate 325 mg one tablet p.o. daily.   2/28/19 - Stool heme negative x3.   3/26/19 - Patient reports she is not taking ferrous sulfate daily as prescribed.  She chose to increase her intake of red meat.  Instructed to start taking ferrous sulfate 325 mg p.o. daily.  Explained rationale.   5/14/19 - 160 (). Iron 17 (). TIBC 283 (228-428). Iron saturation 6 (15-50).  Ferritin 160.  8/8/2019 hemoglobin 13.2, MCV 82. Ferritin 72 ().   5/21/2020: Ferritin 107.3, iron saturation 17 low, serum iron 57, TIBC 332, , creatinine 0.55, LFTs normal, WBC 8.9, hemoglobin 14.6, platelets 341, MCV 90.6  12/3/2020 WBC 10.1, Hgb 14.5, Plts 336  11/30/23 - ferritin 179, iron sat 16  4/2024 - ferritin 90, iron sat 9    3.   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   5/9/19 - Prescribed Coumadin 5 mg p.o. daily. Referred to Dr. Duckworth for evaluation of infrarenal aortic aneurysm and mural thrombus seen on CT abdomen and pelvis.   5/14/19 - INR 1.4. Increased Coumadin to 6 mg p.o. daily. Follow INR protocol. INR’s to be done at [facility]. Prescription given.  8/8/2019 patient claims to have been seen by Dr. Duckworth and advised to stop warfarin.      SUBJECTIVE:   10/24/2024: Patient seen today for follow-up visit.  She was previously being seen by Dr. Hernandez.  She has underlying history of DLBCL and has been treated with R-CHOP x 6 in 2019.  She also has history of iron deficiency anemia in the past.  Patient is clinically doing well.  Reported having recent onset dry cough.  She continues to smoke about 5-6 cigarettes a day.  Has good energy and good appetite/weight.  Denied any other B symptoms.  Her planned restaging scans and labs have been delayed due to insurance issues.  She is not scheduled  for restaging scans in 1 week.      Past Medical History:   Diagnosis Date    Hypertension 1998     Past Surgical History:   Procedure Laterality Date    HYSTERECTOMY      PORTACATH PLACEMENT           Current Outpatient Medications:     amLODIPine (NORVASC) 2.5 MG tablet, , Disp: , Rfl:     ascorbic acid (VITAMIN C) 250 MG tablet, Take 1 tablet by mouth Daily., Disp: , Rfl:     aspirin 81 MG EC tablet, Every other day, Disp: , Rfl:     B Complex Vitamins (VITAMIN B COMPLEX 100 IJ), Inject  as directed., Disp: , Rfl:     buPROPion SR (WELLBUTRIN SR) 150 MG 12 hr tablet, Take 1 tablet by mouth., Disp: , Rfl:     Calcium-Magnesium-Vitamin D (CALCIUM 500 PO), Take  by mouth Daily., Disp: , Rfl:     carvedilol (COREG) 3.125 MG tablet, Take 1 tablet by mouth., Disp: , Rfl:     Cholecalciferol (VITAMIN D) 2000 units capsule, Take  by mouth Daily., Disp: , Rfl:     Entresto 24-26 MG tablet, Take 1 tablet by mouth 2 (Two) Times a Day., Disp: , Rfl:     Potassium 99 MG tablet, Take  by mouth Daily., Disp: , Rfl:     rosuvastatin (CRESTOR) 10 MG tablet, , Disp: , Rfl:     SPIRONOLACTONE PO, , Disp: , Rfl:     vitamin B-12 (cyanocobalamin) 100 MCG tablet, Take 1 tablet by mouth Daily., Disp: , Rfl:     No Known Allergies    Family History   Problem Relation Age of Onset    Kidney cancer Father 66    Leukemia Brother 48    Multiple myeloma Brother 47     Cancer-related family history includes Kidney cancer (age of onset: 66) in her father.    Social History     Tobacco Use    Smoking status: Every Day     Current packs/day: 0.50     Average packs/day: 0.5 packs/day for 10.0 years (5.0 ttl pk-yrs)     Types: Cigarettes     Passive exposure: Current    Smokeless tobacco: Never    Tobacco comments:     started smoking when she was in her 20's   Vaping Use    Vaping status: Never Used   Substance Use Topics    Alcohol use: No    Drug use: No     REVIEW OF SYSTEMS:  12 point system negative except above.    OBJECTIVE:    Vitals:     "10/24/24 1356   BP: 142/83   Pulse: 99   Resp: 16   Temp: 98.2 °F (36.8 °C)   SpO2: 99%   Weight: 78.1 kg (172 lb 3.2 oz)   Height: 165.1 cm (65\")   PainSc: 0-No pain           ECOG  (0) Fully active, able to carry on all predisease performance without restriction    Physical Exam   HENT:   Head: Normocephalic. Mouth/Throat: Mucous membranes are moist.   Eyes: Pupils are equal, round, and reactive to light.   Cardiovascular: Normal rate.   Pulmonary/Chest: Effort normal and breath sounds normal.   Abdominal: Normal appearance.   Musculoskeletal: Normal range of motion.   Lymphadenopathy:     She has no cervical adenopathy.   Neurological: She is alert.   Skin: Skin is warm.        RECENT LABS  Lab Results - Last 18 Months   Lab Units 04/18/24  1410 11/30/23  1408 10/18/23  0809   WBC 10*3/mm3 9.90 9.19 11.37*   HEMOGLOBIN g/dL 13.4 13.5 13.7   HEMATOCRIT % 40.8 42.7 43.1   PLATELETS 10*3/mm3 339 371 341   MCV fL 91.7 86.6 84.0     Lab Results - Last 18 Months   Lab Units 04/18/24  1410 11/30/23  1408 09/28/23  0953 08/03/23  1326 07/31/23  1444   SODIUM mmol/L 138 134*  --  137 137   POTASSIUM mmol/L 4.3 4.6  --  3.9 4.1   CHLORIDE mmol/L 102 100  --  98 101   CO2 mmol/L 26.0 23.0  --  28.0 27.0   BUN mg/dL 10 15  --  11 10   CREATININE mg/dL 0.79 0.62 0.70 0.78 1.35*   CALCIUM mg/dL 9.4 9.4  --  9.7 9.1   BILIRUBIN mg/dL <0.2 0.2  --   --  0.4   ALK PHOS U/L 129* 111  --   --  110   ALT (SGPT) U/L 24 20  --   --  11   AST (SGOT) U/L 19 24  --   --  14   GLUCOSE mg/dL 106* 101*  --  70 80     Lab Results   Component Value Date    GLUCOSE 106 (H) 04/18/2024    BUN 10 04/18/2024    CREATININE 0.79 04/18/2024    EGFRIFNONA 83 12/09/2021    BCR 12.7 04/18/2024    K 4.3 04/18/2024    CO2 26.0 04/18/2024    CALCIUM 9.4 04/18/2024    ALBUMIN 4.0 04/18/2024    LABIL2 0.8 (L) 04/25/2019    AST 19 04/18/2024    ALT 24 04/18/2024     Lab Results   Component Value Date    IRON 29 (L) 04/18/2024    TIBC 340 04/18/2024    " "FERRITIN 90.46 04/18/2024     Lab Results   Component Value Date    FOLATE 17.20 10/26/2023     No results found for: \"OCCULTBLD\"  No results found for: \"RETICCTPCT\"  Lab Results   Component Value Date    FXWIUYUD51 580 11/30/2023     No results found for: \"SPEP\", \"UPEP\"  LDH   Date Value Ref Range Status   04/18/2024 151 135 - 214 U/L Final     Uric Acid   Date Value Ref Range Status   01/31/2019 2.4 (L) 2.6 - 8.0 mg/dL Final     Lab Results   Component Value Date    SEDRATE 61 (H) 08/08/2019     No results found for: \"FIBRINOGEN\", \"HAPTOGLOBIN\", \"DDIMER\"  No results found for: \"DDIMER\"  Lab Results   Component Value Date    PTT 29.7 10/14/2023    INR 1.2 10/14/2023     No results found for: \"\"  No results found for: \"CEA\"  No results found for: \"\"  No results found for: \"PSA\"  No results found for: \"SH0212\"    Assessment & Plan     There are no diagnoses linked to this encounter.    ASSESSMENT:    Diffuse large B-cell lymphoma of lymph nodes of neck (CMS/HCC): Status post 6 cycles of R-CHOP  finished 4/25/2019: Restaging CT scan 5/12/2020 does not show any evidence of lymphadenopathy in the neck chest.  Restaging CT scan on 11/23/2020 negative for recurrence.  Now another CT done in June 2021 with no evidence of disease recurrence. Repeat CT in 9/2023 with no concerns of recurrence.     -Previously planned restaging scans are due, scheduled for next week.  If reported normal, will plan for annual low-dose CT chest given smoking status.    -Will also check CBC, CMP, LDH in 1-2 weeks.    She is status post port removal.    Iron deficiency anemia due to chronic blood loss:  Intermittently taking oral iron.  Will check iron profile and CBC prior to follow-up.    Smoking: Smoking 5-6 cigarettes a day.  Counseled to quit smoking.    Aortic mural thrombus - was on coumadin initially this was stopped by vascular surgery.     Right breast nodule seen on previous CT scan.  No palpable abnormality.  Mammogram " normal on 8/3/2020.  Normal mention of the recent CT.  Continue screening mammogram. She has been non compliant. She states that she has had it in 9/2023 and she states that it was fine.   -She has missed her annual mammogram.  Advised to follow-up with PCP to schedule the same.      Cholelithiasis - no symptoms    8. Counseled patient on age-appropriate cancer screening and need for vaccination given her lymphoma survivor status..  She we will follow-up with her PCP on this.    4-week follow-up with restaging scans and labs, sooner as needed.

## 2024-10-24 ENCOUNTER — OFFICE VISIT (OUTPATIENT)
Dept: ONCOLOGY | Facility: CLINIC | Age: 69
End: 2024-10-24
Payer: MEDICARE

## 2024-10-24 VITALS
SYSTOLIC BLOOD PRESSURE: 142 MMHG | WEIGHT: 172.2 LBS | BODY MASS INDEX: 28.69 KG/M2 | HEART RATE: 99 BPM | TEMPERATURE: 98.2 F | DIASTOLIC BLOOD PRESSURE: 83 MMHG | RESPIRATION RATE: 16 BRPM | HEIGHT: 65 IN | OXYGEN SATURATION: 99 %

## 2024-10-24 DIAGNOSIS — D50.0 IRON DEFICIENCY ANEMIA DUE TO CHRONIC BLOOD LOSS: Primary | ICD-10-CM

## 2024-10-24 DIAGNOSIS — C83.31 DIFFUSE LARGE B-CELL LYMPHOMA OF LYMPH NODES OF NECK: ICD-10-CM

## 2024-10-24 RX ORDER — AZITHROMYCIN 250 MG/1
TABLET, FILM COATED ORAL
Qty: 6 TABLET | Refills: 0 | Status: SHIPPED | OUTPATIENT
Start: 2024-10-24

## 2024-10-28 ENCOUNTER — HOSPITAL ENCOUNTER (OUTPATIENT)
Dept: PET IMAGING | Facility: HOSPITAL | Age: 69
Discharge: HOME | End: 2024-10-28
Admitting: INTERNAL MEDICINE
Payer: MEDICARE

## 2024-10-28 DIAGNOSIS — C83.31 DIFFUSE LARGE B-CELL LYMPHOMA OF LYMPH NODES OF NECK: ICD-10-CM

## 2024-10-28 DIAGNOSIS — D50.0 IRON DEFICIENCY ANEMIA DUE TO CHRONIC BLOOD LOSS: ICD-10-CM

## 2024-10-28 PROCEDURE — 71260 CT THORAX DX C+: CPT

## 2024-10-28 PROCEDURE — 74177 CT ABD & PELVIS W/CONTRAST: CPT

## 2024-10-28 PROCEDURE — 25510000001 IOPAMIDOL PER 1 ML: Performed by: STUDENT IN AN ORGANIZED HEALTH CARE EDUCATION/TRAINING PROGRAM

## 2024-10-28 RX ORDER — IOPAMIDOL 755 MG/ML
100 INJECTION, SOLUTION INTRAVASCULAR
Status: COMPLETED | OUTPATIENT
Start: 2024-10-28 | End: 2024-10-28

## 2024-10-28 RX ADMIN — IOPAMIDOL 100 ML: 755 INJECTION, SOLUTION INTRAVENOUS at 11:02

## 2024-11-15 ENCOUNTER — CLINICAL SUPPORT (OUTPATIENT)
Dept: FAMILY MEDICINE CLINIC | Facility: CLINIC | Age: 69
End: 2024-11-15
Payer: MEDICARE

## 2024-11-15 DIAGNOSIS — D50.0 IRON DEFICIENCY ANEMIA DUE TO CHRONIC BLOOD LOSS: ICD-10-CM

## 2024-11-15 LAB
IRON 24H UR-MRATE: 54 MCG/DL (ref 37–145)
IRON SATN MFR SERPL: 15 % (ref 20–50)
TIBC SERPL-MCNC: 362 MCG/DL (ref 298–536)
TRANSFERRIN SERPL-MCNC: 243 MG/DL (ref 200–360)

## 2024-11-15 PROCEDURE — 82607 VITAMIN B-12: CPT | Performed by: STUDENT IN AN ORGANIZED HEALTH CARE EDUCATION/TRAINING PROGRAM

## 2024-11-15 PROCEDURE — 36415 COLL VENOUS BLD VENIPUNCTURE: CPT | Performed by: STUDENT IN AN ORGANIZED HEALTH CARE EDUCATION/TRAINING PROGRAM

## 2024-11-15 PROCEDURE — 84466 ASSAY OF TRANSFERRIN: CPT | Performed by: STUDENT IN AN ORGANIZED HEALTH CARE EDUCATION/TRAINING PROGRAM

## 2024-11-15 PROCEDURE — 82746 ASSAY OF FOLIC ACID SERUM: CPT | Performed by: STUDENT IN AN ORGANIZED HEALTH CARE EDUCATION/TRAINING PROGRAM

## 2024-11-15 PROCEDURE — 83540 ASSAY OF IRON: CPT | Performed by: STUDENT IN AN ORGANIZED HEALTH CARE EDUCATION/TRAINING PROGRAM

## 2024-11-16 LAB
FOLATE SERPL-MCNC: 17.3 NG/ML (ref 4.78–24.2)
VIT B12 BLD-MCNC: 562 PG/ML (ref 211–946)

## 2025-01-13 NOTE — PROGRESS NOTES
Hematology/Oncology Outpatient Follow Up    PATIENT NAME:Sarah Borja  :1955  MRN: [id number]  PRIMARY CARE PHYSICIAN: Miguel Lezama MD  REFERRING PHYSICIAN: Miguel Lezama, *    No chief complaint on file.       HISTORY OF PRESENT ILLNESS:   Right tonsillar diffuse large B-cell lymphoma diagnosed 2018.  This  female who claims to have developed a sore throat in 2018. She saw her primary care provider, Tran Vaz N.P. and was prescribed antibiotic. She did have palpable lymph nodes and a CT scan of the soft tissue neck was performed on 10/11/18 revealing asymmetric enlargement of the right palatine tonsil. There was right jugular digastric and posterior triangle adenopathy. The largest lymph node within the posterior triangle measured approximately 3.6 x 3.2 x 2.0 cm. She was referred to Sly Rosario M.D. and was seen by him in initial consultation on 18 where laryngoscopy revealed right tonsillar mass. FNA was performed on in on 10/31/18 with specimen sent for pathology, but routine staining was not performed. Flow cytometry revealed a CD10 positive monoclonal B-cell population which by light scatter analysis fell into both the small cell and large cell regions, though predominantly into the large cell region. FISH analysis for BCL2, BCL6 and MYC were negative. Though Dr. Rosairo had requested routine staining of the specimen, this was not performed and Pathology had wanted a more fresh specimen, prior to which she was referred to me for consultation. The patient today is denying any weight loss. She does complain of low-grade fevers controlled with Aspirin and claims to have had night sweats on and off for 10 years.   18 - [facility] cell non-Hodgkin's lymphoma of the right tonsil on referral by Sly Rosario M.D. WBC 10.9 with 69% neutrophils, 23% lymphocytes, 6% monocytes, hemoglobin  13.8, platelet count 408,000.  ().  Globulin 4.1 (2.5-3.8), uric acid 5.0 (2.6-8.0), ESR 61 (0-30).      11/16/18 - Bone marrow aspiration and biopsy with normocellular marrow with maturing trilineage hematopoiesis. Immunohistochemistry had no increase in CD34 positive blasts or CD20 positive B-lymphocytes. Flow cytometry had no evidence of an abnormal cell type. Cytogenetics revealed 48 XX normal female karyotype. Echocardiogram with mild mitral and mild tricuspid regurgitation, moderate concentric left ventricular hypertrophy and LV systolic function normal with EF about 65-70%.   11/17/18 - Echocardiogram with mild mitral and tricuspid regurgitation, moderate concentric left ventricular hypertrophy, LV systolic function normal with EF about 65-70%.   11/19/18 - Chest x-ray with clear lungs.   11/20/18 - PET scan with extensive right neck lymphadenopathy beginning at the right tonsillar pillar and continuing to the supraclavicular region with SUV between 26 and 16 with extremely hypermetabolic lymph nodes, changes of cholelithiasis and old healed granulomatous disease. Mass of the nodes at L2 are approximately 6.5 x 4.2 cm at the level 3 adenopathy is also seen over an area of 5.5 x 2.8 submandibular gland and sternocleidomastoid muscle are poorly-delineated in the adenopathy. Supraclavicular lymph node is seen measuring over 24 mm. No hypermetabolic activity in the abdomen. Mild infrarenal abdominal aortic aneurysm measuring 28 mm.   11/29/18 - Patient underwent Infusaport placement by Ashish Mcginnis M.D.   11/30/18 - WBC 8.5, hemoglobin 12.7, platelet count 358,000. Discussed workup results. Patient started on Allopurinol 300 mg p.o. daily with repeat tonsillar biopsy planned for histology. SPEP with hypoalbuminemia. Hepatitis B core antibody surface antigen, hepatis C antibody all non-reactive.   12/19/18 - Patient underwent ultrasound-guided needle biopsy of right neck mass by Ashish Funk M.D.  with pathology revealing diffuse large B-cell lymphoma germinal center type. Flow cytometry revealed CD10 positive B-cell lymphoma. The lymphoma was CD20 and surface kappa and lambda chain positive.   12/27/18 - Chemotherapy orders written for R-CHOP. Rituximab 375 mg/M2 IV day 1, Cyclophosphamide 750 mg/M2 IV day 1, Doxorubicin 50 mg/M2 IV day 1, Vincristine 1.4 mg/M2 IV day 1 with a max of 2 mg dose and Prednisone 100 mg p.o. days 3-8 to cycle every 21 days for about six cycles in a curative intent. Neulasta Onpro also ordered.    1/3/19 - Discussed results of workup and chemotherapy.   1/10/19 - Cycle 1 chemotherapy given.   1/17/19 - WBC 2.1 with 28% neutrophils, 49% lymphocytes, 8% monocytes, hemoglobin 12.9, platelet count 234,000.   1/22/19 - Patient tolerating chemotherapy well. Sed rate 49 (0-30).  ESR 49 (H).   1/31/19 - Cycle 2 R-CHOP initiated with Neulasta support. Sed rate 81 (0-30).  ESR 81 (H). Uric acid 2.4 (L). Sodium 133 (L).   2/7/19 - Labs at [facility] with verbal report of WBC 1.5 and ANC 0.41. Cipro 500 mg p.o. b.i.d. x7 days as prophylaxis prescribed.   2/12/19 - Patient tolerating chemotherapy overall well to date with some expected taste changes and constipation. Patient advised she may use MiraLAX p.r.n. and patient advised to play with diet as well use of sour candies or mints to stimulate the taste buds. A brief period of neutropenia without febrile neutropenia noted during the last cycle with Neulasta given. Allopurinol discontinued.   2/21/19 - Ferritin 121 (N), iron 18 (L),  (N), iron saturation 6% (L). Prescribed Ferrous Sulfate 325 mg daily. Received cycle 3 day 1 R-CHOP. Port will not draw. Activase utilized and started on Coumadin 1 mg p.o. daily.   2/28/19 - Stool heme negative x3. CT neck and chest with contrast showed previously-identified mass centered with right palatine tonsils appears to have resolved. Previously-identified right cervical  lymphadenopathy has resolved. No evidence of lymphadenopathy within the chest. 4 mm left upper lobe nodule is stable from prior PET CT.  This is indeterminate. Recommend attention to follow up. Echocardiogram showed aortic valve sclerosis, EF 65-70%. No pericardial effusion.   3/6/19 - Discussed with the patient the option of either continuing with chemotherapy for a total of six cycles versus stopping chemotherapy and proceeding with radiation to her site of disease. Patient would like to complete planned chemotherapy course.   3/6/19 - PT 11.8, INR 1.0.  Patient continued on Coumadin 1 mg p.o. daily for port malfunction.  ESR 64, high.    3/14/19 - Cycle 4 chemotherapy given.   4/4/19 - Cycle 5 chemotherapy given.   4/16/19 - Patient complains of upper respiratory symptoms which are improving on Cipro.   4/25/19 - Cycle 6 chemotherapy given. Urinalysis showed trace heme.   5/5/19 - . Prescribed Ciprofloxacin 500 mg p.o. b.i.d. x7 days.   5/8/19 - Echocardiogram 60-65% ejection fraction with mild mitral and tricuspid regurgitation. CT soft tissue neck with no acute process or adenopathy seen. CT chest, abdomen and pelvis showed no acute cardiopulmonary process, no suspicious lymphadenopathy. Previously described 4 mm nodule in the left upper lobe is likely an intrafissural lymph node. No acute intraabdominal or intrapelvic process. No suspicious lymphadenopathy. Atherosclerosis with infrarenal abdominal aortic ectasia/aneurysm with mural thrombus formation. Coumadin increased to 5 mg p.o. Q.PM.  8/8/2019 Coumadin dose decreased to 1 mg p.o. daily for [id number] maintenance. Sed rate 61 (0-30).  ().  11/1/19 - CT soft tissue neck showed no evidence of recurrent or metastatic disease in the neck in this patient with a history of lymphoma. No acute findings. CT chest abdomen and pelvis showed No convincing evidence of recurrent or metastatic disease in the chest, abdomen or pelvis. Stable 3.2 cm  fusiform infrarenal abdominal aortic aneurysm with 2% luminal stenosis secondary to atherosclerotic plaquing, and irregular ulcerated plaquing. Stable 4 mm noncalcified left upper lobe pulmonary nodule since 11/20/2018. Benign etiology is favored. This may represent a noncalcified granuloma, given the extensive granulomatous changes elsewhere within the chest.  Uncomplicated cholelithiasis.  5/12/2020: CT soft tissue neck with contrast, CT chest with contrast: No evidence of lymphadenopathy within the neck or chest.  Stable small nodule in the right breast likely benign.  Recommend correlation with mammography.  8/3/2020: Bilateral 3D mammogram: BI-RADS 2 benign.  No evidence of malignancy.  Will circumscribed masses within both breasts, more numerous in the left breast, appear relatively stable from prior mammograms from 2014.   9/1/2020   11/23/2020 No convincing evidence of recurrent disease or metastatic disease in the chest, abdomen, or pelvis. A 4 mm noncalcified left upper lobe nodule is stable since the most remote available PET/CT from 11/20/2018. This confirms over 2 years of stability and benignity.  Stable 3.2 cm fusiform infrarenal abdominal aortic aneurysm with irregular and eccentric mural thrombus.  Uncomplicated cholelithiasis.   6/3/2021: WBC 10.02, hemoglobin 14.2, platelets 329  6/17/2021: CT chest abdomen pelvis with stable 4 mm noncalcified left upper lobe lung nodule unchanged from the prior examination.  Stable since PET/CT from 11/20/2018.  Confirming 2 years of stability.  No further surveillance needed.  No other sites of disease in the chest abdomen pelvis.  7/2022 - CBC CMP LDH unremarkable.  1/2023 - CBC CMP LDH unremarkable  7/2023 - increaed creatinine, rest of cbc cmp ldh unremarkable.  9/21/2023 - hb down to 11.9,wbc and plt normal.  11/30/23 - Hb 13.5, WBC 9.19, plt 371,  4/2024 - CBC normal, Hb 13.4, , ferritin 90, iron sat 9      2.   Iron deficiency anemia  diagnosed February 2018.   1/31/19 - Hemoglobin 11.9, MCV 87.9 and RDW 14.9.   2/21/19 - Ferritin 121 (N), iron 18 (L),  (N), iron saturation 6 (L). Prescribed Ferrous Sulfate 325 mg one tablet p.o. daily.   2/28/19 - Stool heme negative x3.   3/26/19 - Patient reports she is not taking ferrous sulfate daily as prescribed.  She chose to increase her intake of red meat.  Instructed to start taking ferrous sulfate 325 mg p.o. daily.  Explained rationale.   5/14/19 - 160 (). Iron 17 (). TIBC 283 (228-428). Iron saturation 6 (15-50).  Ferritin 160.  8/8/2019 hemoglobin 13.2, MCV 82. Ferritin 72 ().   5/21/2020: Ferritin 107.3, iron saturation 17 low, serum iron 57, TIBC 332, , creatinine 0.55, LFTs normal, WBC 8.9, hemoglobin 14.6, platelets 341, MCV 90.6  12/3/2020 WBC 10.1, Hgb 14.5, Plts 336  11/30/23 - ferritin 179, iron sat 16  4/2024 - ferritin 90, iron sat 9    3.   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   5/9/19 - Prescribed Coumadin 5 mg p.o. daily. Referred to Dr. Duckworth for evaluation of infrarenal aortic aneurysm and mural thrombus seen on CT abdomen and pelvis.   5/14/19 - INR 1.4. Increased Coumadin to 6 mg p.o. daily. Follow INR protocol. INR’s to be done at [facility]. Prescription given.  8/8/2019 patient claims to have been seen by Dr. Duckworth and advised to stop warfarin.        10/24/2024: Patient seen today for follow-up visit.  She was previously being seen by Dr. Hernandez.  She has underlying history of DLBCL and has been treated with R-CHOP x 6 in 2019.  She also has history of iron deficiency anemia in the past.  Patient is clinically doing well.  Reported having recent onset dry cough.  She continues to smoke about 5-6 cigarettes a day.  Has good energy and good appetite/weight.  Denied any other B symptoms.  Her planned restaging scans and labs have been delayed due to insurance issues.  She is not scheduled for restaging  scans in 1 week.    10/28/24: CT chest Abdomen Pelvis W contrast  IMPRESSION:  1. No evidence of recurrent malignancy or metastatic disease within the chest, abdomen or pelvis  2. 4.0 cm fusiform infrarenal abdominal aortic aneurysm, increased from 3.7 cm on prior examination.     SUBJECTIVE:   1/16/25: Pt seen for follow up. Clinically doing well since last visit. Reported that she has cut down her smoking to 3 cigarettes/day. Still has not scheduled her mammogram. Also is overdue for Colonoscopy.    Past Medical History:   Diagnosis Date    Hypertension 1998     Past Surgical History:   Procedure Laterality Date    HYSTERECTOMY      PORTACATH PLACEMENT      VENOUS ACCESS DEVICE (PORT) REMOVAL           Current Outpatient Medications:     amLODIPine (NORVASC) 2.5 MG tablet, , Disp: , Rfl:     ascorbic acid (VITAMIN C) 250 MG tablet, Take 1 tablet by mouth Daily., Disp: , Rfl:     aspirin 81 MG EC tablet, Every other day, Disp: , Rfl:     azithromycin (Zithromax Z-Darius) 250 MG tablet, Take 2 tablets the first day, then 1 tablet daily for 4 days., Disp: 6 tablet, Rfl: 0    B Complex Vitamins (VITAMIN B COMPLEX 100 IJ), Inject  as directed., Disp: , Rfl:     buPROPion SR (WELLBUTRIN SR) 150 MG 12 hr tablet, Take 1 tablet by mouth., Disp: , Rfl:     Calcium-Magnesium-Vitamin D (CALCIUM 500 PO), Take  by mouth Daily., Disp: , Rfl:     carvedilol (COREG) 3.125 MG tablet, Take 1 tablet by mouth., Disp: , Rfl:     Cholecalciferol (VITAMIN D) 2000 units capsule, Take  by mouth Daily., Disp: , Rfl:     Potassium 99 MG tablet, Take  by mouth Daily., Disp: , Rfl:     rosuvastatin (CRESTOR) 10 MG tablet, , Disp: , Rfl:     SPIRONOLACTONE PO, , Disp: , Rfl:     vitamin B-12 (cyanocobalamin) 100 MCG tablet, Take 1 tablet by mouth Daily., Disp: , Rfl:     No Known Allergies    Family History   Problem Relation Age of Onset    Kidney cancer Father 66    Leukemia Brother 48    Multiple myeloma Brother 47     Cancer-related family  history includes Kidney cancer (age of onset: 66) in her father.    Social History     Tobacco Use    Smoking status: Every Day     Current packs/day: 0.50     Average packs/day: 0.5 packs/day for 38.0 years (19.0 ttl pk-yrs)     Types: Cigarettes     Start date: 1987     Passive exposure: Current    Smokeless tobacco: Never    Tobacco comments:     started smoking when she was in her 20's   Vaping Use    Vaping status: Never Used   Substance Use Topics    Alcohol use: No    Drug use: No     REVIEW OF SYSTEMS:  12 point system negative except above.    OBJECTIVE:    There were no vitals filed for this visit.          ECOG  (0) Fully active, able to carry on all predisease performance without restriction    Physical Exam   HENT:   Head: Normocephalic. Mouth/Throat: Mucous membranes are moist.   Eyes: Pupils are equal, round, and reactive to light.   Cardiovascular: Normal rate.   Pulmonary/Chest: Effort normal and breath sounds normal.   Abdominal: Normal appearance.   Musculoskeletal: Normal range of motion.   Lymphadenopathy:     She has no cervical adenopathy.   Neurological: She is alert.   Skin: Skin is warm.        RECENT LABS  Lab Results - Last 18 Months   Lab Units 04/18/24  1410 11/30/23  1408 10/18/23  0809   WBC 10*3/mm3 9.90 9.19 11.37*   HEMOGLOBIN g/dL 13.4 13.5 13.7   HEMATOCRIT % 40.8 42.7 43.1   PLATELETS 10*3/mm3 339 371 341   MCV fL 91.7 86.6 84.0     Lab Results - Last 18 Months   Lab Units 04/18/24  1410 11/30/23  1408 09/28/23  0953 08/03/23  1326 07/31/23  1444   SODIUM mmol/L 138 134*  --  137 137   POTASSIUM mmol/L 4.3 4.6  --  3.9 4.1   CHLORIDE mmol/L 102 100  --  98 101   CO2 mmol/L 26.0 23.0  --  28.0 27.0   BUN mg/dL 10 15  --  11 10   CREATININE mg/dL 0.79 0.62 0.70 0.78 1.35*   CALCIUM mg/dL 9.4 9.4  --  9.7 9.1   BILIRUBIN mg/dL <0.2 0.2  --   --  0.4   ALK PHOS U/L 129* 111  --   --  110   ALT (SGPT) U/L 24 20  --   --  11   AST (SGOT) U/L 19 24  --   --  14   GLUCOSE mg/dL 106*  "101*  --  70 80     Lab Results   Component Value Date    GLUCOSE 106 (H) 04/18/2024    BUN 10 04/18/2024    CREATININE 0.79 04/18/2024    EGFRIFNONA 83 12/09/2021    BCR 12.7 04/18/2024    K 4.3 04/18/2024    CO2 26.0 04/18/2024    CALCIUM 9.4 04/18/2024    ALBUMIN 4.0 04/18/2024    LABIL2 0.8 (L) 04/25/2019    AST 19 04/18/2024    ALT 24 04/18/2024     Lab Results   Component Value Date    IRON 54 11/15/2024    TIBC 362 11/15/2024    FERRITIN 90.46 04/18/2024     Lab Results   Component Value Date    FOLATE 17.30 11/15/2024     No results found for: \"OCCULTBLD\"  No results found for: \"RETICCTPCT\"  Lab Results   Component Value Date    RZYNUAVK69 562 11/15/2024     No results found for: \"SPEP\", \"UPEP\"  LDH   Date Value Ref Range Status   04/18/2024 151 135 - 214 U/L Final     Uric Acid   Date Value Ref Range Status   01/31/2019 2.4 (L) 2.6 - 8.0 mg/dL Final     Lab Results   Component Value Date    SEDRATE 61 (H) 08/08/2019     No results found for: \"FIBRINOGEN\", \"HAPTOGLOBIN\", \"DDIMER\"  No results found for: \"DDIMER\"  Lab Results   Component Value Date    PTT 29.7 10/14/2023    INR 1.2 10/14/2023     No results found for: \"\"  No results found for: \"CEA\"  No results found for: \"\"  No results found for: \"PSA\"  No results found for: \"FV7722\"    Assessment & Plan     There are no diagnoses linked to this encounter.    ASSESSMENT:    Diffuse large B-cell lymphoma of lymph nodes of neck (CMS/HCC): Status post 6 cycles of R-CHOP  finished 4/25/2019: Restaging CT scan 5/12/2020 does not show any evidence of lymphadenopathy in the neck chest.  Restaging CT scan on 11/23/2020 negative for recurrence.  Now another CT done in June 2021 with no evidence of disease recurrence. Repeat CT in 9/2023 with no concerns of recurrence.         -Repeat CT CAP W contrast in 10/2024 is reported normal. Given >5 years having passed since treatment, will discontinue routine scans.  - will plan for annual low-dose CT chest given " smoking status.    -6 month follow up with labs.    She is status post port removal.    Iron deficiency anemia due to chronic blood loss:  Intermittently taking oral iron. Iron levels are low, advised patient to start taking oral iron.    Smoking: Smoking 3-4 cigarettes a day.  Counseled to quit smoking.    Aortic mural thrombus - was on coumadin initially this was stopped by vascular surgery.     Right breast nodule seen on previous CT scan.  No palpable abnormality.  Mammogram normal on 8/3/2020.  Normal mention of the recent CT.  Continue screening mammogram. She has been non compliant. She states that she has had it in 9/2023 and she states that it was fine.   -She has missed her annual mammogram.  Advised to follow-up with PCP to schedule the same.      Cholelithiasis - no symptoms    8. Counseled patient on age-appropriate cancer screening and need for vaccination given her lymphoma survivor status..  She we will follow-up with her PCP on this.    6 month follow up with labs, sooner as needed.

## 2025-01-16 ENCOUNTER — OFFICE VISIT (OUTPATIENT)
Dept: ONCOLOGY | Facility: CLINIC | Age: 70
End: 2025-01-16
Payer: MEDICARE

## 2025-01-16 VITALS
TEMPERATURE: 98 F | OXYGEN SATURATION: 99 % | HEART RATE: 86 BPM | DIASTOLIC BLOOD PRESSURE: 84 MMHG | RESPIRATION RATE: 14 BRPM | WEIGHT: 172 LBS | BODY MASS INDEX: 28.66 KG/M2 | HEIGHT: 65 IN | SYSTOLIC BLOOD PRESSURE: 148 MMHG

## 2025-01-16 DIAGNOSIS — C83.31 DIFFUSE LARGE B-CELL LYMPHOMA OF LYMPH NODES OF NECK: ICD-10-CM

## 2025-01-16 DIAGNOSIS — D50.0 IRON DEFICIENCY ANEMIA DUE TO CHRONIC BLOOD LOSS: Primary | ICD-10-CM

## 2025-07-10 ENCOUNTER — CLINICAL SUPPORT (OUTPATIENT)
Dept: FAMILY MEDICINE CLINIC | Facility: CLINIC | Age: 70
End: 2025-07-10
Payer: MEDICARE

## 2025-07-10 DIAGNOSIS — C83.31 DIFFUSE LARGE B-CELL LYMPHOMA OF LYMPH NODES OF NECK: ICD-10-CM

## 2025-07-10 DIAGNOSIS — D50.0 IRON DEFICIENCY ANEMIA DUE TO CHRONIC BLOOD LOSS: ICD-10-CM

## 2025-07-10 LAB
BASOPHILS # BLD AUTO: 0.07 10*3/MM3 (ref 0–0.2)
BASOPHILS NFR BLD AUTO: 0.6 % (ref 0–1.5)
DEPRECATED RDW RBC AUTO: 45.1 FL (ref 37–54)
EOSINOPHIL # BLD AUTO: 0.41 10*3/MM3 (ref 0–0.4)
EOSINOPHIL NFR BLD AUTO: 3.7 % (ref 0.3–6.2)
ERYTHROCYTE [DISTWIDTH] IN BLOOD BY AUTOMATED COUNT: 13.2 % (ref 12.3–15.4)
FERRITIN SERPL-MCNC: 180 NG/ML (ref 13–150)
HCT VFR BLD AUTO: 43 % (ref 34–46.6)
HGB BLD-MCNC: 13.1 G/DL (ref 12–15.9)
IMM GRANULOCYTES # BLD AUTO: 0.14 10*3/MM3 (ref 0–0.05)
IMM GRANULOCYTES NFR BLD AUTO: 1.3 % (ref 0–0.5)
IRON 24H UR-MRATE: 74 MCG/DL (ref 37–145)
IRON SATN MFR SERPL: 21 % (ref 20–50)
LDH SERPL-CCNC: 151 U/L (ref 135–214)
LYMPHOCYTES # BLD AUTO: 2.8 10*3/MM3 (ref 0.7–3.1)
LYMPHOCYTES NFR BLD AUTO: 25.6 % (ref 19.6–45.3)
MCH RBC QN AUTO: 28.5 PG (ref 26.6–33)
MCHC RBC AUTO-ENTMCNC: 30.5 G/DL (ref 31.5–35.7)
MCV RBC AUTO: 93.7 FL (ref 79–97)
MONOCYTES # BLD AUTO: 0.8 10*3/MM3 (ref 0.1–0.9)
MONOCYTES NFR BLD AUTO: 7.3 % (ref 5–12)
NEUTROPHILS NFR BLD AUTO: 6.73 10*3/MM3 (ref 1.7–7)
NEUTROPHILS NFR BLD AUTO: 61.5 % (ref 42.7–76)
NRBC BLD AUTO-RTO: 0 /100 WBC (ref 0–0.2)
PLATELET # BLD AUTO: 334 10*3/MM3 (ref 140–450)
PMV BLD AUTO: 10.5 FL (ref 6–12)
RBC # BLD AUTO: 4.59 10*6/MM3 (ref 3.77–5.28)
RETICS # AUTO: 0.07 10*6/MM3 (ref 0.02–0.13)
RETICS/RBC NFR AUTO: 1.55 % (ref 0.7–1.9)
TIBC SERPL-MCNC: 349 MCG/DL (ref 298–536)
TRANSFERRIN SERPL-MCNC: 234 MG/DL (ref 200–360)
WBC NRBC COR # BLD AUTO: 10.95 10*3/MM3 (ref 3.4–10.8)

## 2025-07-10 PROCEDURE — 82728 ASSAY OF FERRITIN: CPT | Performed by: STUDENT IN AN ORGANIZED HEALTH CARE EDUCATION/TRAINING PROGRAM

## 2025-07-10 PROCEDURE — 83615 LACTATE (LD) (LDH) ENZYME: CPT | Performed by: STUDENT IN AN ORGANIZED HEALTH CARE EDUCATION/TRAINING PROGRAM

## 2025-07-10 PROCEDURE — 83540 ASSAY OF IRON: CPT | Performed by: STUDENT IN AN ORGANIZED HEALTH CARE EDUCATION/TRAINING PROGRAM

## 2025-07-10 PROCEDURE — 85025 COMPLETE CBC W/AUTO DIFF WBC: CPT | Performed by: STUDENT IN AN ORGANIZED HEALTH CARE EDUCATION/TRAINING PROGRAM

## 2025-07-10 PROCEDURE — 85045 AUTOMATED RETICULOCYTE COUNT: CPT | Performed by: STUDENT IN AN ORGANIZED HEALTH CARE EDUCATION/TRAINING PROGRAM

## 2025-07-10 PROCEDURE — 84466 ASSAY OF TRANSFERRIN: CPT | Performed by: STUDENT IN AN ORGANIZED HEALTH CARE EDUCATION/TRAINING PROGRAM

## 2025-07-10 NOTE — PROGRESS NOTES
Venipuncture Blood Specimen Collection  Venipuncture performed in RT ARM by Kym Luo with good hemostasis. Patient tolerated the procedure well without complications.   07/10/25   Kym Luo

## 2025-07-31 ENCOUNTER — TELEPHONE (OUTPATIENT)
Dept: ONCOLOGY | Facility: CLINIC | Age: 70
End: 2025-07-31
Payer: MEDICARE

## 2025-07-31 NOTE — TELEPHONE ENCOUNTER
NADIAM on pt phone requesting call back if pt would like to r/s her appt w/ Dr Gonzalez that was missed.